# Patient Record
Sex: MALE | Race: BLACK OR AFRICAN AMERICAN | Employment: OTHER | URBAN - METROPOLITAN AREA
[De-identification: names, ages, dates, MRNs, and addresses within clinical notes are randomized per-mention and may not be internally consistent; named-entity substitution may affect disease eponyms.]

---

## 2017-03-31 DIAGNOSIS — E08.319: ICD-10-CM

## 2017-03-31 DIAGNOSIS — E78.5 DYSLIPIDEMIA: ICD-10-CM

## 2017-04-02 RX ORDER — ATORVASTATIN CALCIUM 40 MG/1
TABLET, FILM COATED ORAL
Qty: 90 TAB | Refills: 0 | Status: SHIPPED | OUTPATIENT
Start: 2017-04-02 | End: 2017-11-18 | Stop reason: SDUPTHER

## 2017-09-21 DIAGNOSIS — K21.9 GERD WITHOUT ESOPHAGITIS: ICD-10-CM

## 2017-09-21 RX ORDER — PANTOPRAZOLE SODIUM 40 MG/1
TABLET, DELAYED RELEASE ORAL
Qty: 180 TAB | Refills: 0 | Status: SHIPPED | OUTPATIENT
Start: 2017-09-21 | End: 2018-03-02 | Stop reason: SDUPTHER

## 2017-11-08 ENCOUNTER — TELEPHONE (OUTPATIENT)
Dept: INTERNAL MEDICINE CLINIC | Age: 62
End: 2017-11-08

## 2017-11-08 NOTE — TELEPHONE ENCOUNTER
Call pt  They usually will not cover colos except every 5 years  Have him call transplant center and confirm date with them (feb 2015)  If they insist, sched dr Ana Amaral and have him decide if he can do it

## 2017-11-08 NOTE — TELEPHONE ENCOUNTER
Pt is on the list for a kidney transplant and needs to have a colonoscopy done asap so they don't take him off the list    Please advise pt

## 2017-11-08 NOTE — TELEPHONE ENCOUNTER
I called and spoke with patient, he stated that he actually had one done in 2016 as well. The transplant center is adamant that he have another so he is going to call Dr. Marvin Santana office to schedule an appt and see if Ela Cortney will do another one.

## 2017-11-18 DIAGNOSIS — E78.5 DYSLIPIDEMIA: ICD-10-CM

## 2017-11-18 DIAGNOSIS — E08.319: ICD-10-CM

## 2017-11-19 RX ORDER — ATORVASTATIN CALCIUM 40 MG/1
TABLET, FILM COATED ORAL
Qty: 90 TAB | Refills: 0 | Status: SHIPPED | OUTPATIENT
Start: 2017-11-19 | End: 2018-03-02 | Stop reason: SDUPTHER

## 2018-02-06 ENCOUNTER — TELEPHONE (OUTPATIENT)
Dept: INTERNAL MEDICINE CLINIC | Age: 63
End: 2018-02-06

## 2018-02-06 DIAGNOSIS — Z12.11 SCREENING FOR COLON CANCER: Primary | ICD-10-CM

## 2018-02-06 DIAGNOSIS — D12.6 COLON ADENOMA: ICD-10-CM

## 2018-02-06 NOTE — TELEPHONE ENCOUNTER
Pt on kidney transplant list- they are telling him he needs a colonoscopy- he said you told him it was too soon.  Please advise

## 2018-02-08 NOTE — TELEPHONE ENCOUNTER
Spoke with patient and given message below. He said he is going to call the kidney transplant people back and then he will call Dr. Bruce Osborne if they are still insistent that he have one even though it's too soon.

## 2018-02-14 ENCOUNTER — TELEPHONE (OUTPATIENT)
Dept: INTERNAL MEDICINE CLINIC | Age: 63
End: 2018-02-14

## 2018-03-05 DIAGNOSIS — E78.5 DYSLIPIDEMIA: ICD-10-CM

## 2018-03-05 DIAGNOSIS — E08.319: ICD-10-CM

## 2018-03-05 DIAGNOSIS — K21.9 GERD WITHOUT ESOPHAGITIS: ICD-10-CM

## 2018-03-05 RX ORDER — ATORVASTATIN CALCIUM 40 MG/1
TABLET, FILM COATED ORAL
Qty: 90 TAB | Refills: 0 | OUTPATIENT
Start: 2018-03-05

## 2018-03-05 RX ORDER — PANTOPRAZOLE SODIUM 40 MG/1
TABLET, DELAYED RELEASE ORAL
Qty: 180 TAB | Refills: 0 | OUTPATIENT
Start: 2018-03-05

## 2018-03-05 NOTE — TELEPHONE ENCOUNTER
I refilled meds for 30 days then [facility] sent 90 day request for the protonix and lipitor. I only authorized 30 days as pt is overdue for appt with RD (see previous refill encounter if needed).

## 2018-03-07 ENCOUNTER — TELEPHONE (OUTPATIENT)
Dept: INTERNAL MEDICINE CLINIC | Age: 63
End: 2018-03-07

## 2018-03-07 DIAGNOSIS — E11.40 TYPE 2 DIABETES, CONTROLLED, WITH NEUROPATHY (HCC): Primary | ICD-10-CM

## 2018-03-07 DIAGNOSIS — E78.5 DYSLIPIDEMIA: ICD-10-CM

## 2018-03-15 ENCOUNTER — LAB ONLY (OUTPATIENT)
Dept: INTERNAL MEDICINE CLINIC | Age: 63
End: 2018-03-15

## 2018-03-15 ENCOUNTER — HOSPITAL ENCOUNTER (OUTPATIENT)
Dept: LAB | Age: 63
Discharge: HOME | End: 2018-03-15
Payer: MEDICARE

## 2018-03-15 DIAGNOSIS — E11.40 TYPE 2 DIABETES, CONTROLLED, WITH NEUROPATHY (HCC): ICD-10-CM

## 2018-03-15 DIAGNOSIS — E11.40 TYPE 2 DIABETES, CONTROLLED, WITH NEUROPATHY (HCC): Primary | ICD-10-CM

## 2018-03-15 DIAGNOSIS — E78.5 DYSLIPIDEMIA: ICD-10-CM

## 2018-03-15 LAB
ALBUMIN SERPL-MCNC: 3.3 G/DL (ref 3.4–5)
ALBUMIN/GLOB SERPL: 0.9 {RATIO} (ref 0.8–1.7)
ALP SERPL-CCNC: 77 U/L (ref 45–117)
ALT SERPL-CCNC: 13 U/L (ref 16–61)
ANION GAP SERPL CALC-SCNC: 8 MMOL/L (ref 3–18)
AST SERPL-CCNC: 7 U/L (ref 15–37)
BILIRUB SERPL-MCNC: 0.3 MG/DL (ref 0.2–1)
BUN SERPL-MCNC: 31 MG/DL (ref 7–18)
BUN/CREAT SERPL: 5 (ref 12–20)
CALCIUM SERPL-MCNC: 8.8 MG/DL (ref 8.5–10.1)
CHLORIDE SERPL-SCNC: 97 MMOL/L (ref 100–108)
CHOLEST SERPL-MCNC: 150 MG/DL
CO2 SERPL-SCNC: 32 MMOL/L (ref 21–32)
CREAT SERPL-MCNC: 6.83 MG/DL (ref 0.6–1.3)
GLOBULIN SER CALC-MCNC: 3.7 G/DL (ref 2–4)
GLUCOSE SERPL-MCNC: 103 MG/DL (ref 74–99)
HDLC SERPL-MCNC: 35 MG/DL (ref 40–60)
HDLC SERPL: 4.3 {RATIO} (ref 0–5)
LDLC SERPL CALC-MCNC: 93.4 MG/DL (ref 0–100)
LIPID PROFILE,FLP: ABNORMAL
POTASSIUM SERPL-SCNC: 4.5 MMOL/L (ref 3.5–5.5)
PROT SERPL-MCNC: 7 G/DL (ref 6.4–8.2)
SODIUM SERPL-SCNC: 137 MMOL/L (ref 136–145)
TRIGL SERPL-MCNC: 108 MG/DL (ref ?–150)
VLDLC SERPL CALC-MCNC: 21.6 MG/DL

## 2018-03-15 PROCEDURE — 83036 HEMOGLOBIN GLYCOSYLATED A1C: CPT | Performed by: INTERNAL MEDICINE

## 2018-03-15 PROCEDURE — 36415 COLL VENOUS BLD VENIPUNCTURE: CPT | Performed by: INTERNAL MEDICINE

## 2018-03-15 PROCEDURE — 80061 LIPID PANEL: CPT | Performed by: INTERNAL MEDICINE

## 2018-03-15 PROCEDURE — 80053 COMPREHEN METABOLIC PANEL: CPT | Performed by: INTERNAL MEDICINE

## 2018-03-16 LAB — HBA1C MFR BLD: 4.6 % (ref 4.2–5.6)

## 2018-03-22 NOTE — PROGRESS NOTES
57 y.o. BLACK OR  male who presents for f/u    We have not seen him in over a year but he seems to be doing well. No cardiovascular complaints. He does the indoor bike daily about 30 min    He apparently fell and sustained a left wrist and left upper arm fx in January, treated conservatively by Dr Carolanne Severs at [address] [facility] [facility]. He's recovered most of his function    Denies any GI or  complaints. He has colo scheduled in early April w Dr Opal Conway    He continues on his qMWF hemodialysis with Dr. Debra Rubio. He is now on the transplant list    He was apparently released by Dr Zachary Lea. He sees Dr Beatriz Boothe. Denies polyuria, polydipsia, nocturia, vision change.  Weight is stable    Vitals 3/23/2018 12/20/2016 11/3/2016 10/28/2016 10/28/2016   Weight 167 lb 180 lb 179 lb  178 lb     LAST MEDICARE WELLNESS EXAM: 9/18/14, 9/24/15, 12/20/16, 3/23/18 ACP 12/20/16, 3/23/18    Past Medical History:   Diagnosis Date    Acute hearing loss of right ear     Acute urinary retention 8/15    Anemia     Anemia of chronic disease 2010    saw Dr. Martha Rivera in past    Asbestosis(501)     Chronic edema     Colon adenoma     Dr. Opal Conway 2009, 2/15    Diabetes mellitus ([facility] [address]     w neuropathy and retinopathy Dr. Darren Bridges DJDARCI (degenerative joint disease)     Dyslipidemia     Erectile dysfunction     ESRD on hemodialysis ([facility] [address]     Dr. Debra Rubio, M-W-F    FHx: heart disease     GERD (gastroesophageal reflux disease)     Glaucoma     Dr. Aneta Jara failure [facility]) 2011    HTN (hypertension)     Kidney stone     Sarcoidosis     Sleep apnea     he does not use his cpap machine (states resolved)    Thyroid disease      Past Surgical History:   Procedure Laterality Date    Michela Wise HX COLONOSCOPY      Dr. Opal Conway adenoma 2009, 2/15    HX ORTHOPAEDIC  01/2018    left wrist and humerus fx Dr Talisha Curtis  9/15    cystoscopy negative Dr Sugar Dennis 2009    negative    VASCULAR SURGERY PROCEDURE UNLIST  3/14    nl BALDEMAR bilat     Social History     Social History    Marital status:      Spouse name: N/A    Number of children: 3    Years of education: N/A     Occupational History    [place]      Social History Main Topics    Smoking status: Never Smoker    Smokeless tobacco: Never Used    Alcohol use No    Drug use: No    Sexual activity: No     Other Topics Concern    Not on file     Social History Narrative     No Known Allergies    Current Outpatient Prescriptions   Medication Sig    cloNIDine HCl (CATAPRES) 0.1 mg tablet Take  by mouth two (2) times a day.  docusate sodium (COLACE) 100 mg capsule Take 100 mg by mouth two (2) times a day.  pantoprazole (PROTONIX) 40 mg tablet Take 1 Tab by mouth two (2) times a day.  atorvastatin (LIPITOR) 40 mg tablet Take 1 Tab by mouth daily.  cinnamon bark (CINNAMON) 500 mg cap 500 mg.  timolol (TIMOPTIC) 0.5 % ophthalmic solution     cinacalcet (SENSIPAR) 30 mg tablet Take 30 mg by mouth daily.  aspirin delayed-release 81 mg tablet Take  by mouth daily.  isosorbide dinitrate (ISORDIL) 20 mg tablet Take 20 mg by mouth two (2) times a day.  NIFEdipine XL (PROCARDIA-XL) 90 mg tablet Take 60 mg by mouth daily.  sevelamer carbonate (RENVELA) 800 mg Tab tab Take  by mouth three (3) times daily.  carvedilol (COREG) 25 mg tablet Take 25 mg by mouth two (2) times a day.  MULTIVITAMIN PO Take  by mouth.  esomeprazole (NEXIUM) 40 mg capsule TAKE 1 CAPSULE BY MOUTH TWICE DAILY     No current facility-administered medications for this visit.       REVIEW OF SYSTEMS: colo Dr Earnest Alonso 2/15, sees Dr Velma Duggan, Dr Rema Alva in past  Ophtho  no vision change or eye pain  Oral  no mouth pain, tongue or tooth problems  Ears  no hearing loss, ear pain, fullness, no swallowing problems  Cardiac  no CP, PND, orthopnea, edema, palpitations or syncope  Chest  no breast masses  Resp  no wheezing, chronic coughing, dyspnea  GI  no heartburn, nausea, vomiting, change in bowel habits, bleeding, hemorrhoids  Urinary  no dysuria, hematuria, flank pain, urgency, frequency  Genitals  no genital lesions, discharge, masses, ulceration, warts  Ortho  no swelling, dec ROM, myalgias  Derm  no nail abnormalities, rashes, lesions of note, hair loss  Psych  denies any anxiety or depression symptoms, no hallucinations or violent ideation  Constitutional  no wt loss, night sweats, unexplained fevers  Neuro  no focal weakness, numbness, paresthesias, incoordination, ataxia, involuntary movements  Endo - no polyuria, polydipsia, nocturia, hot flashes    Visit Vitals    /72 (BP 1 Location: Right arm, BP Patient Position: Sitting)    Pulse 70    Temp 98 °F (36.7 °C) (Oral)    Resp 14    Ht 5' 11\" (1.803 m)    Wt 167 lb (75.8 kg)    SpO2 99%    BMI 23.29 kg/m2   A&O x3  Affect is appropriate. Mood stable  No apparent distress  Anicteric, no JVD, adenopathy or thyromegaly. No carotid bruits or radiated murmur  Lungs clear to auscultation, no wheezes or rales   Heart --Regular rate and rhythm, 2/6 ERNESTINA lsb without radiation, no rubs, gallops, or clicks. Abdomen -- Soft and nontender, no hepatosplenomegaly or masses. Extremities -- Without cyanosis, clubbing. 2+ pulses equally and bilaterally.   No edema noted, LUE AV fistula w thrill and bruit  Foot exam bilaterally showed  Reflexes 2+   Vibration, proprioception, filament test dec in toes  Pulses 1-2+ DP and PT    LABS  From 2/11 showed   gluc 131, cr 4.5,             hba1c 6.1,                   chol 187, tg 154, hdl 25, ldl-c 131, wbc 9.8,  hb 8.7,   plt 374, psa 1.00  From 11/11 showed gluc 114,              hba1c 5.6,             chol 157, tg 157, hdl 36, ldl-c 90  From 5/12 showed     alt 13, hba1c 6.1, ldl-p 1661, chol 184, tg 192, hdl 27, ldl-c 119  From 11/12 showed gluc 130, cr 6.0, gfr 11, alt 9,   hba1c 5.4, ldl-p 884, wbc 8.0,  hb 10.3  plt 324  From 8/13 showed   gluc 144, cr 8.3,  alt<5,  hba1c 5.7,             chol 169, tg 327, hdl 20, ldl-c 84   From 3/14 showed   gluc 125, cr 6.9,  alt<5,  hba1c 5.8,             chol 141, tg 233, hdl 24, ldl-c 70,  wbc 5.7,   hb 10.4, plt 239  From 9/14 showed                hba1c 5.7,                        psa 1.60, vit d 32.8  From 8/15 showed   gluc 151,   alt 14,               wbc 11.9, hb 11.5, plt 189, lip 283  From 9/15 showed   gluc 110, cr 6.51, gfr 9, alt<5,  hba1c 5.6,             chol 182, tg 219, hdl 26, ldl-c 112, wbc 4.5,   hb 11.7, plt 245  From 2/16 showed                hba1c 5.6,             chol 183, tg 169, hdl 34, ldl-c 115  Form 6/16 showed   gluc 124, cr 8.22,             hba1c 6.0,             chol 140, tg 184, hdl 30, ldl-c 73  From 12/16 showed               alt 15, hba1c 4.8,             chol 138, tg 138, hdl 33, ldl-c 77    Results for orders placed or performed during the hospital encounter of 03/15/18   LIPID PANEL   Result Value Ref Range    LIPID PROFILE          Cholesterol, total 150 <200 MG/DL    Triglyceride 108 <150 MG/DL    HDL Cholesterol 35 (L) 40 - 60 MG/DL    LDL, calculated 93.4 0 - 100 MG/DL    VLDL, calculated 21.6 MG/DL    CHOL/HDL Ratio 4.3 0 - 5.0     HEMOGLOBIN A1C W/O EAG   Result Value Ref Range    Hemoglobin A1c 4.6 4.2 - 5.6 %   METABOLIC PANEL, COMPREHENSIVE   Result Value Ref Range    Sodium 137 136 - 145 mmol/L    Potassium 4.5 3.5 - 5.5 mmol/L    Chloride 97 (L) 100 - 108 mmol/L    CO2 32 21 - 32 mmol/L    Anion gap 8 3.0 - 18 mmol/L    Glucose 103 (H) 74 - 99 mg/dL    BUN 31 (H) 7.0 - 18 MG/DL    Creatinine 6.83 (H) 0.6 - 1.3 MG/DL    BUN/Creatinine ratio 5 (L) 12 - 20      GFR est AA 10 (L) >60 ml/min/1.73m2    GFR est non-AA 8 (L) >60 ml/min/1.73m2    Calcium 8.8 8.5 - 10.1 MG/DL    Bilirubin, total 0.3 0.2 - 1.0 MG/DL    ALT (SGPT) 13 (L) 16 - 61 U/L    AST (SGOT) 7 (L) 15 - 37 U/L    Alk.  phosphatase 77 45 - 117 U/L    Protein, total 7.0 6.4 - 8.2 g/dL    Albumin 3.3 (L) 3.4 - 5.0 g/dL    Globulin 3.7 2.0 - 4.0 g/dL    A-G Ratio 0.9 0.8 - 1.7       Patient Active Problem List   Diagnosis Code    Sleep apnea Dr. Karin Monique G47.30    Colon adenoma 2/15 Dr. Delia Viera D12.6    Erectile dysfunction N52.9    Sarcoidosis D86.9    Dyslipidemia E78.5    Primary hypertension I10    GERD without esophagitis K21.9    Arthritis, degenerative M19.90    End stage renal disease on dialysis ([facility] [address] N18.6, Z99.2    Advance directive discussed with patient Z71.89    Diabetic retinopathy ([facility] [address] E11.319    Type 2 diabetes, controlled, with neuropathy ([facility] Dr Coates E11.40     ASSESSMENT AND PLAN:  1. DM.  Well-controlled, f/u  Dr Coates, f/u Dr. Malcom Cdaena and Dr Leif Villa  2. Hypertension. Continue current   3. Hyperlipidemia. Doing well  4. ESRD. F/U Dr. Stephie Keene  5. Colon polyps. Fiber, colo 4/18 as scheduled  6. Asbestosis. Routine f/u from work  7. Ortho. F/U Dr Vamshi Lin  8. Sleep apnea. Off cpap. F/U Dr. Karin Monique as needed  9. Ophtho. F/U Dr. Carey Barragan  10. GERD. ppi and avoidance measures              RTC 9/18    Above conditions discussed at length and patient vocalized understanding.   All questions answered to patient satifaction

## 2018-03-22 NOTE — ACP (ADVANCE CARE PLANNING)
Advance Care Planning    Advance Care Planning (ACP) Provider Note - Comprehensive     Date of ACP Conversation: 03/23/18  Persons included in Conversation:  patient  Length of ACP Conversation in minutes:  16 minutes    Authorized Decision Maker (if patient is incapable of making informed decisions): This person is: wife  Other Legally Authorized Decision Maker (e.g. Next of Kin)          General ACP for ALL Patients with Decision Making Capacity:   Importance of advance care planning, including choosing a healthcare agent to communicate patient's healthcare decisions if patient lost the ability to make decisions, such as after a sudden illness or accident  Understanding of the healthcare agent role was assessed and information provided  Exploration of values, goals, and preferences if recovery is not expected, even with continued medical treatment in the event of: Imminent death  Severe, permanent brain injury    Review of Existing Advance Directive:  na    For Serious or Chronic Illness:  Understanding of medical condition    Understanding of CPR, goals and expected outcomes, benefits and burdens discussed.     Interventions Provided:  Recommended completion of Advance Directive form after review of ACP materials and conversation with prospective healthcare agent   Recommended communicating the plan and making copies for the healthcare agent, personal physician, and others as appropriate (e.g., health system)  Recommended review of completed ACP document annually or upon change in health status

## 2018-03-22 NOTE — PATIENT INSTRUCTIONS

## 2018-03-22 NOTE — PROGRESS NOTES
This is a Subsequent Medicare Annual Wellness Visit providing Personalized Prevention Plan Services (PPPS)     I have reviewed the patient's medical history in detail and updated the computerized patient record. History     Past Medical History:   Diagnosis Date    Acute urinary retention 8/15    Anemia     Anemia of chronic disease 2010    saw Dr. Biju Edwards in past    Asbestosis([facility])     Chronic edema     Colon adenoma     Dr. Lidia Velazquez 2009, 2/15    Diabetes mellitus ([facility] [address]     w neuropathy and retinopathy Dr. Rosemary Thomas DJD (degenerative joint disease)     Dyslipidemia     Erectile dysfunction     ESRD on hemodialysis ([facility] [address]     Dr. Anna Waddell, M-W-F    FHx: heart disease     GERD (gastroesophageal reflux disease)     Glaucoma     Dr. Nikita Graves failure [facility]) 2011    HTN (hypertension)     Kidney stone     Sarcoidosis     Sleep apnea     he does not use his cpap machine (states resolved)    Thyroid disease       Past Surgical History:   Procedure Laterality Date    Timothy Velásquez HX COLONOSCOPY      Dr. Lidia Velazquez adenoma 2009, 2/15    HX UROLOGICAL  9/15    cystoscopy negative Dr Kahlil Bell  2009    negative    VASCULAR SURGERY PROCEDURE UNLIST  3/14    nl BALDEMAR bilat     Current Outpatient Prescriptions   Medication Sig Dispense Refill    pantoprazole (PROTONIX) 40 mg tablet Take 1 Tab by mouth two (2) times a day. 60 Tab 0    atorvastatin (LIPITOR) 40 mg tablet Take 1 Tab by mouth daily. 30 Tab 0    esomeprazole (NEXIUM) 40 mg capsule TAKE 1 CAPSULE BY MOUTH TWICE DAILY 180 Cap 3    cinnamon bark (CINNAMON) 500 mg cap 500 mg.  timolol (TIMOPTIC) 0.5 % ophthalmic solution   3    cinacalcet (SENSIPAR) 30 mg tablet Take 30 mg by mouth daily.  aspirin delayed-release 81 mg tablet Take  by mouth daily.  isosorbide dinitrate (ISORDIL) 20 mg tablet Take 20 mg by mouth two (2) times a day.       NIFEdipine XL (PROCARDIA-XL) 90 mg tablet Take 60 mg by mouth daily.  sevelamer carbonate (RENVELA) 800 mg Tab tab Take  by mouth three (3) times daily.  carvedilol (COREG) 25 mg tablet Take 25 mg by mouth two (2) times a day.  MULTIVITAMIN PO Take  by mouth. No Known Allergies  Family History   Problem Relation Age of Onset    Diabetes Father     Heart Disease Sister     Diabetes Brother     Heart Disease Brother     Diabetes Brother     Diabetes Sister      Social History   Substance Use Topics    Smoking status: Never Smoker    Smokeless tobacco: Never Used    Alcohol use No     Depression Risk Factor Screening:     PHQ over the last two weeks 11/3/2016   Little interest or pleasure in doing things Not at all   Feeling down, depressed or hopeless Not at all   Total Score PHQ 2 0     SCREENINGS  Colonoscopy last done 2/15 Dr Ruben Tee  Prostate OLI done  PSA done 8/15 Dr Karson Melgoza    Immunization History   Administered Date(s) Administered    Influenza Vaccine 12/15/2013, 10/01/2014    Influenza Vaccine Whole 02/02/2011    Pneumococcal Conjugate (PCV-13) 09/24/2015    Pneumococcal Polysaccharide (PPSV-23) 11/03/2016, 12/20/2016    TD Vaccine 01/01/2007    ZZZ-RETIRED (DO NOT USE) Pneumococcal Vaccine (Unspecified Type) 02/02/2011    Zoster Vaccine, Live 03/18/2014     Alcohol Risk Factor Screening: On any occasion during the past 3 months, have you had more than 4 drinks containing alcohol? No    Do you average more than 14 drinks per week? No      Functional Ability and Level of Safety:     Hearing Loss   mild-to-moderate, he has seen Dr Yvrose Garcia in past    Vision - no acute complaints and is followed by Dr Mervin Terrell of [facility]   Self-care. Requires assistance with: no ADLs    Fall Risk     Fall Risk Assessment, last 12 mths 8/13/2015   Able to walk? Yes   Fall in past 12 months?  No     Abuse Screen   Patient is not abused    Review of Systems   A comprehensive review of systems was negative except for that written in the HPI. Physical Examination     Evaluation of Cognitive Function:  Mood/affect:  neutral  Appearance: age appropriate, well dressed and within normal Limits  Family member/caregiver input: na    Patient Care Team:  Julius Ramos MD as PCP - General (Internal Medicine)  Adelaida Almanza, RN as Ambulatory Care Navigator (Internal Medicine)  Chantel Yan RN as Ambulatory Care Navigator (Internal Medicine)  Letcher Severe, MD as Physician (Urology)  Ced Dk, [address] as Physician Assistant (Physician Assistant)  Rosemary Smoker, [address] as Physician Assistant (Vascular Surgery)  Santos Scott DPM (Podiatry)  Karey Mccormick MD (Ophthalmology)  Gena Martinez MD (Ophthalmology)    Advice/Referrals/Counseling   Education and counseling provided:  Are appropriate based on today's review and evaluation  End-of-Life planning (with patient's consent)  Pneumococcal Vaccine  Influenza Vaccine  Prostate cancer screening tests (PSA, covered annually)  Colorectal cancer screening tests  Cardiovascular screening blood test    Assessment/Plan       ICD-10-CM ICD-9-CM    1. Medicare annual wellness visit, subsequent Z00.00 V70.0    2. Advanced directives, counseling/discussion Z71.89 V65.49 ADVANCE CARE PLANNING FIRST 30 MINS   3. Screening for alcoholism Z13.89 V79.1 [place] ANNUAL ALCOHOL SCREEN 15 MIN   4. Screening for depression Z13.89 V79.0 DEPRESSION SCREEN ANNUAL   5. Screen for colon cancer Z12.11 V76.51    6. Screening for ischemic heart disease Z13.6 V81.0    7. Special screening for malignant neoplasm of prostate Z12.5 V76.44 [place] PROSTATE CA SCREENING; OLI     current treatment plan is effective, no change in therapy  lab results and schedule of future lab studies reviewed with patient  reviewed diet, exercise and weight control  cardiovascular risk and specific lipid/LDL goals reviewed. End of life discussion undertaken.   Has medical directive in place  Flu high dose given  Colonoscopy to be scheduled 2020

## 2018-03-23 ENCOUNTER — OFFICE VISIT (OUTPATIENT)
Dept: INTERNAL MEDICINE CLINIC | Age: 63
End: 2018-03-23

## 2018-03-23 VITALS
TEMPERATURE: 98 F | SYSTOLIC BLOOD PRESSURE: 132 MMHG | WEIGHT: 167 LBS | DIASTOLIC BLOOD PRESSURE: 72 MMHG | HEIGHT: 71 IN | OXYGEN SATURATION: 99 % | HEART RATE: 70 BPM | BODY MASS INDEX: 23.38 KG/M2 | RESPIRATION RATE: 14 BRPM

## 2018-03-23 DIAGNOSIS — Z12.5 SPECIAL SCREENING FOR MALIGNANT NEOPLASM OF PROSTATE: ICD-10-CM

## 2018-03-23 DIAGNOSIS — E11.319 DIABETIC RETINOPATHY ASSOCIATED WITH TYPE 2 DIABETES MELLITUS, MACULAR EDEMA PRESENCE UNSPECIFIED, UNSPECIFIED LATERALITY, UNSPECIFIED RETINOPATHY SEVERITY (HCC): ICD-10-CM

## 2018-03-23 DIAGNOSIS — Z00.00 MEDICARE ANNUAL WELLNESS VISIT, SUBSEQUENT: Primary | ICD-10-CM

## 2018-03-23 DIAGNOSIS — I10 PRIMARY HYPERTENSION: ICD-10-CM

## 2018-03-23 DIAGNOSIS — Z71.89 ADVANCED DIRECTIVES, COUNSELING/DISCUSSION: ICD-10-CM

## 2018-03-23 DIAGNOSIS — E78.5 DYSLIPIDEMIA: ICD-10-CM

## 2018-03-23 DIAGNOSIS — M19.90 OSTEOARTHRITIS, UNSPECIFIED OSTEOARTHRITIS TYPE, UNSPECIFIED SITE: ICD-10-CM

## 2018-03-23 DIAGNOSIS — G47.33 OBSTRUCTIVE SLEEP APNEA SYNDROME: ICD-10-CM

## 2018-03-23 DIAGNOSIS — N18.6 END STAGE RENAL DISEASE ON DIALYSIS (HCC): ICD-10-CM

## 2018-03-23 DIAGNOSIS — Z12.11 SCREEN FOR COLON CANCER: ICD-10-CM

## 2018-03-23 DIAGNOSIS — K21.9 GERD WITHOUT ESOPHAGITIS: ICD-10-CM

## 2018-03-23 DIAGNOSIS — D86.9 SARCOIDOSIS: ICD-10-CM

## 2018-03-23 DIAGNOSIS — Z99.2 END STAGE RENAL DISEASE ON DIALYSIS (HCC): ICD-10-CM

## 2018-03-23 DIAGNOSIS — E11.40 TYPE 2 DIABETES, CONTROLLED, WITH NEUROPATHY (HCC): ICD-10-CM

## 2018-03-23 DIAGNOSIS — Z13.31 SCREENING FOR DEPRESSION: ICD-10-CM

## 2018-03-23 DIAGNOSIS — Z13.39 SCREENING FOR ALCOHOLISM: ICD-10-CM

## 2018-03-23 DIAGNOSIS — Z13.6 SCREENING FOR ISCHEMIC HEART DISEASE: ICD-10-CM

## 2018-03-23 DIAGNOSIS — D12.6 COLON ADENOMA: ICD-10-CM

## 2018-03-23 RX ORDER — DOCUSATE SODIUM 100 MG/1
100 CAPSULE, LIQUID FILLED ORAL 2 TIMES DAILY
COMMUNITY
End: 2020-02-23

## 2018-03-23 RX ORDER — CLONIDINE HYDROCHLORIDE 0.1 MG/1
TABLET ORAL 2 TIMES DAILY
COMMUNITY
End: 2020-02-23
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
## 2018-03-23 NOTE — MR AVS SNAPSHOT
Romana Halo 
 
 
 [address], [address] [place] [address] [facility] 
[phone] Patient: Mercedes Rocha MRN: [id number] AU:[id number] Visit Information Date & Time Provider Department Dept. Phone Encounter #  
 3/23/2018  1:45 PM Holli Collins MD [facility] [phone] Your Appointments 9/20/2018  8:15 AM  
LAB with IOC NURSE VISIT [facility] ([address]) Appt Note: labs [address], [address] [place] [address] [facility] [address] [place]  
  
   
 [address], [address]  
  
    
 9/28/2018  1:45 PM  
Office Visit with Holli Collins MD  
[facility] [address] [facility]) Appt Note: 6 mo f/u  
 [address] [id number] [place] [address] [address] [place]  
  
   
 [address], [address] Upcoming Health Maintenance Date Due  
 FOOT EXAM Q1 3/8/2017 MICROALBUMIN Q1 6/14/2017 Influenza Age 5 to Adult 8/1/2017 EYE EXAM RETINAL OR DILATED Q1 3/21/2019* HEMOGLOBIN A1C Q6M 9/15/2018 LIPID PANEL Q1 3/15/2019 MEDICARE YEARLY EXAM 3/24/2019 COLONOSCOPY 2/19/2020 DTaP/Tdap/Td series (2 - Td) 9/24/2025 *Topic was postponed. The date shown is not the original due date. Allergies as of 3/23/2018  Review Complete On: 3/23/2018 By: Lala Diamond No Known Allergies Current Immunizations  Reviewed on 12/18/2016 Name Date Influenza Vaccine 10/1/2014, 12/15/2013 Influenza Vaccine Whole 2/2/2011 Pneumococcal Conjugate (PCV-13) 9/24/2015 Pneumococcal Polysaccharide (PPSV-23) 12/20/2016, 11/3/2016 TD Vaccine 1/1/2007 ZZZ-RETIRED (DO NOT USE) Pneumococcal Vaccine (Unspecified Type) 2/2/2011 Zoster Vaccine, Live 3/18/2014  8:50 AM  
  
 Not reviewed this visit You Were Diagnosed With   
  
 Codes Comments Medicare annual wellness visit, subsequent    -  Primary ICD-10-CM: Z00.00 ICD-9-CM: V70.0 Advanced directives, counseling/discussion     ICD-10-CM: Z71.89 ICD-9-CM: V65.49 Screening for alcoholism     ICD-10-CM: Z13.89 ICD-9-CM: V79.1 Screening for depression     ICD-10-CM: Z13.89 ICD-9-CM: V79.0 Screen for colon cancer     ICD-10-CM: Z12.11 ICD-9-CM: V76.51 Screening for ischemic heart disease     ICD-10-CM: Z13.6 ICD-9-CM: V81.0 Special screening for malignant neoplasm of prostate     ICD-10-CM: Z12.5 ICD-9-CM: V76.44 End stage renal disease on dialysis [facility])     ICD-10-CM: N18.6, Z99.2 ICD-9-CM: 585.6, V45.11 Diabetic retinopathy associated with type 2 diabetes mellitus, macular edema presence unspecified, unspecified laterality, unspecified retinopathy severity ([facility] [address]     ICD-10-CM: G24.563 ICD-9-CM: 250.50, 362.01 Type 2 diabetes, controlled, with neuropathy ([facility] [address]     ICD-10-CM: E11.40 ICD-9-CM: 250.60, 357.2 Obstructive sleep apnea syndrome     ICD-10-CM: G47.33 
ICD-9-CM: 327.23 Colon adenoma     ICD-10-CM: D12.6 ICD-9-CM: 211.3 Sarcoidosis     ICD-10-CM: D86.9 ICD-9-CM: 135 Dyslipidemia     ICD-10-CM: E78.5 ICD-9-CM: 272.4 Primary hypertension     ICD-10-CM: I10 
ICD-9-CM: 401.9 GERD without esophagitis     ICD-10-CM: K21.9 ICD-9-CM: 530.81 Osteoarthritis, unspecified osteoarthritis type, unspecified site     ICD-10-CM: M19.90 ICD-9-CM: 715.90 Vitals BP Pulse Temp Resp Height(growth percentile) Weight(growth percentile) 132/72 (BP 1 Location: Right arm, BP Patient Position: Sitting) 70 98 °F (36.7 °C) (Oral) 14 5' 11\" (1.803 m) 167 lb (75.8 kg) SpO2 BMI Smoking Status 99% 23.29 kg/m2 Never Smoker Vitals History BMI and BSA Data Body Mass Index Body Surface Area  
 23.29 kg/m 2 1.95 m 2 Preferred Pharmacy Pharmacy Name Phone [phone] [facility] [place], [address] [phone] Your Updated Medication List  
  
   
This list is accurate as of 3/23/18  2:43 PM.  Always use your most recent med list.  
  
  
  
  
 aspirin delayed-release 81 mg tablet Take  by mouth daily. atorvastatin 40 mg tablet Commonly known as:  LIPITOR Take 1 Tab by mouth daily. carvedilol 25 mg tablet Commonly known as:  Lurlene Solo Take 25 mg by mouth two (2) times a day. CINNAMON 500 mg Cap Generic drug:  cinnamon bark 500 mg.  
  
 cloNIDine HCl 0.1 mg tablet Commonly known as:  CATAPRES Take  by mouth two (2) times a day. docusate sodium 100 mg capsule Commonly known as:  Olene Cam Take 100 mg by mouth two (2) times a day. esomeprazole 40 mg capsule Commonly known as:  NEXIUM  
TAKE 1 CAPSULE BY MOUTH TWICE DAILY  
  
 isosorbide dinitrate 20 mg tablet Commonly known as:  ISORDIL Take 20 mg by mouth two (2) times a day. MULTIVITAMIN PO Take  by mouth. NIFEdipine ER 90 mg ER tablet Commonly known as:  PROCARDIA XL Take 60 mg by mouth daily. pantoprazole 40 mg tablet Commonly known as:  PROTONIX Take 1 Tab by mouth two (2) times a day. RENVELA 800 mg Tab tab Generic drug:  sevelamer carbonate Take  by mouth three (3) times daily. SENSIPAR 30 mg tablet Generic drug:  cinacalcet Take 30 mg by mouth daily. timolol 0.5 % ophthalmic solution Commonly known as:  TIMOPTIC We Performed the Following ADVANCE CARE PLANNING FIRST 30 MINS [01729 CPT(R)] Yoel 68 [[id number] [facility]] MO ANNUAL ALCOHOL SCREEN 15 MIN [id number] [facility]] MO PROSTATE CA SCREENING; OLI [ [facility]] Patient Instructions Medicare Wellness Visit, Male The best way to live healthy is to have a healthy lifestyle by eating a well-balanced diet, exercising regularly, limiting alcohol and stopping smoking. Regular physical exams and screening tests are another way to keep healthy. Preventive exams provided by your health care provider can find health problems before they become diseases or illnesses. Preventive services including immunizations, screening tests, monitoring and exams can help you take care of your own health. All people over age 72 should have a pneumovax  and and a prevnar shot to prevent pneumonia. These are once in a lifetime unless you and your provider decide differently. All people over 65 should have a yearly flu shot and a tetanus vaccine every 10 years. Screening for diabetes mellitus with a blood sugar test should be done every year. Glaucoma is a disease of the eye due to increased ocular pressure that can lead to blindness and it should be done every year by an eye professional. 
 
Cardiovascular screening tests that check for elevated lipids (fatty part of blood) which can lead to heart disease and strokes should be done every 5 years. Colorectal screening that evaluates for blood or polyps in your colon should be done yearly as a stool test or every five years as a flexible sigmoidoscope or every 10 years as a colonoscopy up to age 76. Men up to age 76 may need a screening blood test for prostate cancer at certain intervals, depending on their personal and family history. This decision is between the patient and his provider. If you have been a smoker or had family history of abdominal aortic aneurysms, you and your provider may decide to schedule an ultrasound test of your aorta. Hepatitis C screening is also recommended for anyone born between [birth year] through [address]. A shingles vaccine is also recommended once in a lifetime after age 61. Your Medicare Wellness Exam is recommended annually. Here is a list of your current Health Maintenance items with a due date: Health Maintenance Due Topic Date Due  
 Diabetic Foot Care  03/08/2017 [facility] Eye Exam  06/02/2017  Albumin Urine Test  06/14/2017  Flu Vaccine  08/01/2017 [facility] Annual Well Visit  03/14/2018 Introducing [facility]! [facility] introduces [employer] patient portal. Now you can access parts of your medical record, email your doctor's office, and request medication refills online. 1. In your internet browser, go to [email or web address] 2. Click on the First Time User? Click Here link in the Sign In box. You will see the New Member Sign Up page. 3. Enter your [employer] Access Code exactly as it appears below. You will not need to use this code after youve completed the sign-up process. If you do not sign up before the expiration date, you must request a new code. · [employer] Access Code: [email or web address] Expires: 6/21/2018  2:43 PM 
 
4. Enter the last four digits of your Social Security Number (xxxx) and Date of Birth (mm/dd/yyyy) as indicated and click Submit. You will be taken to the next sign-up page. 5. Create a [employer] ID. This will be your [employer] login ID and cannot be changed, so think of one that is secure and easy to remember. 6. Create a [employer] password. You can change your password at any time. 7. Enter your Password Reset Question and Answer. This can be used at a later time if you forget your password. 8. Enter your e-mail address. You will receive e-mail notification when new information is available in [address] 9. Click Sign Up. You can now view and download portions of your medical record. 10. Click the Download Summary menu link to download a portable copy of your medical information. If you have questions, please visit the Frequently Asked Questions section of the [employer] website. Remember, [employer] is NOT to be used for urgent needs. For medical emergencies, dial 911. Now available from your iPhone and Android! Please provide this summary of care documentation to your next provider. Your primary care clinician is listed as Nba Khan. If you have any questions after today's visit, please call [phone].

## 2018-04-09 ENCOUNTER — ANESTHESIA EVENT (OUTPATIENT)
Dept: ENDOSCOPY | Age: 63
End: 2018-04-09
Payer: MEDICARE

## 2018-04-10 ENCOUNTER — ANESTHESIA (OUTPATIENT)
Dept: ENDOSCOPY | Age: 63
End: 2018-04-10
Payer: MEDICARE

## 2018-04-10 ENCOUNTER — HOSPITAL ENCOUNTER (OUTPATIENT)
Age: 63
Setting detail: OUTPATIENT SURGERY
Discharge: HOME | End: 2018-04-10
Attending: INTERNAL MEDICINE | Admitting: INTERNAL MEDICINE
Payer: MEDICARE

## 2018-04-10 VITALS
BODY MASS INDEX: 22.75 KG/M2 | DIASTOLIC BLOOD PRESSURE: 77 MMHG | WEIGHT: 168 LBS | OXYGEN SATURATION: 100 % | RESPIRATION RATE: 20 BRPM | HEIGHT: 72 IN | HEART RATE: 80 BPM | SYSTOLIC BLOOD PRESSURE: 170 MMHG | TEMPERATURE: 96.8 F

## 2018-04-10 LAB
BUN BLD-MCNC: 26 MG/DL (ref 7–18)
CHLORIDE BLD-SCNC: 96 MMOL/L (ref 100–108)
ERYTHROCYTE [DISTWIDTH] IN BLOOD BY AUTOMATED COUNT: 17.3 % (ref 11.6–14.5)
GLUCOSE BLD STRIP.AUTO-MCNC: 101 MG/DL (ref 70–110)
GLUCOSE BLD STRIP.AUTO-MCNC: 130 MG/DL (ref 70–110)
GLUCOSE BLD STRIP.AUTO-MCNC: 96 MG/DL (ref 74–106)
HCT VFR BLD AUTO: 35.8 % (ref 36–48)
HCT VFR BLD CALC: 36 % (ref 36–49)
HGB BLD-MCNC: 12 G/DL (ref 13–16)
HGB BLD-MCNC: 12.2 G/DL (ref 12–16)
INR BLD: 1.2 (ref 0.9–1.2)
MCH RBC QN AUTO: 29.9 PG (ref 24–34)
MCHC RBC AUTO-ENTMCNC: 33.5 G/DL (ref 31–37)
MCV RBC AUTO: 89.3 FL (ref 74–97)
PLATELET # BLD AUTO: 182 K/UL (ref 135–420)
PMV BLD AUTO: 9.8 FL (ref 9.2–11.8)
POTASSIUM BLD-SCNC: 4.3 MMOL/L (ref 3.5–5.5)
RBC # BLD AUTO: 4.01 M/UL (ref 4.7–5.5)
SODIUM BLD-SCNC: 138 MMOL/L (ref 136–145)
WBC # BLD AUTO: 5.3 K/UL (ref 4.6–13.2)

## 2018-04-10 PROCEDURE — 77030008565 HC TBNG SUC IRR ERBE -B: Performed by: INTERNAL MEDICINE

## 2018-04-10 PROCEDURE — 74011000258 HC RX REV CODE- 258

## 2018-04-10 PROCEDURE — 76040000007: Performed by: INTERNAL MEDICINE

## 2018-04-10 PROCEDURE — 77030038604 HC SNR ENDO EXACTO USEN -B: Performed by: INTERNAL MEDICINE

## 2018-04-10 PROCEDURE — 82435 ASSAY OF BLOOD CHLORIDE: CPT

## 2018-04-10 PROCEDURE — 74011000250 HC RX REV CODE- 250: Performed by: NURSE ANESTHETIST, CERTIFIED REGISTERED

## 2018-04-10 PROCEDURE — 74011250636 HC RX REV CODE- 250/636

## 2018-04-10 PROCEDURE — 85610 PROTHROMBIN TIME: CPT

## 2018-04-10 PROCEDURE — 88305 TISSUE EXAM BY PATHOLOGIST: CPT | Performed by: INTERNAL MEDICINE

## 2018-04-10 PROCEDURE — 74011000250 HC RX REV CODE- 250

## 2018-04-10 PROCEDURE — 77030018846 HC SOL IRR STRL H20 ICUM -A: Performed by: INTERNAL MEDICINE

## 2018-04-10 PROCEDURE — 76060000032 HC ANESTHESIA 0.5 TO 1 HR: Performed by: INTERNAL MEDICINE

## 2018-04-10 PROCEDURE — 85027 COMPLETE CBC AUTOMATED: CPT | Performed by: NURSE ANESTHETIST, CERTIFIED REGISTERED

## 2018-04-10 PROCEDURE — 74011000258 HC RX REV CODE- 258: Performed by: NURSE ANESTHETIST, CERTIFIED REGISTERED

## 2018-04-10 PROCEDURE — 82962 GLUCOSE BLOOD TEST: CPT

## 2018-04-10 RX ORDER — LIDOCAINE HYDROCHLORIDE 20 MG/ML
INJECTION, SOLUTION EPIDURAL; INFILTRATION; INTRACAUDAL; PERINEURAL AS NEEDED
Status: DISCONTINUED | OUTPATIENT
Start: 2018-04-10 | End: 2018-04-10 | Stop reason: HOSPADM

## 2018-04-10 RX ORDER — DEXTROSE MONOHYDRATE AND SODIUM CHLORIDE 5; .225 G/100ML; G/100ML
25 INJECTION, SOLUTION INTRAVENOUS CONTINUOUS
Status: DISCONTINUED | OUTPATIENT
Start: 2018-04-10 | End: 2018-04-10 | Stop reason: HOSPADM

## 2018-04-10 RX ORDER — DEXTROSE MONOHYDRATE AND SODIUM CHLORIDE 5; .225 G/100ML; G/100ML
INJECTION, SOLUTION INTRAVENOUS
Status: DISCONTINUED | OUTPATIENT
Start: 2018-04-10 | End: 2018-04-10 | Stop reason: HOSPADM

## 2018-04-10 RX ORDER — PROPOFOL 10 MG/ML
INJECTION, EMULSION INTRAVENOUS AS NEEDED
Status: DISCONTINUED | OUTPATIENT
Start: 2018-04-10 | End: 2018-04-10 | Stop reason: HOSPADM

## 2018-04-10 RX ORDER — MAGNESIUM SULFATE 100 %
4 CRYSTALS MISCELLANEOUS AS NEEDED
Status: DISCONTINUED | OUTPATIENT
Start: 2018-04-10 | End: 2018-04-10 | Stop reason: HOSPADM

## 2018-04-10 RX ORDER — SODIUM CHLORIDE, SODIUM LACTATE, POTASSIUM CHLORIDE, CALCIUM CHLORIDE 600; 310; 30; 20 MG/100ML; MG/100ML; MG/100ML; MG/100ML
INJECTION, SOLUTION INTRAVENOUS
Status: DISCONTINUED | OUTPATIENT
Start: 2018-04-10 | End: 2018-04-10

## 2018-04-10 RX ORDER — HYDRALAZINE HYDROCHLORIDE 20 MG/ML
INJECTION INTRAMUSCULAR; INTRAVENOUS AS NEEDED
Status: DISCONTINUED | OUTPATIENT
Start: 2018-04-10 | End: 2018-04-10 | Stop reason: HOSPADM

## 2018-04-10 RX ORDER — SODIUM CHLORIDE 0.9 % (FLUSH) 0.9 %
5-10 SYRINGE (ML) INJECTION AS NEEDED
Status: DISCONTINUED | OUTPATIENT
Start: 2018-04-10 | End: 2018-04-10 | Stop reason: HOSPADM

## 2018-04-10 RX ORDER — DEXTROSE 50 % IN WATER (D50W) INTRAVENOUS SYRINGE
25-50 AS NEEDED
Status: DISCONTINUED | OUTPATIENT
Start: 2018-04-10 | End: 2018-04-10 | Stop reason: HOSPADM

## 2018-04-10 RX ORDER — LIDOCAINE HYDROCHLORIDE 10 MG/ML
0.1 INJECTION, SOLUTION EPIDURAL; INFILTRATION; INTRACAUDAL; PERINEURAL AS NEEDED
Status: DISCONTINUED | OUTPATIENT
Start: 2018-04-10 | End: 2018-04-10 | Stop reason: HOSPADM

## 2018-04-10 RX ORDER — MIDAZOLAM HYDROCHLORIDE 1 MG/ML
INJECTION, SOLUTION INTRAMUSCULAR; INTRAVENOUS AS NEEDED
Status: DISCONTINUED | OUTPATIENT
Start: 2018-04-10 | End: 2018-04-10 | Stop reason: HOSPADM

## 2018-04-10 RX ORDER — NALBUPHINE HYDROCHLORIDE 10 MG/ML
5 INJECTION, SOLUTION INTRAMUSCULAR; INTRAVENOUS; SUBCUTANEOUS
Status: DISCONTINUED | OUTPATIENT
Start: 2018-04-10 | End: 2018-04-10 | Stop reason: HOSPADM

## 2018-04-10 RX ORDER — FENTANYL CITRATE 50 UG/ML
50 INJECTION, SOLUTION INTRAMUSCULAR; INTRAVENOUS AS NEEDED
Status: DISCONTINUED | OUTPATIENT
Start: 2018-04-10 | End: 2018-04-10 | Stop reason: HOSPADM

## 2018-04-10 RX ORDER — SODIUM CHLORIDE, SODIUM LACTATE, POTASSIUM CHLORIDE, CALCIUM CHLORIDE 600; 310; 30; 20 MG/100ML; MG/100ML; MG/100ML; MG/100ML
150 INJECTION, SOLUTION INTRAVENOUS CONTINUOUS
Status: DISCONTINUED | OUTPATIENT
Start: 2018-04-10 | End: 2018-04-10 | Stop reason: HOSPADM

## 2018-04-10 RX ORDER — LABETALOL HYDROCHLORIDE 5 MG/ML
INJECTION, SOLUTION INTRAVENOUS AS NEEDED
Status: DISCONTINUED | OUTPATIENT
Start: 2018-04-10 | End: 2018-04-10 | Stop reason: HOSPADM

## 2018-04-10 RX ORDER — INSULIN LISPRO 100 [IU]/ML
INJECTION, SOLUTION INTRAVENOUS; SUBCUTANEOUS ONCE
Status: DISCONTINUED | OUTPATIENT
Start: 2018-04-10 | End: 2018-04-10 | Stop reason: HOSPADM

## 2018-04-10 RX ADMIN — MIDAZOLAM HYDROCHLORIDE 2 MG: 1 INJECTION, SOLUTION INTRAMUSCULAR; INTRAVENOUS at 08:28

## 2018-04-10 RX ADMIN — HYDRALAZINE HYDROCHLORIDE 10 MG: 20 INJECTION INTRAMUSCULAR; INTRAVENOUS at 08:37

## 2018-04-10 RX ADMIN — LABETALOL HYDROCHLORIDE 5 MG: 5 INJECTION, SOLUTION INTRAVENOUS at 08:37

## 2018-04-10 RX ADMIN — LIDOCAINE HYDROCHLORIDE 100 MG: 20 INJECTION, SOLUTION EPIDURAL; INFILTRATION; INTRACAUDAL; PERINEURAL at 08:39

## 2018-04-10 RX ADMIN — DEXTROSE MONOHYDRATE AND SODIUM CHLORIDE: 5; .225 INJECTION, SOLUTION INTRAVENOUS at 07:10

## 2018-04-10 RX ADMIN — DEXTROSE MONOHYDRATE AND SODIUM CHLORIDE 25 ML/HR: 5; .225 INJECTION, SOLUTION INTRAVENOUS at 08:00

## 2018-04-10 RX ADMIN — SODIUM CHLORIDE 20 MG: 9 INJECTION INTRAMUSCULAR; INTRAVENOUS; SUBCUTANEOUS at 08:00

## 2018-04-10 RX ADMIN — PROPOFOL 100 MG: 10 INJECTION, EMULSION INTRAVENOUS at 08:39

## 2018-04-10 NOTE — ANESTHESIA PREPROCEDURE EVALUATION
Anesthetic History   No history of anesthetic complications            Review of Systems / Medical History  Patient summary reviewed and pertinent labs reviewed    Pulmonary        Sleep apnea: CPAP           Neuro/Psych   Within defined limits           Cardiovascular    Hypertension: well controlled      CHF: NYHA Classification II, dyspnea on exertion    Hyperlipidemia    Exercise tolerance: <4 METS     GI/Hepatic/Renal     GERD: well controlled    Renal disease: ESRD and dialysis       Endo/Other    Diabetes: well controlled, type 2    Arthritis    Comments: sarcoidosis Other Findings   Comments: Documentation of current medication  Current medications obtained, documented and obtained? YES      Risk Factors for Postoperative nausea/vomiting:       History of postoperative nausea/vomiting? NO       Female? NO       Motion sickness? NO       Intended opioid administration for postoperative analgesia? NO      Smoking Abstinence:  Current Smoker? NO  Elective Surgery? YES  Seen preoperatively by anesthesiologist or proxy prior to day of surgery? YES  Pt abstained from smoking 24 hours prior to anesthesia?  YES    Preventive care/screening for High Blood Pressure:  Aged 18 years and older: YES  Screened for high blood pressure: YES  Patients with high blood pressure referred to primary care provider   for BP management: YES                 Physical Exam    Airway  Mallampati: I  TM Distance: > 6 cm  Neck ROM: normal range of motion   Mouth opening: Normal     Cardiovascular  Regular rate and rhythm,  S1 and S2 normal,  no murmur, click, rub, or gallop  Rhythm: regular  Rate: normal         Dental    Dentition: Poor dentition     Pulmonary  Breath sounds clear to auscultation               Abdominal  GI exam deferred       Other Findings            Anesthetic Plan    ASA: 3  Anesthesia type: MAC          Induction: Intravenous  Anesthetic plan and risks discussed with: Patient

## 2018-04-10 NOTE — DISCHARGE INSTRUCTIONS
Colonoscopy: What to Expect at [address] [place] [address]  After you have a colonoscopy, you will stay at the clinic for 1 to 2 hours until the medicines wear off. Then you can go home. But you will need to arrange for a ride. Your doctor will tell you when you can eat and do your other usual activities. Your doctor will talk to you about when you will need your next colonoscopy. Your doctor can help you decide how often you need to be checked. This will depend on the results of your test and your risk for colorectal cancer. After the test, you may be bloated or have gas pains. You may need to pass gas. If a biopsy was done or a polyp was removed, you may have streaks of blood in your stool (feces) for a few days. This care sheet gives you a general idea about how long it will take for you to recover. But each person recovers at a different pace. Follow the steps below to get better as quickly as possible. How can you care for yourself at home? Activity  ? · Rest when you feel tired. ? · You can do your normal activities when it feels okay to do so. Diet  ? · Follow your doctor's directions for eating. ? · Unless your doctor has told you not to, drink plenty of fluids. This helps to replace the fluids that were lost during the colon prep. ? · Do not drink alcohol. Medicines  ? · Your doctor will tell you if and when you can restart your medicines. He or she will also give you instructions about taking any new medicines. ? · If you take blood thinners, such as warfarin (Coumadin), clopidogrel (Plavix), or aspirin, be sure to talk to your doctor. He or she will tell you if and when to start taking those medicines again. Make sure that you understand exactly what your doctor wants you to do. ? · If polyps were removed or a biopsy was done during the test, your doctor may tell you not to take aspirin or other anti-inflammatory medicines for a few days.  These include ibuprofen (Advil, Motrin) and naproxen (Gogo). Other instructions  ? · For your safety, do not drive or operate machinery until the medicine wears off and you can think clearly. Your doctor may tell you not to drive or operate machinery until the day after your test.   ? · Do not sign legal documents or make major decisions until the medicine wears off and you can think clearly. The anesthesia can make it hard for you to fully understand what you are agreeing to. Follow-up care is a key part of your treatment and safety. Be sure to make and go to all appointments, and call your doctor if you are having problems. It's also a good idea to know your test results and keep a list of the medicines you take. When should you call for help? Call 911 anytime you think you may need emergency care. For example, call if:  ? · You passed out (lost consciousness). ? · You pass maroon or bloody stools. ? · You have trouble breathing. ?Call your doctor now or seek immediate medical care if:  ? · You have pain that does not get better after you take pain medicine. ? · You are sick to your stomach or cannot drink fluids. ? · You have new or worse belly pain. ? · You have blood in your stools. ? · You have a fever. ? · You cannot pass stools or gas. ? Watch closely for changes in your health, and be sure to contact your doctor if you have any problems. Where can you learn more? Go to http://sarwat-janeth.info/. Enter E264 in the search box to learn more about \"Colonoscopy: What to Expect at Home. \"  Current as of: May 12, 2017  Content Version: 11.4  © 6742-2880 [employer]. Care instructions adapted under license by [employer] (which disclaims liability or warranty for this information). If you have questions about a medical condition or this instruction, always ask your healthcare professional. [address] any warranty or liability for your use of this information.   Colon Polyps: Care Instructions  Your Care Instructions    Colon polyps are growths in the colon or the rectum. The cause of most colon polyps is not known, and most people who get them do not have any problems. But a certain kind can turn into cancer. For this reason, regular testing for colon polyps is important for people age 48 and older and anyone who has an increased risk for colon cancer. Polyps are usually found through routine colon cancer screening tests. Although most colon polyps are not cancerous, they are usually removed and then tested for cancer. Screening for colon cancer saves lives because the cancer can usually be cured if it is caught early. If you have a polyp that is the type that can turn into cancer, you may need more tests to examine your entire colon. The doctor will remove any other polyps that he or she finds, and you will be tested more often. Follow-up care is a key part of your treatment and safety. Be sure to make and go to all appointments, and call your doctor if you are having problems. It's also a good idea to know your test results and keep a list of the medicines you take. How can you care for yourself at home? Regular exams to look for colon polyps are the best way to prevent polyps from turning into colon cancer. These can include stool tests, sigmoidoscopy, colonoscopy, and CT colonography. Talk with your doctor about a testing schedule that is right for you. To prevent polyps  There is no home treatment that can prevent colon polyps. But these steps may help lower your risk for cancer. · Stay active. Being active can help you get to and stay at a healthy weight. Try to exercise on most days of the week. Walking is a good choice. · Eat well. Choose a variety of vegetables, fruits, legumes (such as peas and beans), fish, poultry, and whole grains. · Do not smoke. If you need help quitting, talk to your doctor about stop-smoking programs and medicines.  These can increase your chances of quitting for good. · If you drink alcohol, limit how much you drink. Limit alcohol to 2 drinks a day for men and 1 drink a day for women. When should you call for help? Call your doctor now or seek immediate medical care if:  ? · You have severe belly pain. ? · Your stools are maroon or very bloody. ? Watch closely for changes in your health, and be sure to contact your doctor if:  ? · You have a fever. ? · You have nausea or vomiting. ? · You have a change in bowel habits (new constipation or diarrhea). ? · Your symptoms get worse or are not improving as expected. Where can you learn more? Go to http://sarwat-janeth.info/. Enter 95 [id number] in the search box to learn more about \"Colon Polyps: Care Instructions. \"  Current as of: May 12, 2017  Content Version: 11.4  © 0601-2468 [employer]. Care instructions adapted under license by [employer] (which disclaims liability or warranty for this information). If you have questions about a medical condition or this instruction, always ask your healthcare professional. [place] [address] any warranty or liability for your use of this information. High-Fiber Diet: Care Instructions  Your Care Instructions    A high-fiber diet may help you relieve constipation and feel less bloated. Your doctor and dietitian will help you make a high-fiber eating plan based on your personal needs. The plan will include the things you like to eat. It will also make sure that you get 30 grams of fiber a day. Before you make changes to the way you eat, be sure to talk with your doctor or dietitian. Follow-up care is a key part of your treatment and safety. Be sure to make and go to all appointments, and call your doctor if you are having problems. It's also a good idea to know your test results and keep a list of the medicines you take. How can you care for yourself at home?   · You can increase how much fiber you get if you eat more of certain foods. These foods include:  ¨ Whole-grain breads and cereals. ¨ Fruits, such as pears, apples, and peaches. Eat the skins, peels, and seeds, if you can. ¨ Vegetables, such as broccoli, cabbage, spinach, carrots, asparagus, and squash. ¨ Starchy vegetables. These include potatoes with skins, kidney beans, and lima beans. · Take a fiber supplement every day if your doctor recommends it. Examples are Benefiber, Citrucel, FiberCon, and Metamucil. Ask your doctor how much to take. · Drink plenty of fluids, enough so that your urine is light yellow or clear like water. If you have kidney, heart, or liver disease and have to limit fluids, talk with your doctor before you increase the amount of fluids you drink. · Get some exercise every day. Exercise helps stool move through the colon. It also helps prevent constipation. · Keep a food diary. Try to notice and write down what foods cause gas, pain, or other symptoms. Then you can avoid these foods. Where can you learn more? Go to http://sarwat-janeth.info/. Enter W479 in the search box to learn more about \"High-Fiber Diet: Care Instructions. \"  Current as of: May 12, 2017  Content Version: 11.4  © 4913-4973 [employer]. Care instructions adapted under license by [employer] (which disclaims liability or warranty for this information). If you have questions about a medical condition or this instruction, always ask your healthcare professional. [place] [address] any warranty or liability for your use of this information.     DISCHARGE SUMMARY from Nurse    PATIENT INSTRUCTIONS:    After general anesthesia or intravenous sedation, for 24 hours or while taking prescription Narcotics:  · Limit your activities  · Do not drive and operate hazardous machinery  · Do not make important personal or business decisions  · Do  not drink alcoholic beverages  · If you have not urinated within 8 hours after discharge, please contact your surgeon on call. Report the following to your surgeon:  · Excessive pain, swelling, redness or odor of or around the surgical area  · Temperature over 100.5  · Nausea and vomiting lasting longer than 4 hours or if unable to take medications  · Any signs of decreased circulation or nerve impairment to extremity: change in color, persistent  numbness, tingling, coldness or increase pain  · Any questions    *  Please give a list of your current medications to your Primary Care Provider. *  Please update this list whenever your medications are discontinued, doses are      changed, or new medications (including over-the-counter products) are added. *  Please carry medication information at all times in case of emergency situations. These are general instructions for a healthy lifestyle:    No smoking/ No tobacco products/ Avoid exposure to second hand smoke  Surgeon General's Warning:  Quitting smoking now greatly reduces serious risk to your health. Obesity, smoking, and sedentary lifestyle greatly increases your risk for illness    A healthy diet, regular physical exercise & weight monitoring are important for maintaining a healthy lifestyle    You may be retaining fluid if you have a history of heart failure or if you experience any of the following symptoms:  Weight gain of 3 pounds or more overnight or 5 pounds in a week, increased swelling in our hands or feet or shortness of breath while lying flat in bed. Please call your doctor as soon as you notice any of these symptoms; do not wait until your next office visit. Recognize signs and symptoms of STROKE:    F-face looks uneven    A-arms unable to move or move unevenly    S-speech slurred or non-existent    T-time-call 911 as soon as signs and symptoms begin-DO NOT go       Back to bed or wait to see if you get better-TIME IS BRAIN.     Warning Signs of HEART ATTACK     Call 911 if you have these symptoms:   Chest discomfort. Most heart attacks involve discomfort in the center of the chest that lasts more than a few minutes, or that goes away and comes back. It can feel like uncomfortable pressure, squeezing, fullness, or pain.  Discomfort in other areas of the upper body. Symptoms can include pain or discomfort in one or both arms, the back, neck, jaw, or stomach.  Shortness of breath with or without chest discomfort.  Other signs may include breaking out in a cold sweat, nausea, or lightheadedness. Don't wait more than five minutes to call 911 - MINUTES MATTER! Fast action can save your life. Calling 911 is almost always the fastest way to get lifesaving treatment. Emergency Medical Services staff can begin treatment when they arrive -- up to an hour sooner than if someone gets to the hospital by car. The discharge information has been reviewed with the patient and spouse. The patient and spouse verbalized understanding. Discharge medications reviewed with the patient and spouse and appropriate educational materials and side effects teaching were provided.   ___________________________________________________________________________________________________________________________________

## 2018-04-10 NOTE — H&P
[facility]    [email or web address]      Impression: 1. Hx of TA      Plan:   colo  1. Chief Complaint: Hx of TA      HPI:  Claribel Ivan. is a 57 y.o. male who is being seen on consult for the above. Pt has a Hx of TA, he is scheduled for kidney transplant.      PMH:   Past Medical History:   Diagnosis Date    Acute hearing loss of right ear     Acute urinary retention 8/15    Anemia     Anemia of chronic disease 2010    saw Dr. Farrukh Carty in past    Asbestosis(501)     Chronic edema     Chronic kidney disease     HD- W-W-F    Colon adenoma     Dr. Julianne Torres 2009, 2/15    Diabetes mellitus ([facility] [address]     w neuropathy and retinopathy Dr. Shannan Mae DJD (degenerative joint disease)     Dyslipidemia     Erectile dysfunction     ESRD on hemodialysis ([facility] [address]     Dr. Pedro Jacobs, M-W-F    FHx: heart disease     GERD (gastroesophageal reflux disease)     Glaucoma     Dr. Pascual Right failure [facility]) 2011    HTN (hypertension)     Kidney stone     Sarcoidosis     Sleep apnea     he does not use his cpap machine (states resolved)    Thyroid disease        PSH:   Past Surgical History:   Procedure Laterality Date    Tiki Kingk HX COLONOSCOPY      Dr. Julianne Torres adenoma 2009, 2/15    HX ORTHOPAEDIC  01/2018    left wrist and humerus fx Dr Weston Chen  9/15    cystoscopy negative Dr Alli Hicks  2009    negative    VASCULAR SURGERY PROCEDURE UNLIST  3/14    nl BALDEMAR bilat    VASCULAR SURGERY PROCEDURE UNLIST      LEFT ARM GRAFT FOR HD       Social HX:   Social History     Social History    Marital status:      Spouse name: N/A    Number of children: 3    Years of education: N/A     Occupational History    [employer] NN      Social History Main Topics    Smoking status: Never Smoker    Smokeless tobacco: Never Used    Alcohol use No    Drug use: No    Sexual activity: No Other Topics Concern    Not on file     Social History Narrative       FHX:   Family History   Problem Relation Age of Onset    Diabetes Father     Heart Disease Sister     Diabetes Brother     Heart Disease Brother     Diabetes Brother     Diabetes Sister        Allergy:   No Known Allergies    Home Medications:     Prescriptions Prior to Admission   Medication Sig    cloNIDine HCl (CATAPRES) 0.1 mg tablet Take  by mouth two (2) times a day.  docusate sodium (COLACE) 100 mg capsule Take 100 mg by mouth two (2) times a day.  pantoprazole (PROTONIX) 40 mg tablet Take 1 Tab by mouth two (2) times a day.  atorvastatin (LIPITOR) 40 mg tablet Take 1 Tab by mouth daily.  cinnamon bark (CINNAMON) 500 mg cap 500 mg.  timolol (TIMOPTIC) 0.5 % ophthalmic solution     cinacalcet (SENSIPAR) 30 mg tablet Take 30 mg by mouth daily.  aspirin delayed-release 81 mg tablet Take  by mouth daily.  isosorbide dinitrate (ISORDIL) 20 mg tablet Take 20 mg by mouth two (2) times a day.  NIFEdipine XL (PROCARDIA-XL) 90 mg tablet Take 60 mg by mouth daily.  sevelamer carbonate (RENVELA) 800 mg Tab tab Take  by mouth three (3) times daily.  carvedilol (COREG) 25 mg tablet Take 25 mg by mouth two (2) times a day.  MULTIVITAMIN PO Take  by mouth. Review of Systems:     Constitutional: No fevers, chills, weight loss, fatigue. Skin: No rashes, pruritis, jaundice, ulcerations, erythema. HENT: No headaches, nosebleeds, sinus pressure, rhinorrhea, sore throat. Eyes: No visual changes, blurred vision, eye pain, photophobia, jaundice. Cardiovascular: No chest pain, heart palpitations. Respiratory: No cough, SOB, wheezing, chest discomfort, orthopnea. Gastrointestinal: neg   Genitourinary: No dysuria, bleeding, discharge, pyuria. Musculoskeletal: No weakness, arthralgias, wasting. Endo: No sweats. Heme: No bruising, easy bleeding. Allergies: As noted.    Neurological: Cranial nerves intact. Alert and oriented. Gait not assessed. Psychiatric:  No anxiety, depression, hallucinations. Visit Vitals    /83    Pulse 66    Temp 97.6 °F (36.4 °C)    Resp 16    Ht 5' 11.5\" (1.816 m)    Wt 76.2 kg (168 lb)    SpO2 100%    BMI 23.1 kg/m2       Physical Assessment:     constitutional: appearance: well developed, well nourished, normal habitus, no deformities, in no acute distress. skin: inspection: no rashes, ulcers, icterus or other lesions; no clubbing or telangiectasias. palpation: no induration or subcutaneos nodules. eyes: inspection: normal conjunctivae and lids; no jaundice pupils: symmetrical, normoreactive to light, normal accommodation and size. ENMT: mouth: normal oral mucosa,lips and gums; good dentition. oropharynx: normal tongue, hard and soft palate; posterior pharynx without erithema, exudate or lesions. neck: thyroid: normal size, consistency and position; no masses or tenderness. respiratory: effort: normal chest excursion; no intercostal retraction or accessory muscle use. cardiovascular: abdominal aorta: normal size and position; no bruits. palpation: PMI of normal size and position; normal rhythm; no thrill or murmurs. abdominal: abdomen: normal consistency; no tenderness or masses. hernias: no hernias appreciated. liver: normal size and consistency. spleen: not palpable. rectal: hemoccult/guaiac: not performed. musculoskeletal: digits and nails: no clubbing, cyanosis, petechiae or other inflammatory conditions. gait: normal gait and station head and neck: normal range of motion; no pain, crepitation or contracture. spine/ribs/pelvis: normal range of motion; no pain, deformity or contracture. lymphatic: axilae: not palpable. groin: not palpable. neck: within normal limits. other: not palpable. neurologic: cranial nerves: II-XII normal.   psychiatric: judgement/insight: within normal limits.  memory: within normal limits for recent and remote events. mood and affect: no evidence of depression, anxiety or agitation. orientation: oriented to time, space and person. Basic Metabolic Profile   No results for input(s): NA, K, CL, CO2, BUN, GLU, CA, MG, PHOS in the last 72 hours. No lab exists for component: CREAT      CBC w/Diff    Recent Labs      04/10/18   0747   WBC  5.3   RBC  4.01*   HGB  12.0*   HCT  35.8*   MCV  89.3   MCH  29.9   MCHC  33.5   RDW  17.3*   PLT  182    No results for input(s): GRANS, LYMPH, EOS, PRO, MYELO, METAS, BLAST in the last 72 hours. No lab exists for component: MONO, BASO     Hepatic Function   No results for input(s): ALB, TP, TBILI, GPT, SGOT, AP, AML, LPSE in the last 72 hours. No lab exists for component: Chaya Castellanos MD, M.D. [facility] [place] [address], [address] [facility]  [email or web address]

## 2018-04-10 NOTE — ANESTHESIA POSTPROCEDURE EVALUATION
Post-Anesthesia Evaluation and Assessment    Patient: Osei Melo. MRN: [id number]  SSN: [id number]    YOB: 1955  Age: 57 y.o. Sex: male       Cardiovascular Function/Vital Signs  Visit Vitals    /61    Pulse 72    Temp 36.7 °C (98.1 °F)    Resp 19    Ht 5' 11.5\" (1.816 m)    Wt 76.2 kg (168 lb)    SpO2 99%    BMI 23.1 kg/m2       Patient is status post MAC anesthesia for Procedure(s):  COLONOSCOPY with Polypectomies. Nausea/Vomiting: None    Postoperative hydration reviewed and adequate. Pain:  Pain Scale 1: Visual (04/10/18 0856)  Pain Intensity 1: 0 (04/10/18 0856)   Managed    Neurological Status:   Neuro (WDL): Within Defined Limits (04/10/18 0856)   At baseline    Mental Status and Level of Consciousness: Arousable    Pulmonary Status:   O2 Device: Room air (04/10/18 0859)   Adequate oxygenation and airway patent    Complications related to anesthesia: None    Post-anesthesia assessment completed.  No concerns        Signed By: Vern Farfan MD     April 10, 2018

## 2018-04-10 NOTE — IP AVS SNAPSHOT
[address] [facility] 
 
 
 [address] Patient: Hesham Quintero Sr. MRN: [id number] UAD:[id number] About your hospitalization You were admitted on:  April 10, 2018 You last received care in the:  [facility] PHASE 2 RECOVERY You were discharged on:  April 10, 2018 Why you were hospitalized Your primary diagnosis was:  Not on File Follow-up Information Follow up With Details Comments Contact Info Dayan Shepard MD   [address] [facility] [address] [facility] [address] 
[phone] Marylou Silverio MD   [address] [place] [address] [address] [facility] [address] 
[phone] Discharge Orders None A check dennis indicates which time of day the medication should be taken. My Medications CONTINUE taking these medications Instructions Each Dose to Equal  
 Morning Noon Evening Bedtime  
 aspirin delayed-release 81 mg tablet Your last dose was: Your next dose is: Take  by mouth daily. atorvastatin 40 mg tablet Commonly known as:  LIPITOR Your last dose was: Your next dose is: Take 1 Tab by mouth daily. 40 mg  
    
   
   
   
  
 carvedilol 25 mg tablet Commonly known as:  Deleta Joao Your last dose was: Your next dose is: Take 25 mg by mouth two (2) times a day. 25 mg  
    
   
   
   
  
 CINNAMON 500 mg Cap Generic drug:  cinnamon bark Your last dose was: Your next dose is:    
   
   
 500 mg.  
 500 mg  
    
   
   
   
  
 cloNIDine HCl 0.1 mg tablet Commonly known as:  CATAPRES Your last dose was: Your next dose is: Take  by mouth two (2) times a day. docusate sodium 100 mg capsule Commonly known as:  Kristen Obrien Your last dose was: Your next dose is: Take 100 mg by mouth two (2) times a day. 100 mg  
    
   
   
   
  
 isosorbide dinitrate 20 mg tablet Commonly known as:  ISORDIL Your last dose was: Your next dose is: Take 20 mg by mouth two (2) times a day. 20 mg  
    
   
   
   
  
 MULTIVITAMIN PO Your last dose was: Your next dose is: Take  by mouth. NIFEdipine ER 90 mg ER tablet Commonly known as:  PROCARDIA XL Your last dose was: Your next dose is: Take 60 mg by mouth daily. 60 mg  
    
   
   
   
  
 pantoprazole 40 mg tablet Commonly known as:  PROTONIX Your last dose was: Your next dose is: Take 1 Tab by mouth two (2) times a day. 40 mg  
    
   
   
   
  
 RENVELA 800 mg Tab tab Generic drug:  sevelamer carbonate Your last dose was: Your next dose is: Take  by mouth three (3) times daily. SENSIPAR 30 mg tablet Generic drug:  cinacalcet Your last dose was: Your next dose is: Take 30 mg by mouth daily. 30 mg  
    
   
   
   
  
 timolol 0.5 % ophthalmic solution Commonly known as:  TIMOPTIC Your last dose was: Your next dose is:    
   
   
      
   
   
   
  
  
  
  
Discharge Instructions Colonoscopy: What to Expect at [facility] Your Recovery After you have a colonoscopy, you will stay at the clinic for 1 to 2 hours until the medicines wear off. Then you can go home. But you will need to arrange for a ride. Your doctor will tell you when you can eat and do your other usual activities. Your doctor will talk to you about when you will need your next colonoscopy. Your doctor can help you decide how often you need to be checked. This will depend on the results of your test and your risk for colorectal cancer. After the test, you may be bloated or have gas pains.  You may need to pass gas. If a biopsy was done or a polyp was removed, you may have streaks of blood in your stool (feces) for a few days. This care sheet gives you a general idea about how long it will take for you to recover. But each person recovers at a different pace. Follow the steps below to get better as quickly as possible. How can you care for yourself at home? Activity ? · Rest when you feel tired. ? · You can do your normal activities when it feels okay to do so. Diet ? · Follow your doctor's directions for eating. ? · Unless your doctor has told you not to, drink plenty of fluids. This helps to replace the fluids that were lost during the colon prep. ? · Do not drink alcohol. Medicines ? · Your doctor will tell you if and when you can restart your medicines. He or she will also give you instructions about taking any new medicines. ? · If you take blood thinners, such as warfarin (Coumadin), clopidogrel (Plavix), or aspirin, be sure to talk to your doctor. He or she will tell you if and when to start taking those medicines again. Make sure that you understand exactly what your doctor wants you to do. ? · If polyps were removed or a biopsy was done during the test, your doctor may tell you not to take aspirin or other anti-inflammatory medicines for a few days. These include ibuprofen (Advil, Motrin) and naproxen (Aleve). Other instructions ? · For your safety, do not drive or operate machinery until the medicine wears off and you can think clearly. Your doctor may tell you not to drive or operate machinery until the day after your test.  
? · Do not sign legal documents or make major decisions until the medicine wears off and you can think clearly. The anesthesia can make it hard for you to fully understand what you are agreeing to. Follow-up care is a key part of your treatment and safety.  Be sure to make and go to all appointments, and call your doctor if you are having problems. It's also a good idea to know your test results and keep a list of the medicines you take. When should you call for help? Call 911 anytime you think you may need emergency care. For example, call if: 
? · You passed out (lost consciousness). ? · You pass maroon or bloody stools. ? · You have trouble breathing. ?Call your doctor now or seek immediate medical care if: 
? · You have pain that does not get better after you take pain medicine. ? · You are sick to your stomach or cannot drink fluids. ? · You have new or worse belly pain. ? · You have blood in your stools. ? · You have a fever. ? · You cannot pass stools or gas. ? Watch closely for changes in your health, and be sure to contact your doctor if you have any problems. Where can you learn more? Go to http://sarwat-janeth.info/. Enter E264 in the search box to learn more about \"Colonoscopy: What to Expect at Home. \" Current as of: May 12, 2017 Content Version: 11.4 © 2181-8544 [employer]. Care instructions adapted under license by [employer] (which disclaims liability or warranty for this information). If you have questions about a medical condition or this instruction, always ask your healthcare professional. [address] any warranty or liability for your use of this information. Colon Polyps: Care Instructions Your Care Instructions Colon polyps are growths in the colon or the rectum. The cause of most colon polyps is not known, and most people who get them do not have any problems. But a certain kind can turn into cancer. For this reason, regular testing for colon polyps is important for people age 48 and older and anyone who has an increased risk for colon cancer. Polyps are usually found through routine colon cancer screening tests.  Although most colon polyps are not cancerous, they are usually removed and then tested for cancer. Screening for colon cancer saves lives because the cancer can usually be cured if it is caught early. If you have a polyp that is the type that can turn into cancer, you may need more tests to examine your entire colon. The doctor will remove any other polyps that he or she finds, and you will be tested more often. Follow-up care is a key part of your treatment and safety. Be sure to make and go to all appointments, and call your doctor if you are having problems. It's also a good idea to know your test results and keep a list of the medicines you take. How can you care for yourself at home? Regular exams to look for colon polyps are the best way to prevent polyps from turning into colon cancer. These can include stool tests, sigmoidoscopy, colonoscopy, and CT colonography. Talk with your doctor about a testing schedule that is right for you. To prevent polyps There is no home treatment that can prevent colon polyps. But these steps may help lower your risk for cancer. · Stay active. Being active can help you get to and stay at a healthy weight. Try to exercise on most days of the week. Walking is a good choice. · Eat well. Choose a variety of vegetables, fruits, legumes (such as peas and beans), fish, poultry, and whole grains. · Do not smoke. If you need help quitting, talk to your doctor about stop-smoking programs and medicines. These can increase your chances of quitting for good. · If you drink alcohol, limit how much you drink. Limit alcohol to 2 drinks a day for men and 1 drink a day for women. When should you call for help? Call your doctor now or seek immediate medical care if: 
? · You have severe belly pain. ? · Your stools are maroon or very bloody. ? Watch closely for changes in your health, and be sure to contact your doctor if: 
? · You have a fever. ? · You have nausea or vomiting. ? · You have a change in bowel habits (new constipation or diarrhea). ? · Your symptoms get worse or are not improving as expected. Where can you learn more? Go to http://sarwat-janeth.info/. Enter 95 [id number] in the search box to learn more about \"Colon Polyps: Care Instructions. \" Current as of: May 12, 2017 Content Version: 11.4 © 5224-2166 [employer]. Care instructions adapted under license by [employer] (which disclaims liability or warranty for this information). If you have questions about a medical condition or this instruction, always ask your healthcare professional. [address] any warranty or liability for your use of this information. High-Fiber Diet: Care Instructions Your Care Instructions A high-fiber diet may help you relieve constipation and feel less bloated. Your doctor and dietitian will help you make a high-fiber eating plan based on your personal needs. The plan will include the things you like to eat. It will also make sure that you get 30 grams of fiber a day. Before you make changes to the way you eat, be sure to talk with your doctor or dietitian. Follow-up care is a key part of your treatment and safety. Be sure to make and go to all appointments, and call your doctor if you are having problems. It's also a good idea to know your test results and keep a list of the medicines you take. How can you care for yourself at home? · You can increase how much fiber you get if you eat more of certain foods. These foods include: ¨ Whole-grain breads and cereals. ¨ Fruits, such as pears, apples, and peaches. Eat the skins, peels, and seeds, if you can. ¨ Vegetables, such as broccoli, cabbage, spinach, carrots, asparagus, and squash. ¨ Starchy vegetables. These include potatoes with skins, kidney beans, and lima beans. · Take a fiber supplement every day if your doctor recommends it. Examples are Benefiber, Citrucel, FiberCon, and Metamucil. Ask your doctor how much to take. · Drink plenty of fluids, enough so that your urine is light yellow or clear like water. If you have kidney, heart, or liver disease and have to limit fluids, talk with your doctor before you increase the amount of fluids you drink. · Get some exercise every day. Exercise helps stool move through the colon. It also helps prevent constipation. · Keep a food diary. Try to notice and write down what foods cause gas, pain, or other symptoms. Then you can avoid these foods. Where can you learn more? Go to http://sarwat-janeth.info/. Enter K246 in the search box to learn more about \"High-Fiber Diet: Care Instructions. \" Current as of: May 12, 2017 Content Version: 11.4 © 6950-6665 [employer]. Care instructions adapted under license by [employer] (which disclaims liability or warranty for this information). If you have questions about a medical condition or this instruction, always ask your healthcare professional. [place] [address] any warranty or liability for your use of this information. DISCHARGE SUMMARY from Nurse PATIENT INSTRUCTIONS: 
 
 
F-face looks uneven A-arms unable to move or move unevenly S-speech slurred or non-existent T-time-call 911 as soon as signs and symptoms begin-DO NOT go Back to bed or wait to see if you get better-TIME IS BRAIN. Warning Signs of HEART ATTACK Call 911 if you have these symptoms: 
? Chest discomfort. Most heart attacks involve discomfort in the center of the chest that lasts more than a few minutes, or that goes away and comes back. It can feel like uncomfortable pressure, squeezing, fullness, or pain. ? Discomfort in other areas of the upper body. Symptoms can include pain or discomfort in one or both arms, the back, neck, jaw, or stomach. ? Shortness of breath with or without chest discomfort. ? Other signs may include breaking out in a cold sweat, nausea, or lightheadedness. Don't wait more than five minutes to call [address]! Fast action can save your life. Calling 911 is almost always the fastest way to get lifesaving treatment. Emergency Medical Services staff can begin treatment when they arrive  up to an hour sooner than if someone gets to the hospital by car. The discharge information has been reviewed with the patient and spouse. The patient and spouse verbalized understanding. Discharge medications reviewed with the patient and spouse and appropriate educational materials and side effects teaching were provided. ___________________________________________________________________________________________________________________________________ ACO Transitions of Care Introducing [facility] [address] Shobha Worley offers a voluntary care coordination program to provide high quality service and care to [place] fee-for-service beneficiaries. Dae Gipson was designed to help you enhance your health and well-being through the following services: ? Transitions of Care  support for individuals who are transitioning from one care setting to another (example: Hospital to home). ? Chronic and Complex Care Coordination  support for individuals and caregivers of those with serious or chronic illnesses or with more than one chronic (ongoing) condition and those who take a number of different medications. If you meet specific medical criteria, a [address] [place] [address] Rd may call you directly to coordinate your care with your primary care physician and your other care providers. For questions about the [facility] programs, please, contact your physicians office. For general questions or additional information about Accountable Care Organizations: 
Please visit www.medicare.gov/acos. html or call 1-800-MEDICARE (4-305.204.8840) TTY users should call 5-698.125.6203. Introducing [facility]! Clara Frias introduces Taxify patient portal. Now you can access parts of your medical record, email your doctor's office, and request medication refills online. 1. In your internet browser, go to [email or web address] 2. Click on the First Time User? Click Here link in the Sign In box. You will see the New Member Sign Up page. 3. Enter your Taxify Access Code exactly as it appears below. You will not need to use this code after youve completed the sign-up process. If you do not sign up before the expiration date, you must request a new code. · Taxify Access Code: [email or web address] Expires: 6/21/2018  2:43 PM 
 
4. Enter the last four digits of your Social Security Number (xxxx) and Date of Birth (mm/dd/yyyy) as indicated and click Submit. You will be taken to the next sign-up page. 5. Create a Taxify ID. This will be your Taxify login ID and cannot be changed, so think of one that is secure and easy to remember. 6. Create a Taxify password. You can change your password at any time. 7. Enter your Password Reset Question and Answer. This can be used at a later time if you forget your password. 8. Enter your e-mail address. You will receive e-mail notification when new information is available in [address]. 9. Click Sign Up. You can now view and download portions of your medical record. 10. Click the Download Summary menu link to download a portable copy of your medical information. If you have questions, please visit the Frequently Asked Questions section of the Taxify website. Remember, Taxify is NOT to be used for urgent needs. For medical emergencies, dial 911. Now available from your iPhone and Android! Introducing Pedro Macdonald As a Clara Frias patient, I wanted to make you aware of our electronic visit tool called Pedro Macdonald. [employer] 24/7 allows you to connect within minutes with a medical provider 24 hours a day, seven days a week via a mobile device or tablet or logging into a secure website from your computer. You can access [employer] from anywhere in the [place]. A virtual visit might be right for you when you have a simple condition and feel like you just dont want to get out of bed, or cant get away from work for an appointment, when your regular [employer] provider is not available (evenings, weekends or holidays), or when youre out of town and need minor care. Electronic visits cost only $49 and if the [employer] 24/7 provider determines a prescription is needed to treat your condition, one can be electronically transmitted to a nearby pharmacy*. Please take a moment to enroll today if you have not already done so. The enrollment process is free and takes just a few minutes. To enroll, please download the [employer] 24/7 niles to your tablet or phone, or visit [email or web address] to enroll on your computer. And, as an [address] [place] [address] patient with a OneSpot account, the results of your visits will be scanned into your electronic medical record and your primary care provider will be able to view the scanned results. We urge you to continue to see your regular [employer] provider for your ongoing medical care. And while your primary care provider may not be the one available when you seek a AMTsamuelfin virtual visit, the peace of mind you get from getting a real diagnosis real time can be priceless. For more information on AMTsamuelfin, view our Frequently Asked Questions (FAQs) at [email or web address]. Sincerely, 
 
Víctor Cavanaugh MD 
Chief Medical Officer Deborah Worley *:  certain medications cannot be prescribed via Outerstuff Providers Seen During Your Hospitalization Provider Specialty Primary office phone Lorraine Dinh MD Gastroenterology [phone] Your Primary Care Physician (PCP) Primary Care Physician Office Phone Office Fax Sharri Srinivasan [phone] You are allergic to the following No active allergies Recent Documentation Height Weight BMI Smoking Status 1.816 m 76.2 kg 23.1 kg/m2 Never Smoker Emergency Contacts Name Discharge Info Relation Home Work Mobile Denita Bingham DISCHARGE CAREGIVER [3] Spouse [3] [phone] Patient Belongings The following personal items are in your possession at time of discharge: 
  Dental Appliances: None  Visual Aid: None Please provide this summary of care documentation to your next provider. Signatures-by signing, you are acknowledging that this After Visit Summary has been reviewed with you and you have received a copy. Patient Signature:  ____________________________________________________________ Date:  ____________________________________________________________  
  
[facility] Provider Signature:  ____________________________________________________________ Date:  ____________________________________________________________

## 2018-04-10 NOTE — PROGRESS NOTES
conducted a pre-op visit with Gunnar Champion, who is a 57 y.o.,male. The  provided the following Interventions:  Initiated a relationship of care and support. Plan:  Chaplains will continue to follow and will provide pastoral care on an as needed/requested basis.  recommends bedside caregivers page  on duty if patient shows signs of acute spiritual or emotional distress.     [address] [facility] Certified [address] [place] [address]   [phone]

## 2018-04-29 DIAGNOSIS — K21.9 GERD WITHOUT ESOPHAGITIS: ICD-10-CM

## 2018-04-30 RX ORDER — PANTOPRAZOLE SODIUM 40 MG/1
TABLET, DELAYED RELEASE ORAL
Qty: 60 TAB | Refills: 0 | Status: SHIPPED | OUTPATIENT
Start: 2018-04-30 | End: 2018-07-07 | Stop reason: SDUPTHER

## 2018-06-11 NOTE — TELEPHONE ENCOUNTER
isosorbide dinitrate (ISORDIL) 20 mg tablet [[id number]]     Patient calling, says he was told by pharmacy that insurance no longer covers this med. Says they told him doctor can put in a request that they cover it. He is not sure what the pharmacy was talking about. Sia Shen it was some type of over ride. Does it require a prior Auth?

## 2018-06-11 NOTE — TELEPHONE ENCOUNTER
Yes that is a prior [place]. It is not a automatic override. It is still up to patients insurance whether medication is approved or not.

## 2018-06-12 NOTE — TELEPHONE ENCOUNTER
Patient called again regarding his prescription prior auth., and said that he had just called his pharmacy. He was told by the pharmacy to call the doctor's office and ask us to send an Janina Reach. I told the patient that we were still waiting for a response from the insurance company. I told him that I would document our conversation, and asked the patient to please give the insurance company more time.

## 2018-06-14 NOTE — TELEPHONE ENCOUNTER
The patient called to update the office that another doctor's office was filling his prescription for Isordil 20mg because the insurance faxed it to a different doctor.

## 2018-06-14 NOTE — TELEPHONE ENCOUNTER
Called and spoke to the patient. Patient stated that the pharmacy told him that the medication was denied by insurance and that they didn't have our phone number to send us a prior authorization request.  Patient stated that he has given the pharmacy our numbers and they will send it shortly.

## 2018-06-26 RX ORDER — ISOSORBIDE DINITRATE 20 MG/1
20 TABLET ORAL 2 TIMES DAILY
Qty: 180 TAB | Refills: 3 | Status: SHIPPED | OUTPATIENT
Start: 2018-06-26 | End: 2020-02-23

## 2018-06-26 NOTE — TELEPHONE ENCOUNTER
Patient stating dr. Emilia Varner refused to fill rx. Must be filled by PCP. Still requiring prior auth.

## 2018-07-03 DIAGNOSIS — Z99.2 END STAGE RENAL DISEASE ON DIALYSIS (HCC): Primary | ICD-10-CM

## 2018-07-03 DIAGNOSIS — N18.6 END STAGE RENAL DISEASE ON DIALYSIS (HCC): Primary | ICD-10-CM

## 2018-07-03 NOTE — PROGRESS NOTES
Patient's dialysis unit has notified our office that patient's left arm dialysis graft is running at too low of flow rate and have clots at end of treatment as well. Reviewed with WANDA Jacobsen and an ultrasound has been ordered to assess access. Patient states understands and agrees with plan.

## 2018-07-03 NOTE — TELEPHONE ENCOUNTER
Spoke with Lesley Avlia in . [employer]. Asked if they could fax over that patient needs a prior auth so that we would initiate the prior auth.

## 2018-07-05 ENCOUNTER — DOCUMENTATION ONLY (OUTPATIENT)
Dept: INTERNAL MEDICINE CLINIC | Age: 63
End: 2018-07-05

## 2018-07-05 ENCOUNTER — TELEPHONE (OUTPATIENT)
Dept: INTERNAL MEDICINE CLINIC | Age: 63
End: 2018-07-05

## 2018-07-07 DIAGNOSIS — K21.9 GERD WITHOUT ESOPHAGITIS: ICD-10-CM

## 2018-07-08 RX ORDER — PANTOPRAZOLE SODIUM 40 MG/1
TABLET, DELAYED RELEASE ORAL
Qty: 60 TAB | Refills: 0 | Status: SHIPPED | OUTPATIENT
Start: 2018-07-08 | End: 2018-09-06 | Stop reason: SDUPTHER

## 2018-07-20 ENCOUNTER — TELEPHONE (OUTPATIENT)
Dept: VASCULAR SURGERY | Age: 63
End: 2018-07-20

## 2018-07-20 NOTE — TELEPHONE ENCOUNTER
Patient had ultrasound of left arm dialysis access completed and had WANDA Jacobsen review and patient requires a fistulogram.  Discussed with patient who is in agreement and will have surgery scheduler contact patient with date and time.

## 2018-07-31 NOTE — H&P
Surgery History and Physical 
 
Subjective:  
  
Rochelle Ruiz is a 57 y.o.  male who presents with ESRD. Due to issues with his access, duplex was ordered. He has an area of severe narrowing at the outflow, so fistulogram/intervention has been suggested Patient Active Problem List  
 Diagnosis Date Noted  Diabetic retinopathy ([facility] 01/24/2016  Type 2 diabetes, controlled, [facility] Dr Sandra Bourne 01/24/2016  Advance directive discussed with patient 09/24/2015  End stage renal disease on dialysis ([facility] 08/13/2015  Primary hypertension 08/04/2015  GERD without esophagitis 08/04/2015  Arthritis, degenerative 08/04/2015  Dyslipidemia 09/12/2013  Sarcoidosis  Colon adenoma 2/15 Dr. Avila Baumann 11/21/2011  Erectile dysfunction 11/21/2011  Sleep apnea Dr. Delisa Tinsley Past Medical History:  
Diagnosis Date  Acute hearing loss of right ear  Acute urinary retention 8/15  Anemia  Anemia of chronic disease 2010  
 saw Dr. Ervin Andrea in past  
 Asbestosis(501)  Chronic edema  Chronic kidney disease HD- W-W-F  Colon adenoma Dr. Avila Baumann 2009, 2/15, 4/18  Diabetes mellitus (HCC)   
 w neuropathy and retinopathy Dr. Marleen Mohamud  DJDARCI (degenerative joint disease)  Dyslipidemia  Erectile dysfunction  ESRD on hemodialysis ([facility] Dr. Carla Roman, M-W-F  FHx: heart disease  GERD (gastroesophageal reflux disease)  Glaucoma Dr. Tacho Mederos failure [facility]) 2011  
 HTN (hypertension)  Kidney stone  Sarcoidosis  Sleep apnea   
 he does not use his cpap machine (states resolved)  Thyroid disease Past Surgical History:  
Procedure Laterality Date  COLONOSCOPY N/A 4/10/2018 Dr. Avila Baumann adenoma 2009, 2/15; adenoma and hyperplastic 4/10/18  HX CATARACT REMOVAL    
 HX ORTHOPAEDIC  01/2018  
 left wrist and humerus fx Dr Oneida Cope  HX UROLOGICAL  9/15  
 cystoscopy negative Dr Kay Parsons  STRESS TEST THALLIUM STUDY  2009  
 negative  VASCULAR SURGERY PROCEDURE UNLIST  3/14  
 nl BALDEMAR bilat  VASCULAR SURGERY PROCEDURE UNLIST    
 LEFT ARM GRAFT FOR HD Social History Substance Use Topics  Smoking status: Never Smoker  Smokeless tobacco: Never Used  Alcohol use No  
  
Family History Problem Relation Age of Onset  Diabetes Father  Heart Disease Sister  Diabetes Brother  Heart Disease Brother  Diabetes Brother  Diabetes Sister Prior to Admission medications Medication Sig Start Date End Date Taking? Authorizing Provider  
pantoprazole (PROTONIX) 40 mg tablet TAKE 1 TABLET BY MOUTH TWICE DAILY 7/8/18   Tessa Ying MD  
isosorbide dinitrate (ISORDIL) 20 mg tablet Take 1 Tab by mouth two (2) times a day. 6/26/18   Tessa Ying MD  
cloNIDine HCl (CATAPRES) 0.1 mg tablet Take  by mouth two (2) times a day. Historical Provider  
docusate sodium (COLACE) 100 mg capsule Take 100 mg by mouth two (2) times a day. Historical Provider  
atorvastatin (LIPITOR) 40 mg tablet Take 1 Tab by mouth daily. 3/5/18   WANDA Reis  
cinnamon bark (CINNAMON) 500 mg cap 500 mg. Historical Provider  
timolol (TIMOPTIC) 0.5 % ophthalmic solution  7/8/15   Historical Provider  
cinacalcet (SENSIPAR) 30 mg tablet Take 30 mg by mouth daily. Historical Provider  
aspirin delayed-release 81 mg tablet Take  by mouth daily. Historical Provider NIFEdipine XL (PROCARDIA-XL) 90 mg tablet Take 60 mg by mouth daily. Historical Provider  
sevelamer carbonate (RENVELA) 800 mg Tab tab Take  by mouth three (3) times daily. Historical Provider  
carvedilol (COREG) 25 mg tablet Take 25 mg by mouth two (2) times a day. Historical Provider MULTIVITAMIN PO Take  by mouth. Historical Provider No Known Allergies Review of Systems:   
Pertinent items are noted in the History of Present Illness. Objective:  
 
No data found.  
 
 
Temp (24hrs), Av °F (-17.8 °C), Min:, Max: 
 
 
Physical Exam: 
GENERAL: alert, cooperative, no distress, appears stated age, LUNG: clear to auscultation bilaterally, HEART: regular rate and rhythm, S1, S2 normal, no murmur, click, rub or gallop, EXTREMITIES:  extremities normal, atraumatic, no cyanosis or edema Assessment:  
 
ESRD Plan:  
 
Left arm fistulogram 
 
Signed By: WANDA Coffman   
 2018

## 2018-08-01 ENCOUNTER — HOSPITAL ENCOUNTER (OUTPATIENT)
Age: 63
Setting detail: OUTPATIENT SURGERY
Discharge: HOME | End: 2018-08-01
Attending: SURGERY | Admitting: SURGERY
Payer: MEDICARE

## 2018-08-01 ENCOUNTER — ANESTHESIA EVENT (OUTPATIENT)
Dept: CARDIAC CATH/INVASIVE PROCEDURES | Age: 63
End: 2018-08-01
Payer: MEDICARE

## 2018-08-01 ENCOUNTER — ANESTHESIA (OUTPATIENT)
Dept: CARDIAC CATH/INVASIVE PROCEDURES | Age: 63
End: 2018-08-01
Payer: MEDICARE

## 2018-08-01 VITALS
BODY MASS INDEX: 23.03 KG/M2 | DIASTOLIC BLOOD PRESSURE: 67 MMHG | HEIGHT: 72 IN | HEART RATE: 64 BPM | SYSTOLIC BLOOD PRESSURE: 135 MMHG | OXYGEN SATURATION: 98 % | RESPIRATION RATE: 24 BRPM | WEIGHT: 170 LBS

## 2018-08-01 DIAGNOSIS — T82.898A ARTERIOVENOUS FISTULA OCCLUSION (HCC): ICD-10-CM

## 2018-08-01 DIAGNOSIS — N18.6 END STAGE RENAL DISEASE (HCC): ICD-10-CM

## 2018-08-01 LAB
BUN BLD-MCNC: 37 MG/DL (ref 7–18)
CHLORIDE BLD-SCNC: 95 MMOL/L (ref 100–108)
GLUCOSE BLD STRIP.AUTO-MCNC: 108 MG/DL (ref 74–106)
HCT VFR BLD CALC: 34 % (ref 36–49)
HGB BLD-MCNC: 11.6 G/DL (ref 12–16)
POTASSIUM BLD-SCNC: 4.5 MMOL/L (ref 3.5–5.5)
SODIUM BLD-SCNC: 136 MMOL/L (ref 136–145)

## 2018-08-01 PROCEDURE — 36902 INTRO CATH DIALYSIS CIRCUIT: CPT | Performed by: SURGERY

## 2018-08-01 PROCEDURE — 77030002916 HC SUT ETHLN J&J -A: Performed by: SURGERY

## 2018-08-01 PROCEDURE — C1725 CATH, TRANSLUMIN NON-LASER: HCPCS | Performed by: SURGERY

## 2018-08-01 PROCEDURE — C1769 GUIDE WIRE: HCPCS | Performed by: SURGERY

## 2018-08-01 PROCEDURE — 77030013515 HC DEV INFL ANGI BSC -B: Performed by: SURGERY

## 2018-08-01 PROCEDURE — 74011000250 HC RX REV CODE- 250: Performed by: SURGERY

## 2018-08-01 PROCEDURE — C1894 INTRO/SHEATH, NON-LASER: HCPCS | Performed by: SURGERY

## 2018-08-01 PROCEDURE — 84520 ASSAY OF UREA NITROGEN: CPT

## 2018-08-01 RX ORDER — LIDOCAINE HYDROCHLORIDE 10 MG/ML
INJECTION, SOLUTION EPIDURAL; INFILTRATION; INTRACAUDAL; PERINEURAL AS NEEDED
Status: DISCONTINUED | OUTPATIENT
Start: 2018-08-01 | End: 2018-08-01 | Stop reason: HOSPADM

## 2018-08-01 RX ORDER — SODIUM CHLORIDE 0.9 % (FLUSH) 0.9 %
5-10 SYRINGE (ML) INJECTION AS NEEDED
Status: DISCONTINUED | OUTPATIENT
Start: 2018-08-01 | End: 2018-08-01 | Stop reason: HOSPADM

## 2018-08-01 RX ORDER — SODIUM CHLORIDE 0.9 % (FLUSH) 0.9 %
5-10 SYRINGE (ML) INJECTION EVERY 8 HOURS
Status: DISCONTINUED | OUTPATIENT
Start: 2018-08-01 | End: 2018-08-01 | Stop reason: HOSPADM

## 2018-08-01 NOTE — Clinical Note
TRANSFER - IN REPORT:  
 
Verbal report received from: [facility]. Report consisted of patient's Situation, Background, Assessment and  
Recommendations(SBAR). Opportunity for questions and clarification was provided. Assessment completed upon patient's arrival to unit and care assumed. Patient transported with a Cardiac Cath Tech / Patient Care Tech.

## 2018-08-01 NOTE — DISCHARGE INSTRUCTIONS
[address] ARTERIOVENOUS FISTULA, GRAFT ACCESS, REVISION, OR DECLOT    ACTIVITY:  Limited activity today. Keep access arm straight and raised above the level of your heart for 24 hours or until the swelling goes away. DIET:  May have your usual food, unless told otherwise by your doctor. PAIN:  Use the prescription for pain medicine your doctor gave you. If you do not have one, usual your normal pain medicine. If this does not control your pain, call your doctor. DO NOT TAKE ASPIRIN OR MEDICINE WITH ASPIRIN IN IT, unless your doctor says you may. DRESSING CARE:   Keep your dressing clean and dry. Leave your dressing on until the Dialysis Nurse or your doctor takes it off. BLEEDING:  If you have any bleeding put pressure over the bleeding area and call your doctor. CARE OF YOUR ACCESS ARM:  You may have some bruising, swelling, and discoloration for several weeks. After the swelling has gone down, you will be able to see the outline of the graft. By placing your fingertips over the graft you will be able to feel a vibration (thrill) that means blood is flowing and the graft is working. DO:  1. Make sure your arm is washed with soap and water every day. 2. Feel for the [id number] at area marked in the picture. 3. Call your Kidney doctor if you do not feel the [address] or for any problems like swelling, redness, tingling, or numbness in access arm, pus, or fever over 101. DO NOT:  1. Wear tight sleeves, watches, belts, or bracelets over the graft. 2. Carry heavy bags across the graft or fistula. 3. Sleep on your graft side. 4. Let your blood pressure be taken in your access arm. 5. Let blood be drawn from your access arm. For the next 24 hours, DO NOT Drive, Drink Alcoholic Beverages, or Make IMPORTANT Decisions. Follow-Up Instructions:  Please see your ordering doctor as he/she has requested.       DISCHARGE SUMMARY from Nurse    PATIENT INSTRUCTIONS:    After general anesthesia or intravenous sedation, for 24 hours or while taking prescription Narcotics:  · Limit your activities  · Do not drive and operate hazardous machinery  · Do not make important personal or business decisions  · Do  not drink alcoholic beverages  · If you have not urinated within 8 hours after discharge, please contact your surgeon on call. Report the following to your surgeon:  · Excessive pain, swelling, redness or odor of or around the surgical area  · Temperature over 100.5  · Nausea and vomiting lasting longer than 4 hours or if unable to take medications  · Any signs of decreased circulation or nerve impairment to extremity: change in color, persistent  numbness, tingling, coldness or increase pain  · Any questions    What to do at Home:        Please give a list of your current medications to your Primary Care Provider. *  Please update this list whenever your medications are discontinued, doses are      changed, or new medications (including over-the-counter products) are added. *  Please carry medication information at all times in case of emergency situations. These are general instructions for a healthy lifestyle:    No smoking/ No tobacco products/ Avoid exposure to second hand smoke  Surgeon General's Warning:  Quitting smoking now greatly reduces serious risk to your health. Obesity, smoking, and sedentary lifestyle greatly increases your risk for illness    A healthy diet, regular physical exercise & weight monitoring are important for maintaining a healthy lifestyle    You may be retaining fluid if you have a history of heart failure or if you experience any of the following symptoms:  Weight gain of 3 pounds or more overnight or 5 pounds in a week, increased swelling in our hands or feet or shortness of breath while lying flat in bed.   Please call your doctor as soon as you notice any of these symptoms; do not wait until your next office visit. Recognize signs and symptoms of STROKE:    F-face looks uneven    A-arms unable to move or move unevenly    S-speech slurred or non-existent    T-time-call 911 as soon as signs and symptoms begin-DO NOT go       Back to bed or wait to see if you get better-TIME IS BRAIN. Warning Signs of HEART ATTACK     Call 911 if you have these symptoms:   Chest discomfort. Most heart attacks involve discomfort in the center of the chest that lasts more than a few minutes, or that goes away and comes back. It can feel like uncomfortable pressure, squeezing, fullness, or pain.  Discomfort in other areas of the upper body. Symptoms can include pain or discomfort in one or both arms, the back, neck, jaw, or stomach.  Shortness of breath with or without chest discomfort.  Other signs may include breaking out in a cold sweat, nausea, or lightheadedness. Don't wait more than five minutes to call 911 - MINUTES MATTER! Fast action can save your life. Calling 911 is almost always the fastest way to get lifesaving treatment. Emergency Medical Services staff can begin treatment when they arrive -- up to an hour sooner than if someone gets to the hospital by car. The discharge information has been reviewed with the patient and caregiver. The patient and caregiver verbalized understanding. Discharge medications reviewed with the patient and caregiver and appropriate educational materials and side effects teaching were provided.   ___________________________________________________________________________________________________________________________________

## 2018-08-01 NOTE — IP AVS SNAPSHOT
[address] Patient: Dwight Levi MRN: [id number] PQI:[id number] About your hospitalization You were admitted on:  August 1, 2018 You last received care in the:  [facility] CATH LAB You were discharged on:  August 1, 2018 Why you were hospitalized Your primary diagnosis was:  Not on File Your diagnoses also included:  Esrd (End Stage Renal Disease) (Hcc) Follow-up Information Follow up With Details Comments Contact Info Mindi Jeter MD Follow up in 5 day(s) follow up [address] [facility] [address] [facility] [address] 
[phone] Your Scheduled Appointments Thursday August 16, 2018  9:00 AM EDT HOSPITAL DISCHARGE with [facility] ([facility]) 2300 [facility] [address] [facility] [address]  
[phone] Thursday September 20, 2018  8:15 AM EDT  
LAB with [facility] NURSE VISIT [facility] [address] [place] [address] ([facility]) [address] [facility] [address] [facility] [address]  
[phone] Discharge Orders None A check dennis indicates which time of day the medication should be taken. My Medications ASK your doctor about these medications Instructions Each Dose to Equal  
 Morning Noon Evening Bedtime  
 aspirin delayed-release 81 mg tablet Your last dose was: Your next dose is: Take  by mouth daily. carvedilol 25 mg tablet Commonly known as:  Genesis Milan Your last dose was: Your next dose is: Take 25 mg by mouth two (2) times a day. 25 mg  
    
   
   
   
  
 CINNAMON 500 mg Cap Generic drug:  cinnamon bark Your last dose was: Your next dose is:    
   
   
 500 mg.  
 500 mg  
    
   
   
   
  
 cloNIDine HCl 0.1 mg tablet Commonly known as:  CATAPRES  
   
 Your last dose was: Your next dose is: Take  by mouth two (2) times a day. docusate sodium 100 mg capsule Commonly known as:  Roge Parekh Your last dose was: Your next dose is: Take 100 mg by mouth two (2) times a day. 100 mg  
    
   
   
   
  
 isosorbide dinitrate 20 mg tablet Commonly known as:  ISORDIL Your last dose was: Your next dose is: Take 1 Tab by mouth two (2) times a day. 20 mg  
    
   
   
   
  
 MULTIVITAMIN PO Your last dose was: Your next dose is: Take  by mouth. NIFEdipine ER 90 mg ER tablet Commonly known as:  PROCARDIA XL Your last dose was: Your next dose is: Take 60 mg by mouth daily. 60 mg  
    
   
   
   
  
 pantoprazole 40 mg tablet Commonly known as:  PROTONIX Your last dose was: Your next dose is: TAKE 1 TABLET BY MOUTH TWICE DAILY RENVELA 800 mg Tab tab Generic drug:  sevelamer carbonate Your last dose was: Your next dose is: Take  by mouth three (3) times daily. SENSIPAR 30 mg tablet Generic drug:  cinacalcet Your last dose was: Your next dose is: Take 30 mg by mouth daily. 30 mg  
    
   
   
   
  
 timolol 0.5 % ophthalmic solution Commonly known as:  TIMOPTIC Your last dose was: Your next dose is:    
   
   
      
   
   
   
  
  
  
  
Discharge Instructions [address] ARTERIOVENOUS FISTULA, GRAFT ACCESS, REVISION, OR DECLOT 
 
ACTIVITY: 
Limited activity today. Keep access arm straight and raised above the level of your heart for 24 hours or until the swelling goes away. DIET: 
May have your usual food, unless told otherwise by your doctor.  
 
PAIN: 
 Use the prescription for pain medicine your doctor gave you. If you do not have one, usual your normal pain medicine. If this does not control your pain, call your doctor. DO NOT TAKE ASPIRIN OR MEDICINE WITH ASPIRIN IN IT, unless your doctor says you may. DRESSING CARE:  
Keep your dressing clean and dry. Leave your dressing on until the Dialysis Nurse or your doctor takes it off. BLEEDING: If you have any bleeding put pressure over the bleeding area and call your doctor. CARE OF YOUR ACCESS ARM: 
You may have some bruising, swelling, and discoloration for several weeks. After the swelling has gone down, you will be able to see the outline of the graft. By placing your fingertips over the graft you will be able to feel a vibration (thrill) that means blood is flowing and the graft is working. DO: 
1. Make sure your arm is washed with soap and water every day. 2. Feel for the [address] at area marked in the picture. 3. Call your Kidney doctor if you do not feel the [address] or for any problems like swelling, redness, tingling, or numbness in access arm, pus, or fever over 101. DO NOT: 
1. Wear tight sleeves, watches, belts, or bracelets over the graft. 2. Carry heavy bags across the graft or fistula. 3. Sleep on your graft side. 4. Let your blood pressure be taken in your access arm. 5. Let blood be drawn from your access arm. For the next 24 hours, DO NOT Drive, Drink Alcoholic Beverages, or Make IMPORTANT Decisions. Follow-Up Instructions:  Please see your ordering doctor as he/she has requested. DISCHARGE SUMMARY from Nurse PATIENT INSTRUCTIONS: 
 
After general anesthesia or intravenous sedation, for 24 hours or while taking prescription Narcotics: · Limit your activities · Do not drive and operate hazardous machinery · Do not make important personal or business decisions · Do  not drink alcoholic beverages · If you have not urinated within 8 hours after discharge, please contact your surgeon on call. Report the following to your surgeon: 
· Excessive pain, swelling, redness or odor of or around the surgical area · Temperature over 100.5 · Nausea and vomiting lasting longer than 4 hours or if unable to take medications · Any signs of decreased circulation or nerve impairment to extremity: change in color, persistent  numbness, tingling, coldness or increase pain · Any questions What to do at Home: 
 
 
  Please give a list of your current medications to your Primary Care Provider. *  Please update this list whenever your medications are discontinued, doses are 
    changed, or new medications (including over-the-counter products) are added. *  Please carry medication information at all times in case of emergency situations. These are general instructions for a healthy lifestyle: No smoking/ No tobacco products/ Avoid exposure to second hand smoke Surgeon General's Warning:  Quitting smoking now greatly reduces serious risk to your health. Obesity, smoking, and sedentary lifestyle greatly increases your risk for illness A healthy diet, regular physical exercise & weight monitoring are important for maintaining a healthy lifestyle You may be retaining fluid if you have a history of heart failure or if you experience any of the following symptoms:  Weight gain of 3 pounds or more overnight or 5 pounds in a week, increased swelling in our hands or feet or shortness of breath while lying flat in bed. Please call your doctor as soon as you notice any of these symptoms; do not wait until your next office visit. Recognize signs and symptoms of STROKE: 
 
F-face looks uneven A-arms unable to move or move unevenly S-speech slurred or non-existent T-time-call 911 as soon as signs and symptoms begin-DO NOT go Back to bed or wait to see if you get better-TIME IS BRAIN. Warning Signs of HEART ATTACK Call 911 if you have these symptoms: 
? Chest discomfort. Most heart attacks involve discomfort in the center of the chest that lasts more than a few minutes, or that goes away and comes back. It can feel like uncomfortable pressure, squeezing, fullness, or pain. ? Discomfort in other areas of the upper body. Symptoms can include pain or discomfort in one or both arms, the back, neck, jaw, or stomach. ? Shortness of breath with or without chest discomfort. ? Other signs may include breaking out in a cold sweat, nausea, or lightheadedness. Don't wait more than five minutes to call [address]! Fast action can save your life. Calling 911 is almost always the fastest way to get lifesaving treatment. Emergency Medical Services staff can begin treatment when they arrive  up to an hour sooner than if someone gets to the hospital by car. The discharge information has been reviewed with the patient and caregiver. The patient and caregiver verbalized understanding. Discharge medications reviewed with the patient and caregiver and appropriate educational materials and side effects teaching were provided. ___________________________________________________________________________________________________________________________________ ACO Transitions of Care Introducing [facility] [address] Shobha Worley offers a voluntary care coordination program to provide high quality service and care to [facility] fee-for-service beneficiaries. [facility] was designed to help you enhance your health and well-being through the following services: ? Transitions of Care  support for individuals who are transitioning from one care setting to another (example: Hospital to home). ?  Chronic and Complex Care Coordination  support for individuals and caregivers of those with serious or chronic illnesses or with more than one chronic (ongoing) condition and those who take a number of different medications. If you meet specific medical criteria, a [facility] [address] may call you directly to coordinate your care with your primary care physician and your other care providers. For questions about the [facility] programs, please, contact your physicians office. For general questions or additional information about Accountable Care Organizations: 
Please visit www.medicare.gov/acos. html or call 1-800-MEDICARE (7-719.757.7275) TTY users should call 0-329.157.4695. Introducing [facility]! Willa Gomez introduces iConText patient portal. Now you can access parts of your medical record, email your doctor's office, and request medication refills online. 1. In your internet browser, go to [email or web address] 2. Click on the First Time User? Click Here link in the Sign In box. You will see the New Member Sign Up page. 3. Enter your iConText Access Code exactly as it appears below. You will not need to use this code after youve completed the sign-up process. If you do not sign up before the expiration date, you must request a new code. · iConText Access Code: [email or web address] Expires: 10/15/2018  8:41 AM 
 
4. Enter the last four digits of your Social Security Number (xxxx) and Date of Birth (mm/dd/yyyy) as indicated and click Submit. You will be taken to the next sign-up page. 5. Create a iConText ID. This will be your iConText login ID and cannot be changed, so think of one that is secure and easy to remember. 6. Create a iConText password. You can change your password at any time. 7. Enter your Password Reset Question and Answer. This can be used at a later time if you forget your password. 8. Enter your e-mail address. You will receive e-mail notification when new information is available in [address] 9. Click Sign Up. You can now view and download portions of your medical record. 10. Click the Download Summary menu link to download a portable copy of your medical information. If you have questions, please visit the Frequently Asked Questions section of the [employer] website. Remember, [employer] is NOT to be used for urgent needs. For medical emergencies, dial 911. Now available from your iPhone and Android! Introducing Pedro Macdonald As a [facility] patient, I wanted to make you aware of our electronic visit tool called Pedro Macdonald. [employer] 24/7 allows you to connect within minutes with a medical provider 24 hours a day, seven days a week via a mobile device or tablet or logging into a secure website from your computer. You can access Pedro Macdonald from anywhere in the [place]. A virtual visit might be right for you when you have a simple condition and feel like you just dont want to get out of bed, or cant get away from work for an appointment, when your regular [facility] provider is not available (evenings, weekends or holidays), or when youre out of town and need minor care. Electronic visits cost only $49 and if the [facility] 24/7 provider determines a prescription is needed to treat your condition, one can be electronically transmitted to a nearby pharmacy*. Please take a moment to enroll today if you have not already done so. The enrollment process is free and takes just a few minutes. To enroll, please download the [employer] 24/7 niles to your tablet or phone, or visit [email or web address] to enroll on your computer. And, as an [address] [place] [address] patient with a [facility] account, the results of your visits will be scanned into your electronic medical record and your primary care provider will be able to view the scanned results.    
We urge you to continue to see your regular [facility] provider for your ongoing medical care. And while your primary care provider may not be the one available when you seek a Pedro Macdonald virtual visit, the peace of mind you get from getting a real diagnosis real time can be priceless. For more information on Pedro Macdonald, view our Frequently Asked Questions (FAQs) at [email or web address]. Sincerely, 
 
Lovely Lira MD 
Chief Medical Officer Deborah Worley *:  certain medications cannot be prescribed via Pedro Macdonald Providers Seen During Your Hospitalization Provider Specialty Primary office phone Gary Bowles MD Vascular Surgery [phone] Your Primary Care Physician (PCP) Primary Care Physician Office Phone Office Fax Deatra Sanger [phone] You are allergic to the following No active allergies Recent Documentation Height Weight BMI Smoking Status 1.829 m 77.1 kg 23.06 kg/m2 Never Smoker Emergency Contacts Name Discharge Info Relation Home Work Mobile BinghamDenita DISCHARGE CAREGIVER [3] Spouse [3] [phone] Patient Belongings The following personal items are in your possession at time of discharge: 
  Dental Appliances: None  Visual Aid: None      Home Medications: None   Jewelry: None  Clothing: At bedside    Other Valuables: None Please provide this summary of care documentation to your next provider. Signatures-by signing, you are acknowledging that this After Visit Summary has been reviewed with you and you have received a copy. Patient Signature:  ____________________________________________________________ Date:  ____________________________________________________________  
  
Jese Aviles Provider Signature:  ____________________________________________________________ Date:  ____________________________________________________________

## 2018-08-01 NOTE — PROGRESS NOTES
Patient arrived to cath holding awake and alert, vital signs stable, left upper arm site clean dry and intact with no hematoma present, no C/O pain. 1415 Patient discharged home, vital signs stable, left upper arm site clean dry and intact with no hematoma present, no C/O pain.

## 2018-08-01 NOTE — Clinical Note
TRANSFER - OUT REPORT:  
 
Verbal report given to: Lou CEDILLO. Report consisted of patient's Situation, Background, Assessment and  
Recommendations(SBAR). Opportunity for questions and clarification was provided. Patient transported with a Cardiac Cath Tech / Patient Care Tech. Patient transported to: [address]

## 2018-08-01 NOTE — ANESTHESIA PREPROCEDURE EVALUATION
Anesthetic History No history of anesthetic complications Review of Systems / Medical History Patient summary reviewed and pertinent labs reviewed Pulmonary Sleep apnea: No treatment Neuro/Psych Within defined limits Cardiovascular Hypertension: poorly controlled Exercise tolerance: <4 METS 
  
GI/Hepatic/Renal 
  
GERD: well controlled Renal disease: ESRD and dialysis Endo/Other Diabetes: poorly controlled, type 2 Hypothyroidism: well controlled Arthritis Other Findings Comments: Documentation of current medication Current medications obtained, documented and obtained? YES Risk Factors for Postoperative nausea/vomiting: 
     History of postoperative nausea/vomiting? NO Female? NO Motion sickness? NO Intended opioid administration for postoperative analgesia? YES Smoking Abstinence: 
Current Smoker? NO Elective Surgery? YES Seen preoperatively by anesthesiologist or proxy prior to day of surgery? YES Pt abstained from smoking 24 hours prior to anesthesia? N/A Preventive care/screening for High Blood Pressure: 
Aged 18 years and older: YES Screened for high blood pressure: YES Patients with high blood pressure referred to primary care provider 
 for BP management: YES Physical Exam 
 
Airway Mallampati: III 
TM Distance: 4 - 6 cm Neck ROM: decreased range of motion Mouth opening: Diminished (comment) Cardiovascular Rhythm: regular Rate: normal 
 
 
 
 Dental 
 
Dentition: Poor dentition Pulmonary Breath sounds clear to auscultation Abdominal 
GI exam deferred Other Findings Anesthetic Plan ASA: 3 Anesthesia type: MAC Induction: Intravenous Anesthetic plan and risks discussed with: Patient

## 2018-08-02 ENCOUNTER — PATIENT OUTREACH (OUTPATIENT)
Dept: INTERNAL MEDICINE CLINIC | Age: 63
End: 2018-08-02

## 2018-08-02 NOTE — OP NOTES
[address] [place] [address] Dr  OPERATIVE REPORT    Bernis Spatz  MR#: [id number]  : 1955  ACCOUNT #: [de-identified]   DATE OF SERVICE: 2018    SURGEON:  Caitlin Ordoñez MD    ASSISTANT:      PREOPERATIVE DIAGNOSES:    1.  End-stage renal disease. 2.  Dysfunctional left arm graft. POSTOPERATIVE DIAGNOSES:    1.  End-stage renal disease. 2.  Dysfunctional left arm graft. PROCEDURE PERFORMED:  Left arm shuntogram with radiographic interpretation, reflux arteriography with radiographic interpretation, balloon angioplasty of graft vein anastomosis with associated intraoperative interpretation. COMPLICATIONS:  Zero. ESTIMATED BLOOD LOSS:  Zero. CONDITION:  The patient is stable. SPECIMEN REMOVED:  None. IMPLANTS:  None. ANESTHESIA:  Local.    DESCRIPTION OF PROCEDURE:  The patient is a 58-year-old who has outflow pressures here today for a shuntogram for intervention. He was positioned into the supine position on the table in the Cath Lab. He had local for anesthesia. We prepped the left arm. He has a pulsatile graft localized to the anterior needlestick to the arterial portion, compressed the graft, did a reflux arteriogram, looked in a couple of views. The artery and its anastomosis to the graft are patent. The proximal graft was patent. The mid graft was patent and the outflow has a shelf-like lesion onto the vein. The vein was patent all the way to the central vein. Did a balloon angioplasty of this lesion of the antecubital fossa with a 840 balloon to full profile. This changed the runoff significantly. Now there was rapid flow into the vein seen on angioplasty pictures. There was 100% resolution and no extravasation. I was pleased with this. I removed the sheath, repaired the site with a 3-0 Ethilon. There was a continuous thrill throughout the graft.       DO YASMANI Jin /   D: 2018 13:51     T: 2018 18:46  JOB #: [id number]

## 2018-08-02 NOTE — PROGRESS NOTES
Hospital Discharge Follow-Up Date/Time:  2018 10:10 AM 
 
[facility] and discharged on 2018 for scheduled left arm fistulogram. The physician discharge summary was not available at the time of outreach. Patient was contacted within 1 business days of discharge. Top Challenges reviewed with the provider None Method of communication with provider :none Inpatient RRAT score: N/A Was this a readmission? no  
Patient stated reason for the readmission: N/A Nurse Navigator (NN) contacted the patient by telephone to perform post hospital discharge assessment. Verified name and  with patient as identifiers. Provided introduction to self, and explanation of the Nurse Navigator role. Patient denies: 
Pain Bleeding Swelling Bruising Fever Chills Nausea Vomiting Dizziness Lightheadedness Numbness Tingling Decrease ROM Patient reports: 
Dressing clean, dry, and intact Hemodialysis on MWF Able to feel the thrill Confirmed follow up appointment with Vascular Reviewed discharge instructions and red flags with patient who verbalized understanding. Patient given an opportunity to ask questions and does not have any further questions or concerns at this time. The patient agrees to contact the PCP office for questions related to their healthcare. NN provided contact information for future reference. Disease Specific:   N/A Barriers to care? None Advance Care Planning:  
Does patient have an Advance Directive:  not on file Medication(s):  
New Medications at Discharge: None Changed Medications at Discharge: None Discontinued Medications at Discharge: None Current Outpatient Prescriptions Medication Sig  pantoprazole (PROTONIX) 40 mg tablet TAKE 1 TABLET BY MOUTH TWICE DAILY  isosorbide dinitrate (ISORDIL) 20 mg tablet Take 1 Tab by mouth two (2) times a day.   
 cloNIDine HCl (CATAPRES) 0.1 mg tablet Take  by mouth two (2) times a day.  docusate sodium (COLACE) 100 mg capsule Take 100 mg by mouth two (2) times a day.  cinnamon bark (CINNAMON) 500 mg cap 500 mg.  timolol (TIMOPTIC) 0.5 % ophthalmic solution  cinacalcet (SENSIPAR) 30 mg tablet Take 30 mg by mouth daily.  aspirin delayed-release 81 mg tablet Take  by mouth daily.  NIFEdipine XL (PROCARDIA-XL) 90 mg tablet Take 60 mg by mouth daily.  sevelamer carbonate (RENVELA) 800 mg Tab tab Take  by mouth three (3) times daily.  carvedilol (COREG) 25 mg tablet Take 25 mg by mouth two (2) times a day.  MULTIVITAMIN PO Take  by mouth. No current facility-administered medications for this visit. There are no discontinued medications. [facility] follow up appointment(s): Future Appointments Date Time Provider Tamir Santillan 8/16/2018 9:00 AM WANDA Rojas [facility] CHRISTY SCHED  
9/20/2018 8:15 AM [facility] NURSE VISIT [facility]STY SCHED  
9/28/2018 1:45 PM Heber Jovel MD [address] Goals Post Hospitalization  Attends follow-up appointments as directed. Follow up with Vascular, 8/16/2018 8/2/2018: appointment confirmed with patient  Prevent complications post hospitalization. No admissions within 30 days post discharge, 8/1/2018

## 2018-08-16 ENCOUNTER — PATIENT OUTREACH (OUTPATIENT)
Dept: INTERNAL MEDICINE CLINIC | Age: 63
End: 2018-08-16

## 2018-08-16 NOTE — PROGRESS NOTES
Goals Addressed        Post Hospitalization     Attends follow-up appointments as directed.                   Follow up with Vascular, 8/16/2018 8/2/2018: appointment confirmed with patient  8/16/2018: Vascular appointment rescheduled for 8/22/2018 at 1

## 2018-08-22 ENCOUNTER — OFFICE VISIT (OUTPATIENT)
Dept: VASCULAR SURGERY | Age: 63
End: 2018-08-22

## 2018-08-22 VITALS
DIASTOLIC BLOOD PRESSURE: 66 MMHG | SYSTOLIC BLOOD PRESSURE: 140 MMHG | HEART RATE: 88 BPM | BODY MASS INDEX: 23.03 KG/M2 | HEIGHT: 72 IN | RESPIRATION RATE: 16 BRPM | WEIGHT: 170 LBS

## 2018-08-22 DIAGNOSIS — N18.6 ESRD (END STAGE RENAL DISEASE) (HCC): Primary | ICD-10-CM

## 2018-08-22 NOTE — MR AVS SNAPSHOT
[address] [id number] [address] [facility] [address] 
[phone] Patient: Maryellen Villalobos MRN: [id number]:[id number] Visit Information Date & Time Provider Department Dept. Phone Encounter #  
 8/22/2018  3:15 PM Caro Pineda [address] [facility] [phone] Follow-up Instructions Return if symptoms worsen or fail to improve. Your Appointments 8/22/2018  3:15 PM  
HOSPITAL DISCHARGE with [facility] ([address] [place] [address]) Appt Note: DC lt arm fistulagram; patient can only come m, w, f due to transportation 23[address] [facility] 462 [address] [facility] [address]  
[phone] [address] [facility] 47 [facility]  
  
    
 9/20/2018  8:15 AM  
LAB with [facility] NURSE VISIT [facility] ([address] [place] [address]) Appt Note: labs [address], Suite [facility] [address] [place]  
  
   
 [address], [address]  
  
    
 9/28/2018  1:45 PM  
Office Visit with Shavon Evans MD  
[facility] [address] [place] [address]) Appt Note: 6 mo f/u  
 [address] [facility] [address], [address] [place] [address] [address] [place]  
  
   
 [address], [address] Upcoming Health Maintenance Date Due Influenza Age 5 to Adult 8/1/2018 HEMOGLOBIN A1C Q6M 9/15/2018 EYE EXAM RETINAL OR DILATED Q1 3/21/2019* LIPID PANEL Q1 3/15/2019 FOOT EXAM Q1 3/23/2019 MEDICARE YEARLY EXAM 3/24/2019 COLONOSCOPY 4/10/2023 DTaP/Tdap/Td series (2 - Td) 9/24/2025 *Topic was postponed. The date shown is not the original due date. Allergies as of 8/22/2018  Review Complete On: 8/22/2018 By: Bayron Yo No Known Allergies Current Immunizations  Reviewed on 3/23/2018 Name Date Influenza High Dose Vaccine PF 10/1/2017 Influenza Vaccine 10/1/2014, 12/15/2013 Influenza Vaccine Whole 2/2/2011 Pneumococcal Conjugate (PCV-13) 9/24/2015 Pneumococcal Polysaccharide (PPSV-23) 12/20/2016, 11/3/2016 TD Vaccine 1/1/2007 ZZZ-RETIRED (DO NOT USE) Pneumococcal Vaccine (Unspecified Type) 2/2/2011 Zoster Vaccine, Live 3/18/2014  8:50 AM  
  
 Not reviewed this visit You Were Diagnosed With   
  
 Codes Comments ESRD (end stage renal disease) ([facility].)    -  Primary ICD-10-CM: N18.6 ICD-9-CM: 359. 6 Vitals BP Pulse Resp Height(growth percentile) Weight(growth percentile) BMI  
 140/66 (BP 1 Location: Right arm, BP Patient Position: Sitting) 88 16 6' (1.829 m) 170 lb (77.1 kg) 23.06 kg/m2 Smoking Status Never Smoker Vitals History BMI and BSA Data Body Mass Index Body Surface Area 23.06 kg/m 2 1.98 m 2 Preferred Pharmacy Pharmacy Name Phone [facility] [address] - [address], [address] [phone] Your Updated Medication List  
  
   
This list is accurate as of 8/22/18  2:59 PM.  Always use your most recent med list.  
  
  
  
  
 aspirin delayed-release 81 mg tablet Take  by mouth daily. carvedilol 25 mg tablet Commonly known as:  Kathleene Cherelle Take 25 mg by mouth two (2) times a day. CINNAMON 500 mg Cap Generic drug:  cinnamon bark 500 mg.  
  
 cloNIDine HCl 0.1 mg tablet Commonly known as:  CATAPRES Take  by mouth two (2) times a day. docusate sodium 100 mg capsule Commonly known as:  Arman Duster Take 100 mg by mouth two (2) times a day. isosorbide dinitrate 20 mg tablet Commonly known as:  ISORDIL Take 1 Tab by mouth two (2) times a day. MULTIVITAMIN PO Take  by mouth. NIFEdipine ER 90 mg ER tablet Commonly known as:  PROCARDIA XL Take 60 mg by mouth daily. pantoprazole 40 mg tablet Commonly known as:  PROTONIX TAKE 1 TABLET BY MOUTH TWICE DAILY RENVELA 800 mg Tab tab Generic drug:  sevelamer carbonate Take  by mouth three (3) times daily. SENSIPAR 30 mg tablet Generic drug:  cinacalcet Take 30 mg by mouth daily. timolol 0.5 % ophthalmic solution Commonly known as:  TIMOPTIC Follow-up Instructions Return if symptoms worsen or fail to improve. Introducing [facility]! [employer] introduces [employer] patient portal. Now you can access parts of your medical record, email your doctor's office, and request medication refills online. 1. In your internet browser, go to [email or web address] 2. Click on the First Time User? Click Here link in the Sign In box. You will see the New Member Sign Up page. 3. Enter your [employer] Access Code exactly as it appears below. You will not need to use this code after youve completed the sign-up process. If you do not sign up before the expiration date, you must request a new code. · [employer] Access Code: [email or web address] Expires: 10/15/2018  8:41 AM 
 
4. Enter the last four digits of your Social Security Number (xxxx) and Date of Birth (mm/dd/yyyy) as indicated and click Submit. You will be taken to the next sign-up page. 5. Create a [employer] ID. This will be your [employer] login ID and cannot be changed, so think of one that is secure and easy to remember. 6. Create a [employer] password. You can change your password at any time. 7. Enter your Password Reset Question and Answer. This can be used at a later time if you forget your password. 8. Enter your e-mail address. You will receive e-mail notification when new information is available in [address] 9. Click Sign Up. You can now view and download portions of your medical record. 10. Click the Download Summary menu link to download a portable copy of your medical information.  
 
If you have questions, please visit the Frequently Asked Questions section of the [employer]. Remember, [employer] is NOT to be used for urgent needs. For medical emergencies, dial 911. Now available from your iPhone and Android! Please provide this summary of care documentation to your next provider. Your primary care clinician is listed as Marjorie Lees. If you have any questions after today's visit, please call [phone].

## 2018-08-22 NOTE — PROGRESS NOTES
1. Have you been to an emergency room or urgent care clinic since your last visit? no    Hospitalized since your last visit? If yes, where, when, and reason for visit? no  2. Have you seen or consulted any other health care providers outside of the [facility] since your last visit including any procedures, health maintenance items.  If yes, where, when and reason for visit? no

## 2018-08-22 NOTE — PROGRESS NOTES
Mr Jerardo Maloney is here following his shuntogram of his left arm  He had developed problems with his access related to outflow stenosis which was confirmed initially by duplex  He did have notable stenosis on the shuntogram which was successfully treated with balloon angioplasty with no residual stenosis  Functioning well for dialysis now, from which he just came  Cannulation suture removed  Above details explained  Will follow up prn for any new issues/concerns regarding function of access

## 2018-08-24 ENCOUNTER — PATIENT OUTREACH (OUTPATIENT)
Dept: INTERNAL MEDICINE CLINIC | Age: 63
End: 2018-08-24

## 2018-08-24 NOTE — PROGRESS NOTES
Goals Addressed             Most Recent       Post Hospitalization     COMPLETED: Attends follow-up appointments as directed. On track (8/24/2018)             Follow up with Vascular, 8/16/2018 8/2/2018: appointment confirmed with patient  8/16/2018: Vascular appointment rescheduled for 8/22/2018 at [phone]  8/24/2018: Patient attended their post discharge follow up appointment with WANDA Bobby  as scheduled.

## 2018-09-04 ENCOUNTER — PATIENT OUTREACH (OUTPATIENT)
Dept: INTERNAL MEDICINE CLINIC | Age: 63
End: 2018-09-04

## 2018-09-04 NOTE — PROGRESS NOTES
Patient has graduated from the Transitions of Care Coordination  program on 9/4/2018. Patient's symptoms are stable at this time. Patient/family has the ability to self-manage. Care management goals have been completed at this time. No further nurse navigator follow up scheduled. Goals Addressed Most Recent Post Hospitalization  COMPLETED: Prevent complications post hospitalization. On track (9/4/2018) No admissions within 30 days post discharge, 8/1/2018 9/4/2018: .Episode closed: no hospitalization or ED admission post 30 days from discharge. Pt has nurse navigator's contact information for any further questions, concerns, or needs. Patients upcoming visits:  Future Appointments Date Time Provider Tamir Santillan 9/20/2018 8:15 AM [facility] NURSE VISIT [facility] CHRISTY MONTGOMERY  
9/28/2018 1:45 PM Patria Saint, MD [facility]

## 2018-09-06 DIAGNOSIS — K21.9 GERD WITHOUT ESOPHAGITIS: ICD-10-CM

## 2018-09-07 RX ORDER — PANTOPRAZOLE SODIUM 40 MG/1
TABLET, DELAYED RELEASE ORAL
Qty: 60 TAB | Refills: 0 | Status: SHIPPED | OUTPATIENT
Start: 2018-09-07 | End: 2018-12-20 | Stop reason: SDUPTHER

## 2018-12-20 DIAGNOSIS — K21.9 GERD WITHOUT ESOPHAGITIS: ICD-10-CM

## 2018-12-21 DIAGNOSIS — K21.9 GERD WITHOUT ESOPHAGITIS: ICD-10-CM

## 2018-12-21 RX ORDER — PANTOPRAZOLE SODIUM 40 MG/1
TABLET, DELAYED RELEASE ORAL
Qty: 180 TAB | Refills: 0 | Status: SHIPPED | OUTPATIENT
Start: 2018-12-21 | End: 2019-06-12 | Stop reason: SDUPTHER

## 2018-12-21 RX ORDER — PANTOPRAZOLE SODIUM 40 MG/1
TABLET, DELAYED RELEASE ORAL
Qty: 60 TAB | Refills: 0 | Status: SHIPPED | OUTPATIENT
Start: 2018-12-21 | End: 2018-12-21 | Stop reason: SDUPTHER

## 2019-02-07 ENCOUNTER — HOSPITAL ENCOUNTER (OUTPATIENT)
Dept: LAB | Age: 64
Discharge: HOME | End: 2019-02-07
Payer: MEDICARE

## 2019-02-07 ENCOUNTER — LAB ONLY (OUTPATIENT)
Dept: INTERNAL MEDICINE CLINIC | Age: 64
End: 2019-02-07

## 2019-02-07 DIAGNOSIS — E11.40 TYPE 2 DIABETES, CONTROLLED, WITH NEUROPATHY (HCC): Primary | ICD-10-CM

## 2019-02-07 DIAGNOSIS — I10 PRIMARY HYPERTENSION: ICD-10-CM

## 2019-02-07 DIAGNOSIS — E11.40 TYPE 2 DIABETES, CONTROLLED, WITH NEUROPATHY (HCC): ICD-10-CM

## 2019-02-07 DIAGNOSIS — E78.5 DYSLIPIDEMIA: ICD-10-CM

## 2019-02-07 LAB
ALBUMIN SERPL-MCNC: 3.4 G/DL (ref 3.4–5)
ALBUMIN/GLOB SERPL: 0.8 {RATIO} (ref 0.8–1.7)
ALP SERPL-CCNC: 82 U/L (ref 45–117)
ALT SERPL-CCNC: 18 U/L (ref 16–61)
ANION GAP SERPL CALC-SCNC: 7 MMOL/L (ref 3–18)
AST SERPL-CCNC: 18 U/L (ref 15–37)
BASOPHILS # BLD: 0.1 K/UL (ref 0–0.1)
BASOPHILS NFR BLD: 1 % (ref 0–2)
BILIRUB SERPL-MCNC: 0.4 MG/DL (ref 0.2–1)
BUN SERPL-MCNC: 36 MG/DL (ref 7–18)
BUN/CREAT SERPL: 4 (ref 12–20)
CALCIUM SERPL-MCNC: 9.1 MG/DL (ref 8.5–10.1)
CHLORIDE SERPL-SCNC: 95 MMOL/L (ref 100–108)
CHOLEST SERPL-MCNC: 216 MG/DL
CO2 SERPL-SCNC: 35 MMOL/L (ref 21–32)
CREAT SERPL-MCNC: 8.04 MG/DL (ref 0.6–1.3)
DIFFERENTIAL METHOD BLD: ABNORMAL
EOSINOPHIL # BLD: 0.1 K/UL (ref 0–0.4)
EOSINOPHIL NFR BLD: 2 % (ref 0–5)
ERYTHROCYTE [DISTWIDTH] IN BLOOD BY AUTOMATED COUNT: 14.1 % (ref 11.6–14.5)
EST. AVERAGE GLUCOSE BLD GHB EST-MCNC: 111 MG/DL
GLOBULIN SER CALC-MCNC: 4.4 G/DL (ref 2–4)
GLUCOSE SERPL-MCNC: 91 MG/DL (ref 74–99)
HBA1C MFR BLD: 5.5 % (ref 4.2–5.6)
HCT VFR BLD AUTO: 39.6 % (ref 36–48)
HDLC SERPL-MCNC: 33 MG/DL (ref 40–60)
HDLC SERPL: 6.5 {RATIO} (ref 0–5)
HGB BLD-MCNC: 12.9 G/DL (ref 13–16)
LDLC SERPL CALC-MCNC: 149.2 MG/DL (ref 0–100)
LIPID PROFILE,FLP: ABNORMAL
LYMPHOCYTES # BLD: 1.2 K/UL (ref 0.9–3.6)
LYMPHOCYTES NFR BLD: 24 % (ref 21–52)
MCH RBC QN AUTO: 31.4 PG (ref 24–34)
MCHC RBC AUTO-ENTMCNC: 32.6 G/DL (ref 31–37)
MCV RBC AUTO: 96.4 FL (ref 74–97)
MONOCYTES # BLD: 0.5 K/UL (ref 0.05–1.2)
MONOCYTES NFR BLD: 11 % (ref 3–10)
NEUTS SEG # BLD: 3.1 K/UL (ref 1.8–8)
NEUTS SEG NFR BLD: 62 % (ref 40–73)
PLATELET # BLD AUTO: 265 K/UL (ref 135–420)
PMV BLD AUTO: 10.4 FL (ref 9.2–11.8)
POTASSIUM SERPL-SCNC: 4.5 MMOL/L (ref 3.5–5.5)
PROT SERPL-MCNC: 7.8 G/DL (ref 6.4–8.2)
RBC # BLD AUTO: 4.11 M/UL (ref 4.7–5.5)
SODIUM SERPL-SCNC: 137 MMOL/L (ref 136–145)
TRIGL SERPL-MCNC: 169 MG/DL (ref ?–150)
VLDLC SERPL CALC-MCNC: 33.8 MG/DL
WBC # BLD AUTO: 4.9 K/UL (ref 4.6–13.2)

## 2019-02-07 PROCEDURE — 80061 LIPID PANEL: CPT

## 2019-02-07 PROCEDURE — 80053 COMPREHEN METABOLIC PANEL: CPT

## 2019-02-07 PROCEDURE — 83036 HEMOGLOBIN GLYCOSYLATED A1C: CPT

## 2019-02-07 PROCEDURE — 85025 COMPLETE CBC W/AUTO DIFF WBC: CPT

## 2019-02-07 PROCEDURE — 36415 COLL VENOUS BLD VENIPUNCTURE: CPT

## 2019-02-10 NOTE — PROGRESS NOTES
70 y.o. BLACK OR  male who presents for f/u evaluation. He has not been doing the bike, nut walks up and down the stairwell sometimes up to 15 min at a time. Denied any cardiovascular complaints Reports some issues with ambulation paroicularly with the left leg Denies any GI or  complaints. The gerd is controlled. Had colo 4/18 He continues on his qMWF hemodialysis with Dr. Miriam Arroyo. Denies polyuria, polydipsia, nocturia, vision change. Weight is stable Vitals 2/15/2019 2/15/2019 8/22/2018 8/1/2018 8/1/2018 Weight 172 lb 9.6 oz  170 lb  170 lb Vitals 4/10/2018 4/3/2018 3/23/2018 Weight 168 lb 168 lb 167 lb LAST MEDICARE WELLNESS EXAM: 9/18/14, 9/24/15, 12/20/16, 3/23/18, 2/15/19 Past Medical History:  
Diagnosis Date  Acute hearing loss of right ear  Acute urinary retention 8/15  Anemia of chronic disease 2010  
 saw Dr. Vipul Wilson in past  
 Asbestosis(501)  Chronic edema  Chronic kidney disease HD- W-W-F  Colon adenoma Dr. Femi Ruby 2009, 2/15, 4/18  Diabetes mellitus (HCC)   
 w neuropathy and retinopathy Dr. Usama Kaminski  DJD (degenerative joint disease)  Dyslipidemia  Erectile dysfunction  ESRD on hemodialysis ([facility] [address] Dr. Miriam Arroyo, M-W-F  FHx: heart disease  GERD (gastroesophageal reflux disease)  Glaucoma Dr. Donald [facility] failure [facility]) 2011  
 HTN (hypertension)  Kidney stone  Sarcoidosis  Sleep apnea NOT using cpap (states resolved)  Thyroid disease Past Surgical History:  
Procedure Laterality Date  COLONOSCOPY N/A 4/10/2018 Dr. Femi Ruby adenoma 2009, 2/15; adenoma and hyperplastic 4/10/18  HX CATARACT REMOVAL    
 HX ORTHOPAEDIC  01/2018  
 left wrist and humerus fx Dr Mally Mckay  HX UROLOGICAL  9/15  
 cystoscopy negative Dr Maryann Aschoff  VT INTRO CATH DIALYSIS CIRCUIT DX ANGRPH FLUOR S&I N/A 8/1/2018 Fistulogram Left performed by Leeann Hernández MD at [facility] LAB  STRESS TEST THALLIUM STUDY  2009  
 negative  VASCULAR SURGERY PROCEDURE UNLIST  3/14  
 nl BALDEMAR bilat  VASCULAR SURGERY PROCEDURE UNLIST    
 LEFT ARM GRAFT FOR HD Social History Socioeconomic History  Marital status:  Spouse name: Not on file  Number of children: 3  
 Years of education: Not on file  Highest education level: Not on file Social Needs  Financial resource strain: Not on file  Food insecurity - worry: Not on file  Food insecurity - inability: Not on file  Transportation needs - medical: Not on file  Transportation needs - non-medical: Not on file Occupational History  Occupation: [employer] Tobacco Use  Smoking status: Never Smoker  Smokeless tobacco: Never Used Substance and Sexual Activity  Alcohol use: No  
 Drug use: No  
 Sexual activity: No  
Other Topics Concern  Not on file Social History Narrative  Not on file No Known Allergies Current Outpatient Medications Medication Sig  DUREZOL 0.05 % ophthalmic emulsion INSTILL 1 DROP LEFT EYE TID AS DIRECTED  AURYXIA 210 mg iron tablet Take 210 mg by mouth three (3) times daily (with meals).  cinacalcet (SENSIPAR) 60 mg tab Take 60 mg by mouth nightly.  pantoprazole (PROTONIX) 40 mg tablet TAKE 1 TABLET BY MOUTH TWICE DAILY  isosorbide dinitrate (ISORDIL) 20 mg tablet Take 1 Tab by mouth two (2) times a day.  cloNIDine HCl (CATAPRES) 0.1 mg tablet Take  by mouth two (2) times a day.  docusate sodium (COLACE) 100 mg capsule Take 100 mg by mouth two (2) times a day.  cinnamon bark (CINNAMON) 500 mg cap 500 mg.  timolol (TIMOPTIC) 0.5 % ophthalmic solution Administer 1 Drop to both eyes two (2) times a day.  aspirin delayed-release 81 mg tablet Take 81 mg by mouth daily.  NIFEdipine XL (PROCARDIA-XL) 90 mg tablet Take 60 mg by mouth daily.  carvedilol (COREG) 25 mg tablet Take 25 mg by mouth two (2) times a day.  MULTIVITAMIN PO Take  by mouth daily. No current facility-administered medications for this visit. REVIEW OF SYSTEMS: colo Dr Pleasant Closs 2/15, sees Dr Alvaro Bush, Dr Carolee Padgett in past 
Ophtho  no vision change or eye pain Oral  no mouth pain, tongue or tooth problems Ears  no hearing loss, ear pain, fullness, no swallowing problems Cardiac  no CP, PND, orthopnea, edema, palpitations or syncope Chest  no breast masses Resp  no wheezing, chronic coughing, dyspnea GI  no heartburn, nausea, vomiting, change in bowel habits, bleeding, hemorrhoids Urinary  no dysuria, hematuria, flank pain, urgency, frequency Genitals  no genital lesions, discharge, masses, ulceration, warts Ortho  no swelling, dec ROM, myalgias Derm  no nail abnormalities, rashes, lesions of note, hair loss Psych  denies any anxiety or depression symptoms, no hallucinations or violent ideation Constitutional  no wt loss, night sweats, unexplained fevers Neuro  no focal weakness, numbness, paresthesias, incoordination, ataxia, involuntary movements Endo - no polyuria, polydipsia, nocturia, hot flashes Visit Vitals /88 (BP 1 Location: Right arm, BP Patient Position: Sitting) Pulse 79 Temp 97.3 °F (36.3 °C) (Oral) Resp 12 Ht 6' (1.829 m) Wt 172 lb 9.6 oz (78.3 kg) SpO2 98% BMI 23.41 kg/m² A&O x3 Affect is appropriate. Mood stable No apparent distress Anicteric, no JVD, adenopathy or thyromegaly. No carotid bruits or radiated murmur Lungs clear to auscultation, no wheezes or rales Heart --Regular rate and rhythm, 2/6 ERNESTINA lsb without radiation, no rubs, gallops, or clicks. Abdomen -- Soft and nontender, no hepatosplenomegaly or masses. Extremities -- Without cyanosis, clubbing. 2+ pulses equally and bilaterally. No edema noted, LUE AV fistula w thrill and bruit Pulses 1-2+ DP and PT 
 
LABS From 2/11 showed   gluc 131, cr 4.5,             hba1c 6.1,                   chol 187, tg 154, hdl 25, ldl-c 131, wbc 9.8,  hb 8.7,   plt 374, psa 1.00 From 11/11 showed gluc 114,              hba1c 5.6,             chol 157, tg 157, hdl 36, ldl-c 90 From 5/12 showed     alt 13, hba1c 6.1, ldl-p 1661, chol 184, tg 192, hdl 27, ldl-c 119 From 11/12 showed gluc 130, cr 6.0, gfr 11, alt 9,   hba1c 5.4, ldl-p 884,            wbc 8.0,  hb 10.3  plt 324 From 8/13 showed   gluc 144, cr 8.3,  alt<5,  hba1c 5.7,             chol 169, tg 327, hdl 20, ldl-c 84 From 3/14 showed   gluc 125, cr 6.9,  alt<5,  hba1c 5.8,             chol 141, tg 233, hdl 24, ldl-c 70,  wbc 5.7,   hb 10.4, plt 239 From 9/14 showed                hba1c 5.7,                        psa 1.60, vit d 32.8 From 8/15 showed   gluc 151,   alt 14,               wbc 11.9, hb 11.5, plt 189, lip 283 From 9/15 showed   gluc 110, cr 6.51, gfr 9, alt<5,  hba1c 5.6,             chol 182, tg 219, hdl 26, ldl-c 112, wbc 4.5,   hb 11.7, plt 245 From 2/16 showed                hba1c 5.6,             chol 183, tg 169, hdl 34, ldl-c 115 Form 6/16 showed   gluc 124, cr 8.22,             hba1c 6.0,             chol 140, tg 184, hdl 30, ldl-c 73 From 12/16 showed               alt 15, hba1c 4.8,             chol 138, tg 138, hdl 33, ldl-c 77 From 3/18 showed   gluc 103, alt 13,            hba1c 4.6,                    chol 150, tg 108, hdl 35, ldl-c 93 Results for orders placed or performed during the hospital encounter of 02/07/19 METABOLIC PANEL, COMPREHENSIVE Result Value Ref Range Sodium 137 136 - 145 mmol/L Potassium 4.5 3.5 - 5.5 mmol/L Chloride 95 (L) 100 - 108 mmol/L  
 CO2 35 (H) 21 - 32 mmol/L Anion gap 7 3.0 - 18 mmol/L Glucose 91 74 - 99 mg/dL BUN 36 (H) 7.0 - 18 MG/DL Creatinine 8.04 (H) 0.6 - 1.3 MG/DL  
 BUN/Creatinine ratio 4 (L) 12 - 20 GFR est AA 8 (L) >60 ml/min/1.73m2 GFR est non-AA 7 (L) >60 ml/min/1.73m2 Calcium 9.1 8.5 - 10.1 MG/DL Bilirubin, total 0.4 0.2 - 1.0 MG/DL  
 ALT (SGPT) 18 16 - 61 U/L  
 AST (SGOT) 18 15 - 37 U/L Alk. phosphatase 82 45 - 117 U/L Protein, total 7.8 6.4 - 8.2 g/dL Albumin 3.4 3.4 - 5.0 g/dL Globulin 4.4 (H) 2.0 - 4.0 g/dL A-G Ratio 0.8 0.8 - 1.7 LIPID PANEL Result Value Ref Range LIPID PROFILE Cholesterol, total 216 (H) <200 MG/DL Triglyceride 169 (H) <150 MG/DL  
 HDL Cholesterol 33 (L) 40 - 60 MG/DL  
 LDL, calculated 149.2 (H) 0 - 100 MG/DL VLDL, calculated 33.8 MG/DL  
 CHOL/HDL Ratio 6.5 (H) 0 - 5.0 HEMOGLOBIN A1C WITH EAG Result Value Ref Range Hemoglobin A1c 5.5 4.2 - 5.6 % Est. average glucose 111 mg/dL CBC WITH AUTOMATED DIFF Result Value Ref Range WBC 4.9 4.6 - 13.2 K/uL  
 RBC 4.11 (L) 4.70 - 5.50 M/uL  
 HGB 12.9 (L) 13.0 - 16.0 g/dL HCT 39.6 36.0 - 48.0 % MCV 96.4 74.0 - 97.0 FL  
 MCH 31.4 24.0 - 34.0 PG  
 MCHC 32.6 31.0 - 37.0 g/dL  
 RDW 14.1 11.6 - 14.5 % PLATELET 695 952 - 975 K/uL MPV 10.4 9.2 - 11.8 FL  
 NEUTROPHILS 62 40 - 73 % LYMPHOCYTES 24 21 - 52 % MONOCYTES 11 (H) 3 - 10 % EOSINOPHILS 2 0 - 5 % BASOPHILS 1 0 - 2 %  
 ABS. NEUTROPHILS 3.1 1.8 - 8.0 K/UL  
 ABS. LYMPHOCYTES 1.2 0.9 - 3.6 K/UL  
 ABS. MONOCYTES 0.5 0.05 - 1.2 K/UL  
 ABS. EOSINOPHILS 0.1 0.0 - 0.4 K/UL  
 ABS. BASOPHILS 0.1 0.0 - 0.1 K/UL  
 DF AUTOMATED Patient Active Problem List  
Diagnosis Code  Sleep apnea Dr. Melanie Coy G47.30  Colon adenoma 2/15 Dr. Russel Olivera D12.6  Erectile dysfunction N52.9  Sarcoidosis D86.9  Dyslipidemia E78.5  Primary hypertension I10  
 GERD without esophagitis K21.9  Arthritis, degenerative M19.90  End stage renal disease on dialysis (HCC) N18.6, Z99.2  Advance directive discussed with patient Z70.80  
 Diabetic retinopathy ([facility] [address] E11.319  Type 2 diabetes, controlled, with neuropathy ([facility] [address] Dr Caroline Jo E11.40  [facility]) [facility] N18.6 ASSESSMENT AND PLAN: 
1. DM.  Well-controlled, f/u Dr. Shanice Novak and Dr Landis Lombard 
2. Hypertension. Continue current 3. Hyperlipidemia. He's not sure that he's taking the statin, will check 4. ESRD. F/U Dr. Sharron Fowler 5. Colon polyps. Fiber, colo 2023 6. Asbestosis. Routine f/u from work 7. Ortho. F/U Dr Lisette Cody 8. Sleep apnea. Off cpap. F/U Dr. Kwesi Lisa as needed 9. Ophtho. F/U Dr. Latasha Peterson 10. GERD. ppi and avoidance measures RTC 2/20 Above conditions discussed at length and patient vocalized understanding. All questions answered to patient satisfaction

## 2019-02-15 ENCOUNTER — OFFICE VISIT (OUTPATIENT)
Dept: INTERNAL MEDICINE CLINIC | Age: 64
End: 2019-02-15

## 2019-02-15 DIAGNOSIS — Z13.31 SCREENING FOR DEPRESSION: ICD-10-CM

## 2019-02-15 DIAGNOSIS — N18.6 ESRD (END STAGE RENAL DISEASE) (HCC): ICD-10-CM

## 2019-02-15 DIAGNOSIS — E78.5 DYSLIPIDEMIA: ICD-10-CM

## 2019-02-15 DIAGNOSIS — Z12.11 SCREEN FOR COLON CANCER: ICD-10-CM

## 2019-02-15 DIAGNOSIS — K21.9 GERD WITHOUT ESOPHAGITIS: ICD-10-CM

## 2019-02-15 DIAGNOSIS — G47.33 OBSTRUCTIVE SLEEP APNEA SYNDROME: ICD-10-CM

## 2019-02-15 DIAGNOSIS — Z00.00 MEDICARE ANNUAL WELLNESS VISIT, SUBSEQUENT: Primary | ICD-10-CM

## 2019-02-15 DIAGNOSIS — Z13.39 SCREENING FOR ALCOHOLISM: ICD-10-CM

## 2019-02-15 DIAGNOSIS — Z99.2 END STAGE RENAL DISEASE ON DIALYSIS (HCC): ICD-10-CM

## 2019-02-15 DIAGNOSIS — I10 PRIMARY HYPERTENSION: ICD-10-CM

## 2019-02-15 DIAGNOSIS — Z71.89 ADVANCED DIRECTIVES, COUNSELING/DISCUSSION: ICD-10-CM

## 2019-02-15 DIAGNOSIS — E11.40 TYPE 2 DIABETES, CONTROLLED, WITH NEUROPATHY (HCC): ICD-10-CM

## 2019-02-15 DIAGNOSIS — M19.90 OSTEOARTHRITIS, UNSPECIFIED OSTEOARTHRITIS TYPE, UNSPECIFIED SITE: ICD-10-CM

## 2019-02-15 DIAGNOSIS — Z13.6 SCREENING FOR ISCHEMIC HEART DISEASE: ICD-10-CM

## 2019-02-15 DIAGNOSIS — N18.6 END STAGE RENAL DISEASE ON DIALYSIS (HCC): ICD-10-CM

## 2019-02-15 DIAGNOSIS — E11.319 DIABETIC RETINOPATHY ASSOCIATED WITH TYPE 2 DIABETES MELLITUS, MACULAR EDEMA PRESENCE UNSPECIFIED, UNSPECIFIED LATERALITY, UNSPECIFIED RETINOPATHY SEVERITY (HCC): ICD-10-CM

## 2019-02-15 DIAGNOSIS — D12.6 COLON ADENOMA: ICD-10-CM

## 2019-02-15 RX ORDER — CINACALCET 60 MG/1
60 TABLET, FILM COATED ORAL
COMMUNITY

## 2019-02-15 RX ORDER — FERRIC CITRATE 210 MG/1
210 TABLET, COATED ORAL
COMMUNITY
Start: 2019-02-04 | End: 2020-02-23

## 2019-02-15 RX ORDER — DUREZOL 0.5 MG/ML
EMULSION OPHTHALMIC DAILY
Refills: 3 | COMMUNITY
Start: 2019-02-13

## 2019-02-15 NOTE — PROGRESS NOTES
Chief Complaint Patient presents with  Diabetes  
  follow up  Hypertension  
  follow up  Cholesterol Problem  
  follow up  Labs  
  done 02-07-19 1. Have you been to the ER, urgent care clinic since your last visit? Hospitalized since your last visit? No 
 
2. Have you seen or consulted any other health care providers outside of the [address] [place] [address] since your last visit? Include any pap smears or colon screening. No 
 
Patient was given a copy of the Advanced Directive and understands to bring it in once completed. Health Maintenance Due Topic Date Due  Shingrix Vaccine Age 50> (1 of 2) 11/19/2003

## 2019-02-15 NOTE — PATIENT INSTRUCTIONS
Patient was given a copy of the Advanced Medical Directive Form, and understands to bring it in once completed. Health Maintenance Due Topic Date Due  Shingrix Vaccine Age 50> (1 of 2) 11/19/2003 Medicare Wellness Visit, Male The best way to live healthy is to have a lifestyle where you eat a well-balanced diet, exercise regularly, limit alcohol use, and quit all forms of tobacco/nicotine, if applicable. Regular preventive services are another way to keep healthy. Preventive services (vaccines, screening tests, monitoring & exams) can help personalize your care plan, which helps you manage your own care. Screening tests can find health problems at the earliest stages, when they are easiest to treat. [address] [facility] follows the current, evidence-based guidelines published by the [facility] Kyle Mendez ([facility]) when recommending preventive services for our patients. Because we follow these guidelines, sometimes recommendations change over time as research supports it. (For example, a prostate screening blood test is no longer routinely recommended for men with no symptoms.) Of course, you and your doctor may decide to screen more often for some diseases, based on your risk and co-morbidities (chronic disease you are already diagnosed with). Preventive services for you include: - Medicare offers their members a free annual wellness visit, which is time for you and your primary care provider to discuss and plan for your preventive service needs. Take advantage of this benefit every year! 
-All adults over age 72 should receive the recommended pneumonia vaccines. Current USPSTF guidelines recommend a series of two vaccines for the best pneumonia protection.  
-All adults should have a flu vaccine yearly and an ECG. All adults age 61 and older should receive a shingles vaccine once in their lifetime. -All adults age 38-68 who are overweight should have a diabetes screening test once every three years.  
-Other screening tests & preventive services for persons with diabetes include: an eye exam to screen for diabetic retinopathy, a kidney function test, a foot exam, and stricter control over your cholesterol.  
-Cardiovascular screening for adults with routine risk involves an electrocardiogram (ECG) at intervals determined by the provider.  
-Colorectal cancer screening should be done for adults age 54-65 with no increased risk factors for colorectal cancer. There are a number of acceptable methods of screening for this type of cancer. Each test has its own benefits and drawbacks. Discuss with your provider what is most appropriate for you during your annual wellness visit. The different tests include: colonoscopy (considered the best screening method), a fecal occult blood test, a fecal DNA test, and sigmoidoscopy. 
-All adults born between [facility] should be screened once for Hepatitis C. 
-An Abdominal Aortic Aneurysm (AAA) Screening is recommended for men age 73-68 who has ever smoked in their lifetime. Here is a list of your current Health Maintenance items (your personalized list of preventive services) with a due date: 
Health Maintenance Due Topic Date Due  Shingles Vaccine (1 of 2) 11/19/2003

## 2019-02-17 VITALS
RESPIRATION RATE: 12 BRPM | WEIGHT: 172.6 LBS | SYSTOLIC BLOOD PRESSURE: 149 MMHG | HEART RATE: 79 BPM | DIASTOLIC BLOOD PRESSURE: 88 MMHG | TEMPERATURE: 97.3 F | OXYGEN SATURATION: 98 % | HEIGHT: 72 IN | BODY MASS INDEX: 23.38 KG/M2

## 2019-02-17 PROBLEM — E11.21 TYPE 2 DIABETES WITH NEPHROPATHY (HCC): Status: ACTIVE | Noted: 2019-02-17

## 2019-02-17 RX ORDER — ATORVASTATIN CALCIUM 40 MG/1
40 TABLET, FILM COATED ORAL DAILY
Qty: 90 TAB | Refills: 3 | Status: SHIPPED | OUTPATIENT
Start: 2019-02-17 | End: 2020-03-13 | Stop reason: SDUPTHER

## 2019-02-17 NOTE — ACP (ADVANCE CARE PLANNING)
Advance Care Planning    Advance Care Planning (ACP) Provider Note - Comprehensive     Date of ACP Conversation: 02/17/19  Persons included in Conversation:  patient  Length of ACP Conversation in minutes:  16 minutes    Authorized Decision Maker (if patient is incapable of making informed decisions): This person is: wife  Other Legally Authorized Decision Maker (e.g. Next of Kin)          General ACP for ALL Patients with Decision Making Capacity:   Importance of advance care planning, including choosing a healthcare agent to communicate patient's healthcare decisions if patient lost the ability to make decisions, such as after a sudden illness or accident  Understanding of the healthcare agent role was assessed and information provided  Exploration of values, goals, and preferences if recovery is not expected, even with continued medical treatment in the event of: Imminent death  Severe, permanent brain injury    Review of Existing Advance Directive:  na    For Serious or Chronic Illness:  Understanding of medical condition    Understanding of CPR, goals and expected outcomes, benefits and burdens discussed.     Interventions Provided:  Recommended completion of Advance Directive form after review of ACP materials and conversation with prospective healthcare agent   Recommended communicating the plan and making copies for the healthcare agent, personal physician, and others as appropriate (e.g., health system)  Recommended review of completed ACP document annually or upon change in health status

## 2019-02-17 NOTE — PROGRESS NOTES
This is a Subsequent [facility] Wellness Visit I have reviewed the patient's medical history in detail and updated the computerized patient record. History Past Medical History:  
Diagnosis Date  Acute hearing loss of right ear  Acute urinary retention 8/15  Anemia of chronic disease 2010  
 saw Dr. Tangela Nina in past  
 Asbestosis(501)  Chronic edema  Chronic kidney disease HD- W-W-F  Colon adenoma Dr. Sola Pearce 2009, 2/15, 4/18  Diabetes mellitus (HCC)   
 w neuropathy and retinopathy Dr. Aubree Christina  DJD (degenerative joint disease)  Dyslipidemia  Erectile dysfunction  ESRD on hemodialysis ([facility] [address] Dr. Bella Conway, M-W-F  FHx: heart disease  GERD (gastroesophageal reflux disease)  Glaucoma Dr. Juan A Chandra failure [facility]) 2011  
 HTN (hypertension)  Kidney stone  Sarcoidosis  Sleep apnea NOT using cpap (states resolved)  Thyroid disease Past Surgical History:  
Procedure Laterality Date  COLONOSCOPY N/A 4/10/2018 Dr. Sola Pearce adenoma 2009, 2/15; adenoma and hyperplastic 4/10/18  HX CATARACT REMOVAL    
 HX ORTHOPAEDIC  01/2018  
 left wrist and humerus fx Dr Emory Campbell  HX UROLOGICAL  9/15  
 cystoscopy negative Dr Adalid Lozada  MS INTRO CATH DIALYSIS CIRCUIT DX ANGRPH FLUOR S&I N/A 8/1/2018 Fistulogram Left performed by Reuben Pereyra MD at [address] [place] [address] LAB  STRESS TEST THALLIUM STUDY  2009  
 negative  VASCULAR SURGERY PROCEDURE UNLIST  3/14  
 nl BALDEMAR bilat  VASCULAR SURGERY PROCEDURE UNLIST    
 LEFT ARM GRAFT FOR HD  
 
Current Outpatient Medications Medication Sig Dispense Refill  DUREZOL 0.05 % ophthalmic emulsion INSTILL 1 DROP LEFT EYE TID AS DIRECTED  3  
 AURYXIA 210 mg iron tablet Take 210 mg by mouth three (3) times daily (with meals).  cinacalcet (SENSIPAR) 60 mg tab Take 60 mg by mouth nightly.  pantoprazole (PROTONIX) 40 mg tablet TAKE 1 TABLET BY MOUTH TWICE DAILY 180 Tab 0  
 isosorbide dinitrate (ISORDIL) 20 mg tablet Take 1 Tab by mouth two (2) times a day. 180 Tab 3  cloNIDine HCl (CATAPRES) 0.1 mg tablet Take  by mouth two (2) times a day.  docusate sodium (COLACE) 100 mg capsule Take 100 mg by mouth two (2) times a day.  cinnamon bark (CINNAMON) 500 mg cap 500 mg.  timolol (TIMOPTIC) 0.5 % ophthalmic solution Administer 1 Drop to both eyes two (2) times a day. 3  
 aspirin delayed-release 81 mg tablet Take 81 mg by mouth daily.  NIFEdipine XL (PROCARDIA-XL) 90 mg tablet Take 60 mg by mouth daily.  carvedilol (COREG) 25 mg tablet Take 25 mg by mouth two (2) times a day.  MULTIVITAMIN PO Take  by mouth daily. No Known Allergies Family History Problem Relation Age of Onset  Diabetes Father  Heart Disease Sister  Diabetes Brother  Heart Disease Brother  Diabetes Brother  Diabetes Sister Social History Tobacco Use  Smoking status: Never Smoker  Smokeless tobacco: Never Used Substance Use Topics  Alcohol use: No  
 
Depression Risk Factor Screening:  
 
3 most recent PHQ Screens 2/15/2019 Little interest or pleasure in doing things Not at all Feeling down, depressed, irritable, or hopeless Not at all Total Score PHQ 2 0 SCREENINGS Colonoscopy last done 4/18 Dr Aidee Mueller Immunization History Administered Date(s) Administered  Influenza High Dose Vaccine PF 10/01/2017  Influenza Vaccine 12/15/2013, 10/01/2014, 12/31/2018  Influenza Vaccine Whole 02/02/2011  Pneumococcal Conjugate (PCV-13) 09/24/2015  Pneumococcal Polysaccharide (PPSV-23) 11/03/2016, 12/20/2016  TD Vaccine 01/01/2007  ZZZ-RETIRED (DO NOT USE) Pneumococcal Vaccine (Unspecified Type) 02/02/2011  Zoster Vaccine, Live 03/18/2014 Alcohol Risk Factor Screening: On any occasion during the past 3 months, have you had more than 4 drinks containing alcohol? No 
 
Do you average more than 14 drinks per week? No 
 
 
Functional Ability and Level of Safety:  
 
Hearing Loss  
mild-to-moderate, he has seen Dr Bayron Ziegler in past 
 
Vision - no acute complaints and is followed by Dr Fernando Freeman Activities of Daily Living Self-care. Requires assistance with: no ADLs Fall Risk Fall Risk Assessment, last 12 mths 2/15/2019 Able to walk? Yes Fall in past 12 months? No  
 
Abuse Screen Patient is not abused Review of Systems A comprehensive review of systems was negative except for that written in the HPI. Physical Examination Evaluation of Cognitive Function: 
Mood/affect:  neutral 
Appearance: age appropriate, well dressed and within normal Limits Family member/caregiver input: na 
 
Patient Care Team: 
Monica Otero MD as PCP - General (Internal Medicine) Tameka Wagner RN as Ambulatory Care Navigator (Internal Medicine) Flaco Sheldon MD as Physician (Urology) Anisha Nguyễn DPM (Podiatry) Henry Navarrete MD (Ophthalmology) Mata Palomino MD (Ophthalmology) Advice/Referrals/Counseling Education and counseling provided: 
Are appropriate based on today's review and evaluation End-of-Life planning (with patient's consent) Pneumococcal Vaccine Influenza Vaccine Prostate cancer screening tests (PSA, covered annually) Colorectal cancer screening tests Cardiovascular screening blood test 
 
Assessment/Plan ICD-10-CM ICD-9-CM 1. Medicare annual wellness visit, subsequent Z00.00 V70.0 2. ESRD (end stage renal disease) ([facility] [address] N18.6 585.6 3. Type 2 diabetes, controlled, with neuropathy [facility] Dr Adi Pan E11.40 250.60   
  357.2 4. Obstructive sleep apnea syndrome G47.33 327.23   
5. Primary hypertension I10 401.9 6.  GERD without esophagitis K21.9 530.81   
 7. End stage renal disease on dialysis ([facility] N18.6 585.6 Z99.2 V45.11   
8. Dyslipidemia E78.5 272.4 9. Diabetic retinopathy associated with type 2 diabetes mellitus, macular edema presence unspecified, unspecified laterality, unspecified retinopathy severity ([facility] E11.319 250.50   
  362.01   
10. Colon adenoma 2/15 Dr. Shelby Hooker D12.6 211.3 11. Osteoarthritis, unspecified osteoarthritis type, unspecified site M19.90 715.90   
12. Advanced directives, counseling/discussion Z71.89 V65.49 ADVANCE CARE PLANNING FIRST 30 MINS 13. Screening for alcoholism Z13.39 V79.1 [place] ANNUAL ALCOHOL SCREEN 15 MIN 14. Screening for depression Z13.31 V79.0 [facility] 15. Screen for colon cancer Z12.11 V76.51   
16. Screening for ischemic heart disease Z13.6 V81.0   
 
current treatment plan is effective, no change in therapy 
lab results and schedule of future lab studies reviewed with patient 
reviewed diet, exercise and weight control 
cardiovascular risk and specific lipid/LDL goals reviewed. End of life discussion undertaken. Will work on amd 
Flu high dose when in season 
sachin advocated Colonoscopy to be scheduled 2023

## 2019-03-21 ENCOUNTER — APPOINTMENT (OUTPATIENT)
Dept: CT IMAGING | Age: 64
DRG: 555 | End: 2019-03-21
Attending: EMERGENCY MEDICINE
Payer: MEDICARE

## 2019-03-21 ENCOUNTER — HOSPITAL ENCOUNTER (INPATIENT)
Age: 64
LOS: 1 days | Discharge: HOME | DRG: 555 | End: 2019-03-22
Attending: EMERGENCY MEDICINE | Admitting: INTERNAL MEDICINE
Payer: MEDICARE

## 2019-03-21 ENCOUNTER — APPOINTMENT (OUTPATIENT)
Dept: GENERAL RADIOLOGY | Age: 64
DRG: 555 | End: 2019-03-21
Attending: EMERGENCY MEDICINE
Payer: MEDICARE

## 2019-03-21 ENCOUNTER — APPOINTMENT (OUTPATIENT)
Dept: MRI IMAGING | Age: 64
DRG: 555 | End: 2019-03-21
Attending: INTERNAL MEDICINE
Payer: MEDICARE

## 2019-03-21 DIAGNOSIS — N18.6 ESRD (END STAGE RENAL DISEASE) (HCC): ICD-10-CM

## 2019-03-21 DIAGNOSIS — R29.898 WEAKNESS OF RIGHT LOWER EXTREMITY: Primary | ICD-10-CM

## 2019-03-21 LAB
ANION GAP SERPL CALC-SCNC: 9 MMOL/L (ref 3–18)
APTT PPP: 38.6 SEC (ref 23–36.4)
ATRIAL RATE: 67 BPM
BASOPHILS # BLD: 0 K/UL (ref 0–0.1)
BASOPHILS NFR BLD: 1 % (ref 0–2)
BUN SERPL-MCNC: 32 MG/DL (ref 7–18)
BUN/CREAT SERPL: 4 (ref 12–20)
CALCIUM SERPL-MCNC: 9.6 MG/DL (ref 8.5–10.1)
CALCULATED P AXIS, ECG09: 56 DEGREES
CALCULATED R AXIS, ECG10: -19 DEGREES
CALCULATED T AXIS, ECG11: 8 DEGREES
CHLORIDE SERPL-SCNC: 94 MMOL/L (ref 100–108)
CO2 SERPL-SCNC: 33 MMOL/L (ref 21–32)
CREAT SERPL-MCNC: 8.04 MG/DL (ref 0.6–1.3)
DIAGNOSIS, 93000: NORMAL
DIFFERENTIAL METHOD BLD: ABNORMAL
EOSINOPHIL # BLD: 0.1 K/UL (ref 0–0.4)
EOSINOPHIL NFR BLD: 2 % (ref 0–5)
ERYTHROCYTE [DISTWIDTH] IN BLOOD BY AUTOMATED COUNT: 14.3 % (ref 11.6–14.5)
GLUCOSE BLD STRIP.AUTO-MCNC: 101 MG/DL (ref 70–110)
GLUCOSE BLD STRIP.AUTO-MCNC: 85 MG/DL (ref 70–110)
GLUCOSE BLD STRIP.AUTO-MCNC: 97 MG/DL (ref 70–110)
GLUCOSE SERPL-MCNC: 105 MG/DL (ref 74–99)
HCT VFR BLD AUTO: 35.5 % (ref 36–48)
HGB BLD-MCNC: 12 G/DL (ref 13–16)
INR PPP: 1 (ref 0.8–1.2)
LYMPHOCYTES # BLD: 0.9 K/UL (ref 0.9–3.6)
LYMPHOCYTES NFR BLD: 16 % (ref 21–52)
MCH RBC QN AUTO: 31.9 PG (ref 24–34)
MCHC RBC AUTO-ENTMCNC: 33.8 G/DL (ref 31–37)
MCV RBC AUTO: 94.4 FL (ref 74–97)
MONOCYTES # BLD: 0.8 K/UL (ref 0.05–1.2)
MONOCYTES NFR BLD: 14 % (ref 3–10)
NEUTS SEG # BLD: 4 K/UL (ref 1.8–8)
NEUTS SEG NFR BLD: 67 % (ref 40–73)
P-R INTERVAL, ECG05: 214 MS
PLATELET # BLD AUTO: 261 K/UL (ref 135–420)
PMV BLD AUTO: 9.2 FL (ref 9.2–11.8)
POTASSIUM SERPL-SCNC: 4 MMOL/L (ref 3.5–5.5)
PROTHROMBIN TIME: 13.3 SEC (ref 11.5–15.2)
Q-T INTERVAL, ECG07: 462 MS
QRS DURATION, ECG06: 130 MS
QTC CALCULATION (BEZET), ECG08: 488 MS
RBC # BLD AUTO: 3.76 M/UL (ref 4.7–5.5)
SODIUM SERPL-SCNC: 136 MMOL/L (ref 136–145)
TROPONIN I SERPL-MCNC: 0.04 NG/ML (ref 0–0.04)
VENTRICULAR RATE, ECG03: 67 BPM
WBC # BLD AUTO: 5.8 K/UL (ref 4.6–13.2)

## 2019-03-21 PROCEDURE — 70450 CT HEAD/BRAIN W/O DYE: CPT

## 2019-03-21 PROCEDURE — 80048 BASIC METABOLIC PNL TOTAL CA: CPT

## 2019-03-21 PROCEDURE — 74011000250 HC RX REV CODE- 250: Performed by: INTERNAL MEDICINE

## 2019-03-21 PROCEDURE — 84484 ASSAY OF TROPONIN QUANT: CPT

## 2019-03-21 PROCEDURE — 82962 GLUCOSE BLOOD TEST: CPT

## 2019-03-21 PROCEDURE — 85025 COMPLETE CBC W/AUTO DIFF WBC: CPT

## 2019-03-21 PROCEDURE — 71045 X-RAY EXAM CHEST 1 VIEW: CPT

## 2019-03-21 PROCEDURE — 65270000029 HC RM PRIVATE

## 2019-03-21 PROCEDURE — 85730 THROMBOPLASTIN TIME PARTIAL: CPT

## 2019-03-21 PROCEDURE — 85610 PROTHROMBIN TIME: CPT

## 2019-03-21 PROCEDURE — 74011250636 HC RX REV CODE- 250/636: Performed by: INTERNAL MEDICINE

## 2019-03-21 PROCEDURE — 72148 MRI LUMBAR SPINE W/O DYE: CPT

## 2019-03-21 PROCEDURE — 99285 EMERGENCY DEPT VISIT HI MDM: CPT

## 2019-03-21 PROCEDURE — 93005 ELECTROCARDIOGRAM TRACING: CPT

## 2019-03-21 PROCEDURE — 74011250637 HC RX REV CODE- 250/637: Performed by: INTERNAL MEDICINE

## 2019-03-21 RX ORDER — PANTOPRAZOLE SODIUM 40 MG/1
40 TABLET, DELAYED RELEASE ORAL
Status: DISCONTINUED | OUTPATIENT
Start: 2019-03-22 | End: 2019-03-23 | Stop reason: HOSPADM

## 2019-03-21 RX ORDER — ATORVASTATIN CALCIUM 40 MG/1
40 TABLET, FILM COATED ORAL DAILY
Status: DISCONTINUED | OUTPATIENT
Start: 2019-03-22 | End: 2019-03-23 | Stop reason: HOSPADM

## 2019-03-21 RX ORDER — ASPIRIN 81 MG/1
81 TABLET ORAL DAILY
Status: DISCONTINUED | OUTPATIENT
Start: 2019-03-22 | End: 2019-03-23 | Stop reason: HOSPADM

## 2019-03-21 RX ORDER — CLONIDINE HYDROCHLORIDE 0.1 MG/1
0.1 TABLET ORAL 2 TIMES DAILY
Status: DISCONTINUED | OUTPATIENT
Start: 2019-03-21 | End: 2019-03-23 | Stop reason: HOSPADM

## 2019-03-21 RX ORDER — HEPARIN SODIUM 5000 [USP'U]/ML
5000 INJECTION, SOLUTION INTRAVENOUS; SUBCUTANEOUS EVERY 8 HOURS
Status: DISCONTINUED | OUTPATIENT
Start: 2019-03-21 | End: 2019-03-23 | Stop reason: HOSPADM

## 2019-03-21 RX ORDER — SODIUM CHLORIDE 0.9 % (FLUSH) 0.9 %
5-40 SYRINGE (ML) INJECTION AS NEEDED
Status: DISCONTINUED | OUTPATIENT
Start: 2019-03-21 | End: 2019-03-23 | Stop reason: HOSPADM

## 2019-03-21 RX ORDER — CINACALCET 60 MG/1
60 TABLET, FILM COATED ORAL
Status: DISCONTINUED | OUTPATIENT
Start: 2019-03-21 | End: 2019-03-23 | Stop reason: HOSPADM

## 2019-03-21 RX ORDER — NIFEDIPINE 60 MG/1
60 TABLET, EXTENDED RELEASE ORAL DAILY
Status: DISCONTINUED | OUTPATIENT
Start: 2019-03-22 | End: 2019-03-23 | Stop reason: HOSPADM

## 2019-03-21 RX ORDER — ISOSORBIDE DINITRATE 20 MG/1
20 TABLET ORAL 2 TIMES DAILY
Status: DISCONTINUED | OUTPATIENT
Start: 2019-03-21 | End: 2019-03-23 | Stop reason: HOSPADM

## 2019-03-21 RX ORDER — DIFLUPREDNATE 0.5 MG/ML
1 EMULSION OPHTHALMIC 3 TIMES DAILY
Status: DISCONTINUED | OUTPATIENT
Start: 2019-03-21 | End: 2019-03-23 | Stop reason: HOSPADM

## 2019-03-21 RX ORDER — SODIUM CHLORIDE 0.9 % (FLUSH) 0.9 %
5-40 SYRINGE (ML) INJECTION EVERY 8 HOURS
Status: DISCONTINUED | OUTPATIENT
Start: 2019-03-21 | End: 2019-03-23 | Stop reason: HOSPADM

## 2019-03-21 RX ORDER — DOCUSATE SODIUM 100 MG/1
100 CAPSULE, LIQUID FILLED ORAL 2 TIMES DAILY
Status: DISCONTINUED | OUTPATIENT
Start: 2019-03-21 | End: 2019-03-23 | Stop reason: HOSPADM

## 2019-03-21 RX ORDER — TIZANIDINE 4 MG/1
4 TABLET ORAL
Status: DISCONTINUED | OUTPATIENT
Start: 2019-03-21 | End: 2019-03-23 | Stop reason: HOSPADM

## 2019-03-21 RX ORDER — TIMOLOL MALEATE 5 MG/ML
1 SOLUTION/ DROPS OPHTHALMIC 2 TIMES DAILY
Status: DISCONTINUED | OUTPATIENT
Start: 2019-03-21 | End: 2019-03-23 | Stop reason: HOSPADM

## 2019-03-21 RX ORDER — THERA TABS 400 MCG
1 TAB ORAL DAILY
Status: DISCONTINUED | OUTPATIENT
Start: 2019-03-22 | End: 2019-03-23 | Stop reason: HOSPADM

## 2019-03-21 RX ORDER — CARVEDILOL 25 MG/1
25 TABLET ORAL 2 TIMES DAILY
Status: DISCONTINUED | OUTPATIENT
Start: 2019-03-21 | End: 2019-03-23 | Stop reason: HOSPADM

## 2019-03-21 RX ADMIN — CINACALCET HYDROCHLORIDE 60 MG: 60 TABLET, COATED ORAL at 22:32

## 2019-03-21 RX ADMIN — CARVEDILOL 25 MG: 25 TABLET, FILM COATED ORAL at 22:33

## 2019-03-21 RX ADMIN — DUREZOL 1 DROP: 0.5 EMULSION OPHTHALMIC at 22:44

## 2019-03-21 RX ADMIN — CLONIDINE HYDROCHLORIDE 0.1 MG: 0.1 TABLET ORAL at 22:33

## 2019-03-21 RX ADMIN — TIMOLOL MALEATE 1 DROP: 5 SOLUTION OPHTHALMIC at 22:44

## 2019-03-21 RX ADMIN — ISOSORBIDE DINITRATE 20 MG: 20 TABLET ORAL at 22:34

## 2019-03-21 RX ADMIN — DOCUSATE SODIUM 100 MG: 100 CAPSULE, LIQUID FILLED ORAL at 22:33

## 2019-03-21 RX ADMIN — METHYLPREDNISOLONE SODIUM SUCCINATE 60 MG: 40 INJECTION, POWDER, FOR SOLUTION INTRAMUSCULAR; INTRAVENOUS at 22:34

## 2019-03-21 RX ADMIN — HEPARIN SODIUM 5000 UNITS: 5000 INJECTION INTRAVENOUS; SUBCUTANEOUS at 22:35

## 2019-03-21 RX ADMIN — Medication 10 ML: at 22:35

## 2019-03-21 NOTE — ROUTINE PROCESS
TRANSFER - OUT REPORT: 
 
Verbal report given to Uziel Alvarez RN (name) on Frantz Shows  being transferred to [facility] room 419 (unit) for routine progression of care Report consisted of patients Situation, Background, Assessment and  
Recommendations(SBAR). Information from the following report(s) SBAR was reviewed with the receiving nurse. Lines:  
Peripheral IV 03/21/19 Right Antecubital (Active) Site Assessment Clean, dry, & intact 3/21/2019  4:00 PM  
Phlebitis Assessment 0 3/21/2019  4:00 PM  
Infiltration Assessment 0 3/21/2019  4:00 PM  
Dressing Status Clean, dry, & intact 3/21/2019  4:00 PM  
Dressing Type Transparent;Tape 3/21/2019  4:00 PM  
Hub Color/Line Status Pink;Patent; Flushed 3/21/2019  4:00 PM  
Action Taken Blood drawn 3/21/2019  4:00 PM  
Alcohol Cap Used Yes 3/21/2019  4:00 PM  
  
 
Opportunity for questions and clarification was provided. Patient transported with: 
 Monitor

## 2019-03-21 NOTE — ED PROVIDER NOTES
EMERGENCY DEPARTMENT HISTORY AND PHYSICAL EXAM 
 
3:54 PM 
 
 
Date: 3/21/2019 Patient Name: Pebbles Wood History of Presenting Illness Chief Complaint Patient presents with  Numbness History Provided By: Patient Patient is a 76-year-old male presents to the ED with a complaint of acute right leg weakness and numbness that began at 10 AM today. He is a dialysis patient and had his last dialysis yesterday. He was noting some mild hip pain and then his right leg became numb and weak. He also feels lightheaded without a headache. He denies any nausea chest pain or right arm symptoms. He does not take any anticoagulation and has no history of stroke or trauma. Patient's family is at the bedside. There are no aggravating or alleviating factors. PCP: Adam Frost MD 
 
Current Outpatient Medications Medication Sig Dispense Refill  atorvastatin (LIPITOR) 40 mg tablet Take 1 Tab by mouth daily. 90 Tab 3  
 DUREZOL 0.05 % ophthalmic emulsion INSTILL 1 DROP LEFT EYE TID AS DIRECTED  3  
 AURYXIA 210 mg iron tablet Take 210 mg by mouth three (3) times daily (with meals).  cinacalcet (SENSIPAR) 60 mg tab Take 60 mg by mouth nightly.  pantoprazole (PROTONIX) 40 mg tablet TAKE 1 TABLET BY MOUTH TWICE DAILY 180 Tab 0  
 isosorbide dinitrate (ISORDIL) 20 mg tablet Take 1 Tab by mouth two (2) times a day. 180 Tab 3  cloNIDine HCl (CATAPRES) 0.1 mg tablet Take  by mouth two (2) times a day.  docusate sodium (COLACE) 100 mg capsule Take 100 mg by mouth two (2) times a day.  cinnamon bark (CINNAMON) 500 mg cap 500 mg.  timolol (TIMOPTIC) 0.5 % ophthalmic solution Administer 1 Drop to both eyes two (2) times a day. 3  
 aspirin delayed-release 81 mg tablet Take 81 mg by mouth daily.  NIFEdipine XL (PROCARDIA-XL) 90 mg tablet Take 60 mg by mouth daily.  carvedilol (COREG) 25 mg tablet Take 25 mg by mouth two (2) times a day.  MULTIVITAMIN PO Take  by mouth daily. Past History Past Medical History: 
Past Medical History:  
Diagnosis Date  Acute hearing loss of right ear  Acute urinary retention 8/15  Anemia of chronic disease 2010  
 saw Dr. Janith Sacks in past  
 Asbestosis(501)  Chronic edema  Chronic kidney disease HD- W-W-F  Colon adenoma Dr. Pleasant Closs 2009, 2/15, 4/18  Diabetes mellitus (HCC)   
 w neuropathy and retinopathy Dr. Noah Shipley  DJD (degenerative joint disease)  Dyslipidemia  Erectile dysfunction  ESRD on hemodialysis ([facility] [address] Dr. Oren Real, M-W-F  FHx: heart disease  GERD (gastroesophageal reflux disease)  Glaucoma Dr. Cyn Vicente failure [facility]) 2011  
 HTN (hypertension)  Kidney stone  Sarcoidosis  Sleep apnea NOT using cpap (states resolved)  Thyroid disease Past Surgical History: 
Past Surgical History:  
Procedure Laterality Date  COLONOSCOPY N/A 4/10/2018 Dr. Pleasant Closs adenoma 2009, 2/15; adenoma and hyperplastic 4/10/18  HX CATARACT REMOVAL    
 HX ORTHOPAEDIC  01/2018  
 left wrist and humerus fx Dr Danish Steward  HX UROLOGICAL  9/15  
 cystoscopy negative Dr Kavitha Vital  NE INTRO CATH DIALYSIS CIRCUIT DX ANGRPH FLUOR S&I N/A 8/1/2018 Fistulogram Left performed by Bryon Cid MD at [address] [facility] LAB  STRESS TEST THALLIUM STUDY  2009  
 negative  VASCULAR SURGERY PROCEDURE UNLIST  3/14  
 nl BALDEMAR bilat  VASCULAR SURGERY PROCEDURE UNLIST    
 LEFT ARM GRAFT FOR HD Family History: 
Family History Problem Relation Age of Onset  Diabetes Father  Heart Disease Sister  Diabetes Brother  Heart Disease Brother  Diabetes Brother  Diabetes Sister Social History: 
Social History Tobacco Use  Smoking status: Never Smoker  Smokeless tobacco: Never Used Substance Use Topics  Alcohol use: No  
 Drug use: No  
 
 
Allergies: 
No Known Allergies Review of Systems Review of Systems Constitutional: Negative for activity change, fatigue and fever. HENT: Negative for congestion and rhinorrhea. Eyes: Negative for visual disturbance. Respiratory: Negative for shortness of breath. Cardiovascular: Negative for chest pain and palpitations. Gastrointestinal: Negative for abdominal pain, diarrhea, nausea and vomiting. Genitourinary: Negative for dysuria and hematuria. Musculoskeletal: Negative for back pain. Skin: Negative for rash. Neurological: Positive for dizziness, weakness, light-headedness and numbness. All other systems reviewed and are negative. Physical Exam  
 
Visit Vitals /69 Pulse 70 Temp 97.7 °F (36.5 °C) Resp 19 Ht 6' (1.829 m) Wt 78.9 kg (174 lb) SpO2 97% BMI 23.60 kg/m² Physical Exam  
Constitutional: He is oriented to person, place, and time. He appears well-developed and well-nourished. No distress. No distress HENT:  
Head: Normocephalic and atraumatic. Right Ear: External ear normal.  
Left Ear: External ear normal.  
Nose: Nose normal.  
Mouth/Throat: Oropharynx is clear and moist.  
Eyes: Pupils are equal, round, and reactive to light. Conjunctivae and EOM are normal. No scleral icterus. Neck: Normal range of motion. Neck supple. No JVD present. No tracheal deviation present. No thyromegaly present. Cardiovascular: Normal rate, regular rhythm, normal heart sounds and intact distal pulses. Exam reveals no gallop and no friction rub. No murmur heard. Pulmonary/Chest: Effort normal and breath sounds normal. He exhibits no tenderness. Abdominal: Soft. Bowel sounds are normal. He exhibits no distension. There is no tenderness. There is no rebound and no guarding. Musculoskeletal: Normal range of motion. He exhibits no edema or tenderness. Left arm with AV access noted Lymphadenopathy:  
  He has no cervical adenopathy. Neurological: He is alert and oriented to person, place, and time. No cranial nerve deficit. Coordination normal.  
Right leg cannot be held up against gravity, full strength noted in all other extremities, speech is clear, gait not observed Skin: Skin is warm and dry. Psychiatric: He has a normal mood and affect. His behavior is normal. Judgment and thought content normal.  
Supportive and insightful family at the bedside Nursing note and vitals reviewed. Diagnostic Study Results Labs - Recent Results (from the past 12 hour(s)) EKG, 12 LEAD, INITIAL Collection Time: 03/21/19  3:54 PM  
Result Value Ref Range Ventricular Rate 67 BPM  
 Atrial Rate 67 BPM  
 P-R Interval 214 ms QRS Duration 130 ms  
 Q-T Interval 462 ms QTC Calculation (Bezet) 488 ms Calculated P Axis 56 degrees Calculated R Axis -19 degrees Calculated T Axis 8 degrees Diagnosis Sinus rhythm with 1st degree AV block Right bundle branch block Abnormal ECG When compared with ECG of 06-JAN-2016 11:21, No significant change was found GLUCOSE, POC Collection Time: 03/21/19  3:58 PM  
Result Value Ref Range Glucose (POC) 101 70 - 110 mg/dL CBC WITH AUTOMATED DIFF Collection Time: 03/21/19  4:00 PM  
Result Value Ref Range WBC 5.8 4.6 - 13.2 K/uL  
 RBC 3.76 (L) 4.70 - 5.50 M/uL  
 HGB 12.0 (L) 13.0 - 16.0 g/dL HCT 35.5 (L) 36.0 - 48.0 % MCV 94.4 74.0 - 97.0 FL  
 MCH 31.9 24.0 - 34.0 PG  
 MCHC 33.8 31.0 - 37.0 g/dL  
 RDW 14.3 11.6 - 14.5 % PLATELET 502 484 - 961 K/uL MPV 9.2 9.2 - 11.8 FL  
 NEUTROPHILS 67 40 - 73 % LYMPHOCYTES 16 (L) 21 - 52 % MONOCYTES 14 (H) 3 - 10 % EOSINOPHILS 2 0 - 5 % BASOPHILS 1 0 - 2 %  
 ABS. NEUTROPHILS 4.0 1.8 - 8.0 K/UL  
 ABS. LYMPHOCYTES 0.9 0.9 - 3.6 K/UL  
 ABS. MONOCYTES 0.8 0.05 - 1.2 K/UL  
 ABS. EOSINOPHILS 0.1 0.0 - 0.4 K/UL  
 ABS. BASOPHILS 0.0 0.0 - 0.1 K/UL  
 DF AUTOMATED    
PTT Collection Time: 03/21/19  4:00 PM  
Result Value Ref Range aPTT 38.6 (H) 23.0 - 36.4 SEC PROTHROMBIN TIME + INR Collection Time: 03/21/19  4:00 PM  
Result Value Ref Range Prothrombin time 13.3 11.5 - 15.2 sec INR 1.0 0.8 - 1.2 METABOLIC PANEL, BASIC Collection Time: 03/21/19  4:00 PM  
Result Value Ref Range Sodium 136 136 - 145 mmol/L Potassium 4.0 3.5 - 5.5 mmol/L Chloride 94 (L) 100 - 108 mmol/L  
 CO2 33 (H) 21 - 32 mmol/L Anion gap 9 3.0 - 18 mmol/L Glucose 105 (H) 74 - 99 mg/dL BUN 32 (H) 7.0 - 18 MG/DL Creatinine 8.04 (H) 0.6 - 1.3 MG/DL  
 BUN/Creatinine ratio 4 (L) 12 - 20 GFR est AA 8 (L) >60 ml/min/1.73m2 GFR est non-AA 7 (L) >60 ml/min/1.73m2 Calcium 9.6 8.5 - 10.1 MG/DL  
TROPONIN I Collection Time: 03/21/19  4:00 PM  
Result Value Ref Range Troponin-I, QT 0.04 0.0 - 0.045 NG/ML Radiologic Studies -  
CT HEAD WO CONT Final Result IMPRESSION:  
  
No acute intracranial hemorrhage or mass effect. Note: If clinical concern of acute stroke, CTA or MRI with diffusion weighted  
images is recommended for further evaluation. Stable mild microvascular disease and old lacunar infarcts in right basal  
ganglia. The wet read was provided to the ordering physician in ER,, Dr. Alie Pablo, by  
me upon the initial interpretation at approximately 1612 hours, confirmation  
received. Thank you for your referral.   
  
XR CHEST SNGL V    (Results Pending) MRI BRAIN WO CONT    (Results Pending) MRI LUMB SPINE WO CONT    (Results Pending) Medical Decision Making I am the first provider for this patient. I reviewed the vital signs, available nursing notes, past medical history, past surgical history, family history and social history. Vital Signs-Reviewed the patient's vital signs.  
 
Pulse Oximetry Analysis -97% room air Celeste Lee at 4:45 PM 
 
 Cardiac Monitor: Sinus rhythm at 67 Luci Copeland at 4:45 PM 
 
EKG: Sinus rhythm 67 with first-degree AV block, right bundle branch block, read at 1554 by Luci Copeland Records Reviewed: Nursing Notes and Old Medical Records (Time of Review: 3:54 PM) 
 
ED Course: Progress Notes, Reevaluation, and Consults: 
 
Seen and examined at 3:54 PM 
 
Cost the case discussed the case with Carlitos Suarez with tele-neurology. He will evaluate the patient. Luci Copeland DO 4:04 PM 
 
Called by radiology no acute findings on the CT head at 4:13 PM. 
 
Patient was seen by tele-neurology and stroke remains in the differential versus a cord compression. He would like the patient to be admitted to have MRI of the lumbar spine today with MRI of the brain during the admission. We will talk to the hospitalist regarding plan. Discussed case with Dr. Carlitos Sears and will admit the patient to telemetry and will obtain the MRIs over at [facility]. Charge RN has called [facility] to alert them of the need for MRI. Luci Copeland DO 4:46 PM 
 
 
Provider Notes (Medical Decision Making): MDM Number of Diagnoses or Management Options Diagnosis management comments: Patient is a 57-year-old male with a history of end-stage renal disease with evaluations for transplant, on dialysis Monday Wednesday Friday with a full run yesterday, sarcoidosis, dyslipidemia, and heart failure that presents to the ED with acute onset right leg numbness and weakness that began at 10 AM today. Code S was called on arrival as he is significantly weak in the right leg although there is no other findings in other extremities. He is outside of the TPA window may be a candidate for intervention if tele-neurology is concerned about stroke. Will follow stroke workup and the recommendations of tele-neurology. Luci Copeland DO 4:15 PM 
 
 
 
Procedures Critical Care Time: Critical Care Time: 
 The services I provided to this patient were to treat and/or prevent clinically significant deterioration that could result in the failure of one or more body systems and/or organ systems due to stroke. Services included the following: 
-reviewing nursing notes and old charts 
-vital sign assessments 
-direct patient care 
-medication orders and management 
-interpreting and reviewing diagnostic studies/labs 
-re-evaluations 
-documentation time Aggregate critical care time was 45 minutes, which includes only time during which I was engaged in work directly related to the patient's care as described above, whether I was at bedside or elsewhere in the Emergency Department. It did not include time spent performing other reported procedures or the services of residents, students, nurses, or advance practice providers. Ana Cristina Sanchez, DO 4:43 PM 
 
 
 
Diagnosis Clinical Impression:  
1. Weakness of right lower extremity 2. ESRD (end stage renal disease) ([facility] [address] Disposition: Admit Follow-up Information None Patient's Medications Start Taking No medications on file Continue Taking ASPIRIN DELAYED-RELEASE 81 MG TABLET    Take 81 mg by mouth daily. ATORVASTATIN (LIPITOR) 40 MG TABLET    Take 1 Tab by mouth daily. AURYXIA 210 MG IRON TABLET    Take 210 mg by mouth three (3) times daily (with meals). CARVEDILOL (COREG) 25 MG TABLET    Take 25 mg by mouth two (2) times a day. CINACALCET (SENSIPAR) 60 MG TAB    Take 60 mg by mouth nightly. CINNAMON BARK (CINNAMON) 500 MG CAP    500 mg. CLONIDINE HCL (CATAPRES) 0.1 MG TABLET    Take  by mouth two (2) times a day. DOCUSATE SODIUM (COLACE) 100 MG CAPSULE    Take 100 mg by mouth two (2) times a day. DUREZOL 0.05 % OPHTHALMIC EMULSION    INSTILL 1 DROP LEFT EYE TID AS DIRECTED ISOSORBIDE DINITRATE (ISORDIL) 20 MG TABLET    Take 1 Tab by mouth two (2) times a day. MULTIVITAMIN PO    Take  by mouth daily. NIFEDIPINE XL (PROCARDIA-XL) 90 MG TABLET    Take 60 mg by mouth daily. PANTOPRAZOLE (PROTONIX) 40 MG TABLET    TAKE 1 TABLET BY MOUTH TWICE DAILY  
 TIMOLOL (TIMOPTIC) 0.5 % OPHTHALMIC SOLUTION    Administer 1 Drop to both eyes two (2) times a day. These Medications have changed No medications on file Stop Taking No medications on file

## 2019-03-21 NOTE — H&P
History & Physical 
Patient: Govind Painting MRN: [id number]  CSN: [id number] YOB: 1955  Age: 70 y.o. Sex: male DOA: 3/21/2019 Chief Complaint Patient presents with  Numbness of right leg HPI:  
 
Govind Painting is a 70 y.o. male who presents to the ED with a complaint of acute right leg weakness and numbness that began at 10 AM today. He twisted his back 2 weeks ago when he fell. He does have the back pain. He is a M-F-W dialysis patient and had his last dialysis was yesterday. He was noting some mild hip pain and then his right leg became numb and weak. He also feels lightheaded without a headache. He denies any nausea chest pain or right arm symptoms. Past Medical History:  
Diagnosis Date  Acute hearing loss of right ear  Acute urinary retention 8/15  Anemia of chronic disease 2010  
 saw Dr. Viviana Garcia in past  
 Asbestosis(501)  Chronic edema  Chronic kidney disease HD- W-W-F  Colon adenoma Dr. Soren Ibrahim 2009, 2/15, 4/18  Diabetes mellitus (HCC)   
 w neuropathy and retinopathy Dr. Jese Caba  DJDARCI (degenerative joint disease)  Dyslipidemia  Erectile dysfunction  ESRD on hemodialysis ([facility] [address] Dr. Oniel Latham, M-W-F  FHx: heart disease  GERD (gastroesophageal reflux disease)  Glaucoma Dr. Gregg Prim failure [facility]) 2011  
 HTN (hypertension)  Kidney stone  Sarcoidosis  Sleep apnea NOT using cpap (states resolved)  Thyroid disease Past Surgical History:  
Procedure Laterality Date  COLONOSCOPY N/A 4/10/2018 Dr. Soren Ibrahim adenoma 2009, 2/15; adenoma and hyperplastic 4/10/18  HX CATARACT REMOVAL    
 HX ORTHOPAEDIC  01/2018  
 left wrist and humerus fx Dr Domonique Salmeron  HX UROLOGICAL  9/15  
 cystoscopy negative Dr Beto Justice  NM INTRO CATH DIALYSIS CIRCUIT DX ANGRPH FLUOR S&I N/A 8/1/2018  Fistulogram Left performed by Kaila Crowell MD at [address] [place] [address]  
  STRESS TEST THALLIUM STUDY  2009  
 negative  VASCULAR SURGERY PROCEDURE UNLIST  3/14  
 nl BALDEMAR bilat  VASCULAR SURGERY PROCEDURE UNLIST    
 LEFT ARM GRAFT FOR HD Family History Problem Relation Age of Onset  Diabetes Father  Heart Disease Sister  Diabetes Brother  Heart Disease Brother  Diabetes Brother  Diabetes Sister Social History Socioeconomic History  Marital status:  Spouse name: Not on file  Number of children: 3  
 Years of education: Not on file  Highest education level: Not on file Occupational History  Occupation: [employer] Tobacco Use  Smoking status: Never Smoker  Smokeless tobacco: Never Used Substance and Sexual Activity  Alcohol use: No  
 Drug use: No  
 Sexual activity: Never Prior to Admission medications Medication Sig Start Date End Date Taking? Authorizing Provider  
atorvastatin (LIPITOR) 40 mg tablet Take 1 Tab by mouth daily. 2/17/19   Jenni Tai MD  
DUREZOL 0.05 % ophthalmic emulsion INSTILL 1 DROP LEFT EYE TID AS DIRECTED 2/13/19   Provider, Historical  
AURYXIA 210 mg iron tablet Take 210 mg by mouth three (3) times daily (with meals). 2/4/19   Provider, Historical  
cinacalcet (SENSIPAR) 60 mg tab Take 60 mg by mouth nightly. Provider, Historical  
pantoprazole (PROTONIX) 40 mg tablet TAKE 1 TABLET BY MOUTH TWICE DAILY 12/21/18   Ren Zee MD  
isosorbide dinitrate (ISORDIL) 20 mg tablet Take 1 Tab by mouth two (2) times a day. 6/26/18   Jenni Tai MD  
cloNIDine HCl (CATAPRES) 0.1 mg tablet Take  by mouth two (2) times a day. Provider, Historical  
docusate sodium (COLACE) 100 mg capsule Take 100 mg by mouth two (2) times a day. Provider, Historical  
cinnamon bark (CINNAMON) 500 mg cap 500 mg.     Provider, Historical  
timolol (TIMOPTIC) 0.5 % ophthalmic solution Administer 1 Drop to both eyes two (2) times a day. 7/8/15   Provider, Historical  
aspirin delayed-release 81 mg tablet Take 81 mg by mouth daily. Provider, Historical  
NIFEdipine XL (PROCARDIA-XL) 90 mg tablet Take 60 mg by mouth daily. Provider, Historical  
carvedilol (COREG) 25 mg tablet Take 25 mg by mouth two (2) times a day. Provider, Historical  
MULTIVITAMIN PO Take  by mouth daily. Provider, Historical  
 
 
No Known Allergies Review of Systems GENERAL: No fevers or chills. HEENT: No change in vision, no earache, tinnitus, sore throat or sinus congestion. NECK: No pain or stiffness. CARDIOVASCULAR: No chest pain or pressure. No palpitations. PULMONARY: No shortness of breath, cough or wheeze. GASTROINTESTINAL: No abdominal pain, nausea, vomiting or diarrhea, melena or       bright red blood per rectum. GENITOURINARY: No urinary frequency, urgency, hesitancy or dysuria. MUSCULOSKELETAL: + right leg weakness and the numbness, had a fall 2 weeks ago. + back pain. DERMATOLOGIC: No rash, no itching, no lesions. ENDOCRINE: No polyuria, polydipsia, no heat or cold intolerance. No recent change in    weight. HEMATOLOGICAL: No anemia or easy bruising or bleeding. NEUROLOGIC: No headache, seizures, numbness, tingling or weakness. PSYCHIATRIC: No depression, anxiety, mood disorder, no loss of interest in normal activity or change in sleep pattern. Physical Exam:  
 
Physical Exam: 
Visit Vitals /76 (BP 1 Location: Left arm, BP Patient Position: Supine) Pulse 69 Temp 97.7 °F (36.5 °C) Resp 15 Ht 5' 11\" (1.803 m) Wt 78.7 kg (173 lb 9.6 oz) SpO2 96% BMI 24.21 kg/m² O2 Device: Room air Temp (24hrs), Av.7 °F (36.5 °C), Min:97.7 °F (36.5 °C), Max:97.7 °F (36.5 °C) No intake/output data recorded. No intake/output data recorded. General:  Alert, cooperative, no distress, appears stated age. Head:  Normocephalic, without obvious abnormality, atraumatic. Eyes:  Conjunctivae/corneas clear. PERRL, EOMs intact. Nose: Nares normal. No drainage or sinus tenderness. Throat: Lips, mucosa, and tongue normal. Teeth and gums normal.  
Neck: Supple, symmetrical, trachea midline, no adenopathy, thyroid: no enlargement/tenderness/nodules, no carotid bruit and no JVD. Back:   ROM normal. No CVA tenderness. Lungs:   Clear to auscultation bilaterally. Chest wall:  No tenderness or deformity. Heart:  Regular rate and rhythm, S1, S2 normal, no murmur, click, rub or gallop. Abdomen: Soft, non-tender. Bowel sounds normal. No masses,  No organomegaly. Extremities: Extremities normal, atraumatic, no cyanosis or edema. Pulses: 2+ and symmetric all extremities. Skin: Skin color, texture, turgor normal. No rashes or lesions Neurologic: CNII-XII intact. + right leg weakness, 4 out of 5. DTRs of the right knee 1+. Straight leg test +ve in the right leg. Labs Reviewed: 
 
Recent Results (from the past 24 hour(s)) EKG, 12 LEAD, INITIAL Collection Time: 03/21/19  3:54 PM  
Result Value Ref Range Ventricular Rate 67 BPM  
 Atrial Rate 67 BPM  
 P-R Interval 214 ms QRS Duration 130 ms  
 Q-T Interval 462 ms QTC Calculation (Bezet) 488 ms Calculated P Axis 56 degrees Calculated R Axis -19 degrees Calculated T Axis 8 degrees Diagnosis Sinus rhythm with 1st degree AV block Right bundle branch block Abnormal ECG When compared with ECG of 06-JAN-2016 11:21, No significant change was found GLUCOSE, POC Collection Time: 03/21/19  3:58 PM  
Result Value Ref Range Glucose (POC) 101 70 - 110 mg/dL CBC WITH AUTOMATED DIFF Collection Time: 03/21/19  4:00 PM  
Result Value Ref Range WBC 5.8 4.6 - 13.2 K/uL  
 RBC 3.76 (L) 4.70 - 5.50 M/uL  
 HGB 12.0 (L) 13.0 - 16.0 g/dL HCT 35.5 (L) 36.0 - 48.0 % MCV 94.4 74.0 - 97.0 FL  
 MCH 31.9 24.0 - 34.0 PG  
 MCHC 33.8 31.0 - 37.0 g/dL  
 RDW 14.3 11.6 - 14.5 % PLATELET 078 421 - 698 K/uL MPV 9.2 9.2 - 11.8 FL  
 NEUTROPHILS 67 40 - 73 % LYMPHOCYTES 16 (L) 21 - 52 % MONOCYTES 14 (H) 3 - 10 % EOSINOPHILS 2 0 - 5 % BASOPHILS 1 0 - 2 %  
 ABS. NEUTROPHILS 4.0 1.8 - 8.0 K/UL  
 ABS. LYMPHOCYTES 0.9 0.9 - 3.6 K/UL  
 ABS. MONOCYTES 0.8 0.05 - 1.2 K/UL  
 ABS. EOSINOPHILS 0.1 0.0 - 0.4 K/UL  
 ABS. BASOPHILS 0.0 0.0 - 0.1 K/UL  
 DF AUTOMATED    
PTT Collection Time: 03/21/19  4:00 PM  
Result Value Ref Range aPTT 38.6 (H) 23.0 - 36.4 SEC PROTHROMBIN TIME + INR Collection Time: 03/21/19  4:00 PM  
Result Value Ref Range Prothrombin time 13.3 11.5 - 15.2 sec INR 1.0 0.8 - 1.2 METABOLIC PANEL, BASIC Collection Time: 03/21/19  4:00 PM  
Result Value Ref Range Sodium 136 136 - 145 mmol/L Potassium 4.0 3.5 - 5.5 mmol/L Chloride 94 (L) 100 - 108 mmol/L  
 CO2 33 (H) 21 - 32 mmol/L Anion gap 9 3.0 - 18 mmol/L Glucose 105 (H) 74 - 99 mg/dL BUN 32 (H) 7.0 - 18 MG/DL Creatinine 8.04 (H) 0.6 - 1.3 MG/DL  
 BUN/Creatinine ratio 4 (L) 12 - 20 GFR est AA 8 (L) >60 ml/min/1.73m2 GFR est non-AA 7 (L) >60 ml/min/1.73m2 Calcium 9.6 8.5 - 10.1 MG/DL  
TROPONIN I Collection Time: 03/21/19  4:00 PM  
Result Value Ref Range Troponin-I, QT 0.04 0.0 - 0.045 NG/ML  
GLUCOSE, POC Collection Time: 03/21/19  6:09 PM  
Result Value Ref Range Glucose (POC) 97 70 - 110 mg/dL Procedures/imaging: see electronic medical records for all procedures/Xrays and details which were not copied into this note but were reviewed prior to creation of Plan Assessment/Plan Active Problems: 
 
Right leg weakness (3/21/2019) 
- PT/OT 
- MRI LS spine. 
- Analgesia. - IV solumedrol. Back pain - MRI LS spine. 
- Tizanidine for the muscle relaxation. ESRD 
- consult Nephrology HTN 
- continue the home meds. DVT prophylaxis 
- SQ heparin Code status - Full code. Oziel Loaiza MD 
March 21, 2019

## 2019-03-21 NOTE — ED TRIAGE NOTES
Right leg started feeling weak after felt numbness/dull pain to right hip at 1000. Also feeling \"groggy\" and \"whoosy\".

## 2019-03-21 NOTE — ED NOTES
Patient transported to [facility] now with [facility]

## 2019-03-22 ENCOUNTER — APPOINTMENT (OUTPATIENT)
Dept: MRI IMAGING | Age: 64
DRG: 555 | End: 2019-03-22
Attending: NURSE PRACTITIONER
Payer: MEDICARE

## 2019-03-22 VITALS
BODY MASS INDEX: 24.3 KG/M2 | WEIGHT: 173.6 LBS | HEART RATE: 83 BPM | SYSTOLIC BLOOD PRESSURE: 158 MMHG | DIASTOLIC BLOOD PRESSURE: 85 MMHG | OXYGEN SATURATION: 97 % | RESPIRATION RATE: 17 BRPM | TEMPERATURE: 98.4 F | HEIGHT: 71 IN

## 2019-03-22 LAB
CHOLEST SERPL-MCNC: 220 MG/DL
EST. AVERAGE GLUCOSE BLD GHB EST-MCNC: 111 MG/DL
GLUCOSE BLD STRIP.AUTO-MCNC: 111 MG/DL (ref 70–110)
GLUCOSE BLD STRIP.AUTO-MCNC: 122 MG/DL (ref 70–110)
HBA1C MFR BLD: 5.5 % (ref 4.2–5.6)
HBV SURFACE AG SER QL: <0.1 INDEX
HBV SURFACE AG SER QL: NEGATIVE
HDLC SERPL-MCNC: 28 MG/DL (ref 40–60)
HDLC SERPL: 7.9 {RATIO} (ref 0–5)
LDLC SERPL CALC-MCNC: 167.6 MG/DL (ref 0–100)
LIPID PROFILE,FLP: ABNORMAL
TRIGL SERPL-MCNC: 122 MG/DL (ref ?–150)
VLDLC SERPL CALC-MCNC: 24.4 MG/DL

## 2019-03-22 PROCEDURE — 90935 HEMODIALYSIS ONE EVALUATION: CPT

## 2019-03-22 PROCEDURE — 74011250637 HC RX REV CODE- 250/637: Performed by: INTERNAL MEDICINE

## 2019-03-22 PROCEDURE — 97162 PT EVAL MOD COMPLEX 30 MIN: CPT

## 2019-03-22 PROCEDURE — 70551 MRI BRAIN STEM W/O DYE: CPT

## 2019-03-22 PROCEDURE — 83036 HEMOGLOBIN GLYCOSYLATED A1C: CPT

## 2019-03-22 PROCEDURE — 86706 HEP B SURFACE ANTIBODY: CPT

## 2019-03-22 PROCEDURE — 74011250636 HC RX REV CODE- 250/636: Performed by: INTERNAL MEDICINE

## 2019-03-22 PROCEDURE — 80061 LIPID PANEL: CPT

## 2019-03-22 PROCEDURE — 36415 COLL VENOUS BLD VENIPUNCTURE: CPT

## 2019-03-22 PROCEDURE — 82962 GLUCOSE BLOOD TEST: CPT

## 2019-03-22 PROCEDURE — 97116 GAIT TRAINING THERAPY: CPT

## 2019-03-22 PROCEDURE — 5A1D70Z PERFORMANCE OF URINARY FILTRATION, INTERMITTENT, LESS THAN 6 HOURS PER DAY: ICD-10-PCS | Performed by: INTERNAL MEDICINE

## 2019-03-22 PROCEDURE — 65270000029 HC RM PRIVATE

## 2019-03-22 PROCEDURE — 87340 HEPATITIS B SURFACE AG IA: CPT

## 2019-03-22 RX ORDER — HYDRALAZINE HYDROCHLORIDE 25 MG/1
25 TABLET, FILM COATED ORAL ONCE
Status: DISCONTINUED | OUTPATIENT
Start: 2019-03-22 | End: 2019-03-23 | Stop reason: HOSPADM

## 2019-03-22 RX ORDER — TIZANIDINE 4 MG/1
4 TABLET ORAL
Qty: 15 TAB | Refills: 0 | Status: SHIPPED | OUTPATIENT
Start: 2019-03-22 | End: 2020-02-23

## 2019-03-22 RX ORDER — SODIUM CHLORIDE 9 MG/ML
250 INJECTION, SOLUTION INTRAVENOUS
Status: DISCONTINUED | OUTPATIENT
Start: 2019-03-22 | End: 2019-03-23 | Stop reason: HOSPADM

## 2019-03-22 RX ORDER — ACETAMINOPHEN 325 MG/1
650 TABLET ORAL
Status: DISCONTINUED | OUTPATIENT
Start: 2019-03-22 | End: 2019-03-23 | Stop reason: HOSPADM

## 2019-03-22 RX ORDER — PREDNISONE 10 MG/1
TABLET ORAL
Qty: 36 TAB | Refills: 0 | Status: SHIPPED | OUTPATIENT
Start: 2019-03-22 | End: 2019-03-31

## 2019-03-22 RX ORDER — OXYCODONE AND ACETAMINOPHEN 5; 325 MG/1; MG/1
1-2 TABLET ORAL
Status: DISCONTINUED | OUTPATIENT
Start: 2019-03-22 | End: 2019-03-23 | Stop reason: HOSPADM

## 2019-03-22 RX ADMIN — DUREZOL 1 DROP: 0.5 EMULSION OPHTHALMIC at 09:07

## 2019-03-22 RX ADMIN — METHYLPREDNISOLONE SODIUM SUCCINATE 60 MG: 40 INJECTION, POWDER, FOR SOLUTION INTRAMUSCULAR; INTRAVENOUS at 14:27

## 2019-03-22 RX ADMIN — ATORVASTATIN CALCIUM 40 MG: 40 TABLET, FILM COATED ORAL at 09:08

## 2019-03-22 RX ADMIN — HEPARIN SODIUM 5000 UNITS: 5000 INJECTION INTRAVENOUS; SUBCUTANEOUS at 14:28

## 2019-03-22 RX ADMIN — DOCUSATE SODIUM 100 MG: 100 CAPSULE, LIQUID FILLED ORAL at 09:08

## 2019-03-22 RX ADMIN — THERA TABS 1 TABLET: TAB at 09:08

## 2019-03-22 RX ADMIN — METHYLPREDNISOLONE SODIUM SUCCINATE 60 MG: 40 INJECTION, POWDER, FOR SOLUTION INTRAMUSCULAR; INTRAVENOUS at 06:45

## 2019-03-22 RX ADMIN — TIMOLOL MALEATE 1 DROP: 5 SOLUTION OPHTHALMIC at 09:07

## 2019-03-22 RX ADMIN — NIFEDIPINE 60 MG: 60 TABLET, FILM COATED, EXTENDED RELEASE ORAL at 14:29

## 2019-03-22 RX ADMIN — Medication 10 ML: at 06:45

## 2019-03-22 RX ADMIN — ISOSORBIDE DINITRATE 20 MG: 20 TABLET ORAL at 14:30

## 2019-03-22 RX ADMIN — ASPIRIN 81 MG: 81 TABLET, COATED ORAL at 09:08

## 2019-03-22 RX ADMIN — PANTOPRAZOLE SODIUM 40 MG: 40 TABLET, DELAYED RELEASE ORAL at 09:08

## 2019-03-22 RX ADMIN — CARVEDILOL 25 MG: 25 TABLET, FILM COATED ORAL at 14:29

## 2019-03-22 RX ADMIN — CLONIDINE HYDROCHLORIDE 0.1 MG: 0.1 TABLET ORAL at 14:29

## 2019-03-22 RX ADMIN — HEPARIN SODIUM 5000 UNITS: 5000 INJECTION INTRAVENOUS; SUBCUTANEOUS at 06:44

## 2019-03-22 NOTE — ROUTINE PROCESS
Bedside and Verbal shift change report given to [employer] (oncoming nurse) by RODO Canales RN (offgoing nurse). Report given with SBAR, Kardex, MAR and Recent Results.

## 2019-03-22 NOTE — ROUTINE PROCESS
TRANSFER - OUT REPORT: 
 
Verbal report given to Ansley Dial RN(name) on Ashanti Cox  being transferred to Dialysis(unit) for routine progression of care Report consisted of patients Situation, Background, Assessment and  
Recommendations(SBAR). Information from the following report(s) SBAR, Kardex and Recent Results was reviewed with the receiving nurse. Lines:  
Peripheral IV 03/21/19 Right Antecubital (Active) Site Assessment Clean, dry, & intact 3/21/2019  8:03 PM  
Phlebitis Assessment 0 3/21/2019  8:03 PM  
Infiltration Assessment 0 3/21/2019  8:03 PM  
Dressing Status Occlusive 3/21/2019  8:03 PM  
Dressing Type Transparent 3/21/2019  8:03 PM  
Hub Color/Line Status Pink;Flushed;Patent 3/21/2019  8:03 PM  
Action Taken Open ports on tubing capped 3/21/2019  8:03 PM  
Alcohol Cap Used Yes 3/21/2019  8:03 PM  
  
 
Opportunity for questions and clarification was provided. Patient transported with: 
 [employer]

## 2019-03-22 NOTE — PROGRESS NOTES
Patient seen and examined independently. Ancillary data reviewed. Case discussed with APC. Agree with APC note with the following additional comments: Pt states right sided weakness has improved and he is able to walk. Cause for transient weakness unclear. MRI lumbar reveiwed by spinal surgeon no indication for surgical intervention, will need PT. Case also discussed w/ in house neurologist. Overall impression is that cause for transient leg cramping followed by weakness unlikely to be CNS event/insult. Will obtain MRI brain for comprehensive eval to be done today before dc if possible, if not can be followed up in outpt setting. Pt to have f/u w/ neurology post d/c. Cont asa, statin.

## 2019-03-22 NOTE — PROGRESS NOTES
OT order received and chart reviewed. Pt not seen for skilled OT as pt is currently off-unit was off-unit at 1017. Made second attempt at [id number] [id number], and pt continues to still be off-unit at this time. Will f/u at a later time as pt's schedule allows.  
 
Thank you for this referral. 
Kim Elam MS, OTR/L

## 2019-03-22 NOTE — PROGRESS NOTES
RENAL DAILY PROGRESS NOTE 68y M with PMH HTN, DM, ESRD admitted for right leg weakness, seen for ESRD managment Subjective:  
 
 
Complaint: 
Examined during HD. no nausea, vomiting, chest pain, short of breath, cough, seizure. IMPRESSION:  
· ESRD, MWF 
· Access; + thrill over fistula · Anemia , Hb at 12, no need for epo · Hyperphosphatemia, on binders · Secondary hyperparathyroid, on calcitriol · HTN 
· Right leg weakness PLAN:  
 At 11:26 AM on 3/22/2019, I saw and examined patient during hemodialysis treatment. The patient was receiving hemodialysis for treatment of  renal failure. I have also reviewed vital signs, intake and output, lab results and recent events, and agreed with today's dialysis order. · Adjust meds per ESRD status. HD rounding Blood pressure 173/88, pulse 83, temperature 97.8 °F (36.6 °C), temperature source Oral, resp. rate 16, height 5' 11\" (1.803 m), weight 78.7 kg (173 lb 9.6 oz), SpO2 97 %. Temp (24hrs), Av °F (36.7 °C), Min:97.5 °F (36.4 °C), Max:98.7 °F (37.1 °C) Blood Pressure: BP: 173/88 Pulse: Pulse (Heart Rate): 83 Temp:  Temp: 97.8 °F (36.6 °C) Artificial Kidney Dialyzer/Set Up Inspection: [employer]  
hours Heparin Bolus Blood flow rate Blood Flow Rate (ml/min): 400 ml/min Dialysate rate Arterial Access Pressure Arterial Access Pressure (mmHg): -150 Venous Return Pressure Venous Return Pressure (mmHg): 250 Ultrafiltration Rate Goal/Amount of Fluid to Remove (mL): 2000 mL Current Facility-Administered Medications Medication Dose Route Frequency  acetaminophen (TYLENOL) tablet 650 mg  650 mg Oral Q4H PRN  
 oxyCODONE-acetaminophen (PERCOCET) 5-325 mg per tablet 1-2 Tab  1-2 Tab Oral Q6H PRN  
 0.9% sodium chloride infusion 250 mL  250 mL IntraVENous DIALYSIS PRN  
 carvedilol (COREG) tablet 25 mg  25 mg Oral BID  therapeutic multivitamin (THERAGRAN) tablet 1 Tab  1 Tab Oral DAILY  aspirin delayed-release tablet 81 mg  81 mg Oral DAILY  NIFEdipine ER (PROCARDIA XL) tablet 60 mg  60 mg Oral DAILY  timolol (TIMOPTIC) 0.5 % ophthalmic solution 1 Drop  1 Drop Both Eyes BID  cloNIDine HCl (CATAPRES) tablet 0.1 mg  0.1 mg Oral BID  docusate sodium (COLACE) capsule 100 mg  100 mg Oral BID  isosorbide dinitrate (ISORDIL) tablet 20 mg  20 mg Oral BID  pantoprazole (PROTONIX) tablet 40 mg  40 mg Oral ACB  difluprednate (DUREZOL) 0.05 % ophthalmic emulsion 1 Drop  1 Drop Left Eye TID  ferric citrate (AURYXIA) tablet 210 mg (Patient Supplied)  210 mg Oral TID WITH MEALS  cinacalcet tab 60 mg  60 mg Oral QHS  atorvastatin (LIPITOR) tablet 40 mg  40 mg Oral DAILY  methylPREDNISolone (PF) (SOLU-MEDROL) injection 60 mg  60 mg IntraVENous Q8H  
 sodium chloride (NS) flush 5-40 mL  5-40 mL IntraVENous Q8H  
 sodium chloride (NS) flush 5-40 mL  5-40 mL IntraVENous PRN  
 heparin (porcine) injection 5,000 Units  5,000 Units SubCUTAneous Q8H  
 tiZANidine (ZANAFLEX) tablet 4 mg  4 mg Oral TID PRN Review of Symptoms: comprehensive ROS negative except above. Objective:  
 
Patient Vitals for the past 24 hrs: 
 Temp Pulse Resp BP SpO2  
03/22/19 1115  83  173/88   
03/22/19 1100  81  (!) 176/94   
03/22/19 1045  80  (!) 174/92   
03/22/19 1030  81  171/87   
03/22/19 1015  80  147/77   
03/22/19 1000  79  169/84   
03/22/19 0945  79  170/85   
03/22/19 0930  81  177/88   
03/22/19 0920 97.8 °F (36.6 °C) 81 16 173/86   
03/22/19 0751 98.7 °F (37.1 °C) 81 18 150/77 97 % 03/22/19 0411 98.1 °F (36.7 °C) 70 17 125/75 96 % 03/22/19 0005 98.2 °F (36.8 °C) 82 18 143/74 94 % 03/21/19 2233  80  153/74   
03/21/19 2002 97.5 °F (36.4 °C) 76 18 158/78 96 % 03/21/19 1800 97.7 °F (36.5 °C) 69 15 152/76 96 % 03/21/19 1700  67 15 130/60 96 % 03/21/19 1645  68 19 138/67 95 % 03/21/19 1630  70 19 143/69 97 % 03/21/19 1617  70 17 153/69 95 % 03/21/19 1557 97.7 °F (36.5 °C) 70 18 168/71 98 % Weight change: No intake/output data recorded. No intake or output data in the 24 hours ending 03/22/19 1125 Physical Exam:  
General: comfortable, no acute distress HEENT sclera anicteric, supple neck, no thyromegaly CVS: S1S2 heard,  no rub RS: + air entry b/l, Abd: Soft, Non tender, Not distended, Positive bowel sounds, no organomegaly, no CVA / supra pubic tenderness Neuro: non focal, awake, alert , CN II-XII are grossly intact Extrm: no edema, no cyanosis, clubbing Skin: no visible  Rash Musculoskeletal: No gross joints or bone deformities  
  
Procedures/imaging: see electronic medical records for all procedures, Xrays and details which were not copied into this note but were reviewed prior to creation of Plan Data Review:  
 
LABS:  
Hematology:  
Recent Labs  
  03/21/19 
1600 WBC 5.8 HGB 12.0*  
HCT 35.5* Chemistry:  
Recent Labs  
  03/21/19 
1600 BUN 32* CREA 8.04* CA 9.6 K 4.0  
 CL 94* CO2 33* * Procedures/imaging: see electronic medical records for all procedures, Xrays and details which were not copied into this note but were reviewed prior to creation of Plan Assessment & Plan: As above Clyde Lambert MD 
3/22/2019 
11:25 AM

## 2019-03-22 NOTE — DIALYSIS
[facility] ACUTE HEMODIALYSIS FLOW SHEET-RN note [x] Vandanaita Consent Verified CATHETER ACCESS: [x]N/A   []Right   []Left   []IJ     []Fem [] First use X-ray verified     []Tunnel                [] Non Tunneled []No S/S infection  []Redness  []Drainage []Cultured []Swelling []Pain []Medical Aseptic Prep Utilized   []Dressing Changed  [] Biopatch  Date:      
[]Clotted   [x]Patent   Flows: []Good  []Poor  []Reversed If access problem,  notified: []Yes    []N/A  Date:        
 
GRAFT/FISTULA ACCESS:  []N/A     []Right     [x]Left     [x]UE     []LE [x]AVG   []AVF        []Buttonhole    [x]Medical Aseptic Prep Utilized [x]No S/S infection  []Redness  []Drainage []Cultured []Swelling []Pain Bruit:   [x] Strong    [] Weak       Thrill :   [x] Strong    [] Weak Needle Gauge: 15   Length: 1 If access problem,  notified: []Yes     []N/A  Date:       
Please describe access if present and not used:  
 
 
GENERAL ASSESSMENT:   
LUNGS:  Rate  SaO2%        [] N/A    [x] Clear  [] Coarse  [] Crackles  [] Wheezing 
      [] Diminished     Location : []RLL   []LLL    []RUL  []EMELY Cough: []Productive  []Dry  [x]N/A   Respirations:  [x]Easy  []Labored Therapy:  [x]RA  []NC  l/min    Mask: []NRB []Venti       O2% []Ventilator  []Intubated  [] Trach  [] BiPaP CARDIAC: [x]Regular      [] Irregular   [] Pericardial Rub  [] JVD []  Monitored  [] Bedside  [] Remotely monitored [] N/A  Rhythm: EDEMA: [] None  [x]Generalized  [] Pitting [] 1    [] 2    [] 3    [] 4 [] Facial  [] Pedal  []  UE  [] LE  
SKIN:   [x] Warm  [] Hot     [] Cold   [x] Dry     [] Pale   [] Diaphoretic    
             [] Flushed  [] Jaundiced  [] Cyanotic  [] Rash  [] Weeping LOC:    [x] Alert      [x]Oriented:    [x] Person     [x] Place  [x]Time 
             [] Confused  [] Lethargic  [] Medicated  [] Non-responsive GI / ABDOMEN:  [] Flat    [] Distended    [x] Soft    [] Firm   []  Obese 
                           [] Diarrhea  [x] Bowel Sounds  [] Nausea  [] Vomiting  / URINE ASSESSMENT:[] Voiding   [x] Oliguria  [] Anuria   []  [place] [] Incontinent    []  Incontinent Brief      []  Bathroom Privileges PAIN: [x] 0 []1  []2   []3   []4   []5   []6   []7   []8   []9   []10 Scale 0-10  Action/Follow Up: MOBILITY:  [] Amb    [] Amb/Assist    [x] Bed    [] Wheelchair  [] Stretcher ALLERGIES:   [x] NKA Code Status:  [x] Full Code  [] DNR  [] Other PRIMARY NURSE REPORT: First initial/Last name/Title Pre Dialysis: Dequan Valentin RN    Time: 0900 EDUCATION:   
[x] Patient [] Other         Knowledge Basis: []None [x]Minimal [] Substantial  
Barriers to learning  [x]N/A  
[] Access Care     [] S&S of infection     [] Fluid Management     []K+     [x]Procedural   
[]Albumin     [] Medications     [] Tx Options     [] Transplant     [] Diet     [] Other Teaching Tools:  [x] Explain  [] Demo  [] Handouts [] Video Patient response:   [x] Verbalized understanding  [] Teach back  [] Return demonstration [] Requires follow up Inappropriate due to         
 
[x] Time Out/Safety Check  [x]Extracorporeal Circuit Tested for integrity TREATMENT INITIATION  with Dialysis Precautions:  
[x] All Connections Secured                 [x] Saline Line Double Clamped  
[x] Venous Parameters Set                  [x] Arterial Parameters Set [x] Prime Given  250ml                          [x]Air Foam Detector Engaged Treatment Initiation Note:pt arrived in stable condition via bed. AVG accessed and treatment initiated without difficulty. During Treatment Notes:dr paiz at bedside. Medication Dose Volume Route Initials Dialyzer Cleared: [] Good [x] Fair  [] Poor Blood processed:  88.9 L 
UF Removed  2000 Ml Post Wt: 76.7    kg 
 POst BP:   166/80       Pulse: 87      Respirations: 16  Temperature: 98 
  
                              Post Tx Vascular Access: AVF/AVG: Bleeding stopped Art 5 min. Selvin. 5 Min Catheter: Locking solution: Heparin 1:1000 Art. Selvin. N/A Post Assessment:  
  
                              Skin:  [x] Warm  [x] Dry [] Diaphoretic    [] Flushed  [] Pale [] Cyanotic [facility] Signatures Title Initials  Time Lungs: [x] Clear    [] Course  [] Crackles  [] Wheezing [] Diminished RN   Cardiac: [x] Regular   [] Irregular   [] Monitor  [] N/A  Rhythm:      
    Edema:  [x] None    [] General     [] Facial   [] Pedal    [] UE    [] LE  
    Pain: [x]0  []1  []2   []3  []4   []5   []6   []7   []8   []9   []10 Post Treatment Note:HD well tolerated. 2L UF removed. POST TREATMENT PRIMARY NURSE HANDOFF REPORT:  
 
First initial/Last name/Title Post Dialysis: Ian Bal RN Time:  9039 Abbreviations: AVG-arterial venous graft, AVF-arterial venous fistula, IJ-Internal Jugular, Subcl-Subclavian, Fem-Femoral, Tx-treatment, AP/HR-apical heart rate, DFR-dialysate flow rate, BFR-blood flow rate, AP-arterial pressure, -venous pressure, UF-ultrafiltrate, TMP-transmembrane pressure, Selvin-Venous, Art-Arterial, RO-Reverse Osmosis

## 2019-03-22 NOTE — CONSULTS
Consult Note  Consult requested by: Dr. Damián Alvarezbib is a 70 y.o. male [address] who is being seen on consult for ESRD management. Chief Complaint   Patient presents with    Numbness     Admission diagnosis: right leg numbness    68y M with PMH HTN, DM, ESRD admitted for right leg weakness, seen for ESRD managment  Impression & Plan:   IMPRESSION:   ESRD, MWF  Access; + thrill over fistula  Anemia , Hb at 12, no need for epo  Hyperphosphatemia, on binders  Secondary hyperparathyroid, on calcitriol  HTN  Right leg weakness   PLAN:    Will arrange HD today. Adjust meds per ESRD status. Discussed with Dr. Ke Villalobos. Thank you very much for allowing me to participate in the care of this patient. We will continue to monitor with you and make recommendations as needed. HPI:  68y M with PMH HTN, ESRD, DM, came to ER with complaining weakness in his right leg. He was evaluated by neurology and acute CVA was ruled out. Seen this morning for ESRD management. Mr. Deanna Ro admit his leg weakness is better this morning. Denies for any chest pain, short of breath, nausea, vomiting.    Past Medical History:   Diagnosis Date    Acute hearing loss of right ear     Acute urinary retention 8/15    Anemia of chronic disease 2010    saw Dr. Aime Andersen in past    Asbestosis(501)     Chronic edema     Chronic kidney disease     HD- W-W-F    Colon adenoma     Dr. Ranjith Morales 2009, 2/15, 4/18    Diabetes mellitus ([facility]     w neuropathy and retinopathy Dr. Roverto OLEA (degenerative joint disease)     Dyslipidemia     Erectile dysfunction     ESRD on hemodialysis ([facility]     Dr. Estella Sierra, M-W-F    FHx: heart disease     GERD (gastroesophageal reflux disease)     Glaucoma     Dr. Dali Harper failure [facility]) 2011    HTN (hypertension)     Kidney stone     Sarcoidosis     Sleep apnea     NOT using cpap (states resolved)    Thyroid disease       Past Surgical History:   Procedure Laterality Date    COLONOSCOPY N/A 4/10/2018    Dr. Pleasant Closs adenoma 2009, 2/15; adenoma and hyperplastic 4/10/18    HX CATARACT REMOVAL      HX ORTHOPAEDIC  01/2018    left wrist and humerus fx Dr Pressley [place]  9/15    cystoscopy negative Dr Brayan Orourke DX [id number] S&I N/A 8/1/2018    Fistulogram Left performed by Bryon Cid MD at [facility] CATH LAB   [facility] [address] TEST THALLIUM STUDY  2009    negative    VASCULAR SURGERY PROCEDURE UNLIST  3/14    nl BALDEMAR bilat    VASCULAR SURGERY PROCEDURE UNLIST      LEFT ARM GRAFT FOR HD       Social History     Socioeconomic History    Marital status:      Spouse name: Not on file    Number of children: 3    Years of education: Not on file    Highest education level: Not on file   Occupational History    Occupation: [employer]   Social Needs    Financial resource strain: Not on file    Food insecurity:     Worry: Not on file     Inability: Not on file    Transportation needs:     Medical: Not on file     Non-medical: Not on file   Tobacco Use    Smoking status: Never Smoker    Smokeless tobacco: Never Used   Substance and Sexual Activity    Alcohol use: No    Drug use: No    Sexual activity: Never   Lifestyle    Physical activity:     Days per week: Not on file     Minutes per session: Not on file    Stress: Not on file   Relationships    Social connections:     Talks on phone: Not on file     Gets together: Not on file     Attends Baptism service: Not on file     Active member of club or organization: Not on file     Attends meetings of clubs or organizations: Not on file     Relationship status: Not on file    Intimate partner violence:     Fear of current or ex partner: Not on file     Emotionally abused: Not on file     Physically abused: Not on file     Forced sexual activity: Not on file   Other Topics Concern    Not on file   Social History Narrative    Not on file       Family History   Problem Relation Age of Onset    Diabetes Father     Heart Disease Sister     Diabetes Brother     Heart Disease Brother     Diabetes Brother     Diabetes Sister      No Known Allergies     Home Medications:     Prior to Admission Medications   Prescriptions Last Dose Informant Patient Reported? Taking? AURYXIA 210 mg iron tablet 3/20/2019 at Unknown time  Yes Yes   Sig: Take 210 mg by mouth three (3) times daily (with meals). DUREZOL 0.05 % ophthalmic emulsion 3/20/2019 at Unknown time  Yes Yes   Sig: INSTILL 1 DROP LEFT EYE TID AS DIRECTED   MULTIVITAMIN PO 3/14/2019 at Unknown time  Yes Yes   Sig: Take  by mouth daily. NIFEdipine XL (PROCARDIA-XL) 90 mg tablet 3/20/2019 at Unknown time  Yes Yes   Sig: Take 60 mg by mouth daily. aspirin delayed-release 81 mg tablet 3/20/2019 at Unknown time  Yes Yes   Sig: Take 81 mg by mouth daily. atorvastatin (LIPITOR) 40 mg tablet 3/20/2019 at Unknown time  No Yes   Sig: Take 1 Tab by mouth daily. carvedilol (COREG) 25 mg tablet 3/20/2019 at Unknown time  Yes Yes   Sig: Take 25 mg by mouth two (2) times a day. cinacalcet (SENSIPAR) 60 mg tab 3/20/2019 at Unknown time  Yes Yes   Sig: Take 60 mg by mouth nightly. cinnamon bark (CINNAMON) 500 mg cap 3/20/2019 at Unknown time  Yes Yes   Si mg.   cloNIDine HCl (CATAPRES) 0.1 mg tablet 3/20/2019 at Unknown time  Yes Yes   Sig: Take  by mouth two (2) times a day. docusate sodium (COLACE) 100 mg capsule 3/20/2019 at Unknown time  Yes Yes   Sig: Take 100 mg by mouth two (2) times a day. isosorbide dinitrate (ISORDIL) 20 mg tablet 3/20/2019 at Unknown time  No Yes   Sig: Take 1 Tab by mouth two (2) times a day. pantoprazole (PROTONIX) 40 mg tablet 3/20/2019 at Unknown time  No Yes   Sig: TAKE 1 TABLET BY MOUTH TWICE DAILY   timolol (TIMOPTIC) 0.5 % ophthalmic solution Not Taking at Unknown time  Yes No   Sig: Administer 1 Drop to both eyes two (2) times a day.       Facility-Administered Medications: None       Current Facility-Administered Medications   Medication Dose Route Frequency    acetaminophen (TYLENOL) tablet 650 mg  650 mg Oral Q4H PRN    oxyCODONE-acetaminophen (PERCOCET) 5-325 mg per tablet 1-2 Tab  1-2 Tab Oral Q6H PRN    0.9% sodium chloride infusion 250 mL  250 mL IntraVENous DIALYSIS PRN    carvedilol (COREG) tablet 25 mg  25 mg Oral BID    therapeutic multivitamin (THERAGRAN) tablet 1 Tab  1 Tab Oral DAILY    aspirin delayed-release tablet 81 mg  81 mg Oral DAILY    NIFEdipine ER (PROCARDIA XL) tablet 60 mg  60 mg Oral DAILY    timolol (TIMOPTIC) 0.5 % ophthalmic solution 1 Drop  1 Drop Both Eyes BID    cloNIDine HCl (CATAPRES) tablet 0.1 mg  0.1 mg Oral BID    docusate sodium (COLACE) capsule 100 mg  100 mg Oral BID    isosorbide dinitrate (ISORDIL) tablet 20 mg  20 mg Oral BID    pantoprazole (PROTONIX) tablet 40 mg  40 mg Oral ACB    difluprednate (DUREZOL) 0.05 % ophthalmic emulsion 1 Drop  1 Drop Left Eye TID    ferric citrate (AURYXIA) tablet 210 mg (Patient Supplied)  210 mg Oral TID WITH MEALS    cinacalcet tab 60 mg  60 mg Oral QHS    atorvastatin (LIPITOR) tablet 40 mg  40 mg Oral DAILY    methylPREDNISolone (PF) (SOLU-MEDROL) injection 60 mg  60 mg IntraVENous Q8H    sodium chloride (NS) flush 5-40 mL  5-40 mL IntraVENous Q8H    sodium chloride (NS) flush 5-40 mL  5-40 mL IntraVENous PRN    heparin (porcine) injection 5,000 Units  5,000 Units SubCUTAneous Q8H    tiZANidine (ZANAFLEX) tablet 4 mg  4 mg Oral TID PRN       Review of Systems:      Complete 10-point review of systems were obtained and discussed in length  with the patient. Complete review of systems was negative/unremarkable  except as mentioned in HPI section.   Data Review:    Labs: Results:       Chemistry Recent Labs     03/21/19  1600   *      K 4.0   CL 94*   CO2 33*   BUN 32*   CREA 8.04*   CA 9.6   AGAP 9   BUCR 4*      CBC w/Diff Recent Labs     03/21/19  1600   WBC 5. 8   RBC 3.76*   HGB 12.0*   HCT 35.5*      GRANS 67   LYMPH 16*   EOS 2      Coagulation Recent Labs     03/21/19  1600   PTP 13.3   INR 1.0   APTT 38.6*       Iron/Ferritin No results for input(s): IRON in the last 72 hours. No lab exists for component: TIBCCALC   BNP No results for input(s): BNPP in the last 72 hours. Cardiac Enzymes No results for input(s): CPK, CKND1, NAUN in the last 72 hours. No lab exists for component: CKRMB, TROIP   Liver Enzymes No results for input(s): TP, ALB, TBIL, AP, SGOT, GPT in the last 72 hours.     No lab exists for component: DBIL   Thyroid Studies No results found for: T4, T3U, TSH, TSHEXT      EKG: sinus     Physical Assessment:     Visit Vitals  /87   Pulse 81   Temp 97.8 °F (36.6 °C) (Oral)   Resp 16   Ht 5' 11\" (1.803 m)   Wt 78.7 kg (173 lb 9.6 oz)   SpO2 97%   BMI 24.21 kg/m²     Weight change:   No intake or output data in the 24 hours ending 03/22/19 1111  Physical Exam:   General: comfortable, no acute distress   HEENT sclera anicteric, supple neck, no thyromegaly  CVS: S1S2 heard,  no rub  RS: + air entry b/l,   Abd: Soft, Non tender, Not distended, Positive bowel sounds, no organomegaly, no CVA / supra pubic tenderness  Neuro: non focal, awake, alert , CN II-XII are grossly intact  Extrm: no edema, no cyanosis, clubbing   Skin: no visible  Rash  Musculoskeletal: No gross joints or bone deformities     Procedures/imaging: see electronic medical records for all procedures, Xrays and details which were not copied into this note but were reviewed prior to creation of Plan             Ramya Michaud MD  March 22, 2019  [facility]  Office [phone]

## 2019-03-22 NOTE — PROGRESS NOTES
Problem: Mobility Impaired (Adult and Pediatric) Goal: *Acute Goals and Plan of Care (Insert Text) Description Physical Therapy Goals Initiated 3/22/2019 and to be accomplished within 7 day(s) 1. Patient will transfer from bed to chair and chair to bed with modified independence using the least restrictive device. 2.  Patient will perform sit to stand with modified independence. 3.  Patient will ambulate with modified independence for 150 feet with the least restrictive device. 4.  Patient will ascend/descend 3 stairs with bilateral handrail(s) with supervision/set-up. Outcome: Progressing Towards Goal 
 
PHYSICAL THERAPY EVALUATION Patient: Elke James (6 y.o. male) Date: 3/22/2019 Primary Diagnosis: Leg weakness [R29.898] Right leg weakness [R29.898] Precautions: fakk OBJECTIVE/ASSESSMENT : 
Based on the objective data described below, the patient presents with impaired functional mobility including bed mobility, transfers, ambulation, and general activity tolerance following admission for R LE weakness. Patient presented today semi-reclined in bed, just back from dialysis. Patient was able to perform SLR on bilateral LEs and states his symptoms of R LE weakness that initially brought him to the ED are almost gone. Patient has no loss of sensation or pain in R leg. Patient transferred supine-sit independently. Pt displayed good sitting balance on EOB. Pt was able to perform sit-stand transfer with CGA and presented with LOB upon initial standing. Pt ambulated 10 feet forward with RW and CGA; he stated he felt dizzy and ambulated back to bed at which time he transferred back to supine independently. Pt states this is typical of how he feels after dialysis. At conclusion of session, patient left resting comfortably in bed with call bell in reach, needs met, and nurse notified. Education:  
?         Bed mobility ? Transfers ? Ambulation ? Assistive device management ? Stairs ? Body mechanics ? Position change ? Activity pacing/energy conservation ? Other:  
 
Patient will benefit from skilled intervention to address the above impairments. Patient?s rehabilitation potential is considered to be Good Factors which may influence rehabilitation potential include:  
? None noted ? Mental ability/status ? Medical condition ? Home/family situation and support systems ? Safety awareness 
? Pain tolerance/management 
? Other: PLAN : 
Recommendations and Planned Interventions: 
?           Bed Mobility Training             ? Neuromuscular Re-Education ? Transfer Training                   ? Orthotic/Prosthetic Training 
? Gait Training                          ? Modalities ? Therapeutic Exercises          ? Edema Management/Control ? Therapeutic Activities            ? Patient and Family Training/Education ? Other (comment): Frequency/Duration: Patient will be followed by physical therapy 1-2 times per day, 4-7 days per week to address goals. Discharge Recommendations: Home Health Further Equipment Recommendations for Discharge: N/A  
 
SUBJECTIVE:  
Patient stated ? I just get tired after dialysis. ? OBJECTIVE DATA SUMMARY:  
 
Past Medical History:  
Diagnosis Date Acute hearing loss of right ear Acute urinary retention 8/15 Anemia of chronic disease 2010  
 saw Dr. Lisa Breaux in past  
 Asbestosis(501) Chronic edema Chronic kidney disease HD- W-W-F Colon adenoma Dr. Sophy Field 2009, 2/15, 4/18 Diabetes mellitus (HCC)   
 w neuropathy and retinopathy Dr. Alexandro OLEA (degenerative joint disease) Dyslipidemia Erectile dysfunction [facility] Dr. Amarilis Sheldon, M-W-F FHx: heart disease GERD (gastroesophageal reflux disease) Glaucoma Dr. Jesse Hood Heart failure ([facility] [address] 2011 HTN (hypertension) Kidney stone Sarcoidosis Sleep apnea NOT using cpap (states resolved) Thyroid disease Past Surgical History:  
Procedure Laterality Date COLONOSCOPY N/A 4/10/2018 Dr. Sophy Field adenoma 2009, 2/15; adenoma and hyperplastic 4/10/18 HX CATARACT REMOVAL    
 HX ORTHOPAEDIC  01/2018  
 left wrist and humerus fx Dr Dutta [place] HX UROLOGICAL  9/15  
 cystoscopy negative Dr Noe Arshad [facility]IALYS[facility] DX ANGRPH FLUOR S&I N/A 8/1/2018 Fistulogram Left performed by Jose Carolina MD at [facility] LAB  
 STRESS TEST THALLIUM STUDY  2009  
 negative VASCULAR SURGERY PROCEDURE UNLIST  3/14  
 nl BALDEMAR bilat VASCULAR SURGERY PROCEDURE UNLIST    
 LEFT ARM GRAFT FOR HD Barriers to Learning/Limitations: None Compensate with: N/A Prior Level of Function/Home Situation: Pt lives with wife in a 1 story home with 3 steps or ramp entry. Pt ambulated independently in his home and short community distances with a SPC. Pt's L LE was weaker than R from a prior injury. Home Situation Home Environment: Private residence # Steps to Enter: 3 Rails to Enter: Yes Hand Rails : Right Wheelchair Ramp: Yes One/Two Story Residence: One story Living Alone: No 
Support Systems: Child(mimi), Spouse/Significant Other/Partner Patient Expects to be Discharged to[de-identified] Private residence Current DME Used/Available at Home: Cane, straight, Walker, rolling Critical Behavior: 
Neurologic State: Alert Psychosocial 
Purposeful Interaction: Yes Pt Identified Daily Priority: Clinical issues (comment) Yosi Process: Establish trust;Teaching/learning; Attend basic human needs Caring Interventions: Reassure; Therapeutic modalities Reassure: Therapeutic listening; Informing;Support family;Caring rounds Therapeutic Modalities: Humor; Intentional therapeutic touch Strength:   
 Strength: Within functional limits(B LEs) Tone & Sensation:  
Tone: Normal 
Sensation: Intact Range Of Motion: 
AROM: Within functional limits(B LEs) Functional Mobility: 
Bed Mobility: 
Supine to Sit: Independent Sit to Supine: Independent Transfers: 
Sit to Stand: Contact guard assistance Stand to Sit: Contact guard assistance Balance:  
Sitting: Intact Standing: Impaired; With support Standing - Static: Good Standing - Dynamic : Fair Ambulation/Gait Training: 
Distance (ft): 20 Feet (ft) Assistive Device: Walker, rolling Ambulation - Level of Assistance: Contact guard assistance Pain: 
Pre session: 0 Post session: 0 Activity Tolerance:  
Fair Please refer to the flowsheet for vital signs taken during this treatment. After treatment:  
? Patient left in no apparent distress sitting up in chair ? Patient left sitting on EOB ? Patient left in no apparent distress in bed ? Patient declined to be OOB at this time due to  
? Call bell left within reach ? Nursing notified([place]) ? Caregiver present ? Bed alarm activated ? SCDs in place COMMUNICATION/EDUCATION:  
?         Fall prevention education was provided and the patient/caregiver indicated understanding. ? Patient/family have participated as able in goal setting and plan of care. ?         Patient/family agree to work toward stated goals and plan of care. ?         Patient understands intent and goals of therapy, but is neutral about his/her participation. ? Patient is unable to participate in goal setting and plan of care. Thank you for this referral. 
Mónica Avalos, PT Time Calculation: 23 mins Eval Complexity: History: MEDIUM  Complexity : 1-2 comorbidities / personal factors will impact the outcome/ POC Exam:MEDIUM Complexity : 3 Standardized tests and measures addressing body structure, function, activity limitation and / or participation in recreation  Presentation: MEDIUM Complexity : Evolving with changing characteristics  Clinical Decision Making:Medium Complexity    Overall Complexity:MEDIUM

## 2019-03-22 NOTE — PROGRESS NOTES
conducted an initial consultation and Spiritual Assessment for Ana Mercado, who is a 70 y. o.,male. Patients Primary Language is: [place]. According to the patients EMR Mormonism Affiliation is: [place]. The reason the Patient came to the hospital is:  
Patient Active Problem List  
 Diagnosis Date Noted  ESRD (end stage renal disease) [facility] 08/01/2018 Priority: 1 - One  Leg weakness 03/21/2019  Right leg weakness 03/21/2019  Type 2 diabetes with nephropathy ([facility] 02/17/2019  Diabetic retinopathy ([facility] 01/24/2016  Type 2 diabetes, controlled, with neuropathy ([facility] Dr Sebastian Booth 01/24/2016  Advance directive discussed with patient 09/24/2015  End stage renal disease on dialysis ([facility] 08/13/2015  Primary hypertension 08/04/2015  GERD without esophagitis 08/04/2015  Arthritis, degenerative 08/04/2015  Dyslipidemia 09/12/2013  Sarcoidosis  Colon adenoma 2/15 Dr. Aaliyah Faust 11/21/2011  Erectile dysfunction 11/21/2011  Sleep apnea Dr. Israel Gupta The  provided the following Interventions: 
Initiated a relationship of care and support. Explored issues of mariajose, belief, spirituality and Hoahaoism/ritual needs while hospitalized. Listened empathically. Provided chaplaincy education. Provided information about Spiritual Care Services. Offered prayer and assurance of continued prayers on patient's behalf. Chart reviewed. The following outcomes where achieved: 
Patient shared limited information about both their medical narrative and spiritual journey/beliefs. Patient processed feeling about current hospitalization. Patient expressed gratitude for 's visit. Assessment: 
Patient does not have any Hoahaoism/cultural needs that will affect patients preferences in health care. There are no spiritual or Hoahaoism issues which require intervention at this time. Plan: Chaplains will continue to follow and will provide pastoral care on an as needed/requested basis.  recommends bedside caregivers page  on duty if patient shows signs of acute spiritual or emotional distress. [facility] Spiritual Care  
[phone]

## 2019-03-22 NOTE — DIALYSIS
ACUTE HEMODIALYSIS FLOW SHEET- PCT note HEMODIALYSIS ORDERS: Physician: Kirill Blanco Dialyzer: revaclear   Duration: 4 hr  BFR: 400   DFR: 800 Dialysate:  Temp 36-37 K+   2    Ca+  2.5 Na 138  Bicarb 35 Weight:  78.7 kg    Patient Chart [x]     Unable to Obtain []   Dry weight/UF Goal: 2000 Access AVG Needle Gauge 15 Heparin []  Bolus      Units    [] Hourly       Units    [x]None Catheter locking solution N/A Pre BP:   173/86    Pulse:     81     Temperature:   97.8  Respirations: 16  Tx: NS       ml/Bolus  Other        [x] N/A Labs: Pre        Post:        [x] N/A Additional Orders(medications, blood products, hypotension management):       [x] N/A [x] Gale Consent Verified CATHETER ACCESS: [x]N/A   []Right   []Left   []IJ     []Fem [] First use X-ray verified     []Tunnel                [] Non Tunneled []No S/S infection  []Redness  []Drainage []Cultured []Swelling []Pain []Medical Aseptic Prep Utilized   []Dressing Changed  [] Biopatch  Date:      
[]Clotted   []Patent   Flows: []Good  []Poor  []Reversed If access problem,  notified: []Yes    [x]N/A  Date:        
 
GRAFT/FISTULA ACCESS:  []N/A     []Right     [x]Left     [x]UE     []LE [x]AVG   []AVF        []Buttonhole    [x]Medical Aseptic Prep Utilized [x]No S/S infection  []Redness  []Drainage []Cultured []Swelling []Pain Bruit:   [x] Strong    [] Weak       Thrill :   [x] Strong    [] Weak Needle Gauge: 15   Length: 1 inch If access problem,  notified: []Yes     [x]N/A  Date:       
Please describe access if present and not used:N/A Pre Treatment  PCT Note: 
 
  Patent and vitals stable prior to treatment. Patient states they feel fine. AVG Patent. No S/S of infection. Good bruit+thrill. Cannulation was successful. Hospital: [facility] Room # 419 [] 1st Time Acute  [] Stat  [x] Routine  [] Urgent [x] Acute Room  []  Bedside  [] ICU/CCU  [] ER  
 Isolation Precautions:  [x] Dialysis   [] Airborne   [] Contact    [] Reverse Special Considerations:         [] Blood Consent Verified [x]N/A ALLERGIES:   [x] NKA Code Status:  [x] Full Code  [] DNR  [] Other HBsAg ONLY: Date Drawn 01/31/2019         [x]Negative []Positive []Unknown HBsAb: Date 01/31/2019 [] Susceptible   [x] [id number] []Not Drawn  [] Drawn Current Labs:    Date of Labs: 03/21/2019          Today [] Results for Abdon Fulton (MRN [id number]) as of 3/22/2019 10:23 Ref. Range 3/21/2019 16:00 WBC Latest Ref Range: 4.6 - 13.2 K/uL 5.8 RBC Latest Ref Range: 4.70 - 5.50 M/uL 3.76 (L) HGB Latest Ref Range: 13.0 - 16.0 g/dL 12.0 (L) HCT Latest Ref Range: 36.0 - 48.0 % 35.5 (L) MCV Latest Ref Range: 74.0 - 97.0 FL 94.4 MCH Latest Ref Range: 24.0 - 34.0 PG 31.9 MCHC Latest Ref Range: 31.0 - 37.0 g/dL 33.8 RDW Latest Ref Range: 11.6 - 14.5 % 14.3 PLATELET Latest Ref Range: 135 - 420 K/uL 261 MPV Latest Ref Range: 9.2 - 11.8 FL 9.2 NEUTROPHILS Latest Ref Range: 40 - 73 % 67 LYMPHOCYTES Latest Ref Range: 21 - 52 % 16 (L) MONOCYTES Latest Ref Range: 3 - 10 % 14 (H) EOSINOPHILS Latest Ref Range: 0 - 5 % 2  
BASOPHILS Latest Ref Range: 0 - 2 % 1 DF Latest Units:   AUTOMATED  
ABS. NEUTROPHILS Latest Ref Range: 1.8 - 8.0 K/UL 4.0  
ABS. LYMPHOCYTES Latest Ref Range: 0.9 - 3.6 K/UL 0.9  
ABS. MONOCYTES Latest Ref Range: 0.05 - 1.2 K/UL 0.8 ABS. EOSINOPHILS Latest Ref Range: 0.0 - 0.4 K/UL 0.1 ABS. BASOPHILS Latest Ref Range: 0.0 - 0.1 K/UL 0.0 Results for Abdon Fulton (MRN [id number]) as of 3/22/2019 10:23 Ref. Range 3/21/2019 16:00 Sodium Latest Ref Range: 136 - 145 mmol/L 136 Potassium Latest Ref Range: 3.5 - 5.5 mmol/L 4.0 Chloride Latest Ref Range: 100 - 108 mmol/L 94 (L)  
CO2 Latest Ref Range: 21 - 32 mmol/L 33 (H) Anion gap Latest Ref Range: 3.0 - 18 mmol/L 9 Glucose Latest Ref Range: 74 - 99 mg/dL 105 (H) BUN Latest Ref Range: 7.0 - 18 MG/DL 32 (H) Creatinine Latest Ref Range: 0.6 - 1.3 MG/DL 8.04 (H) BUN/Creatinine ratio Latest Ref Range: 12 - 20   4 (L) Calcium Latest Ref Range: 8.5 - 10.1 MG/DL 9.6 GFR est non-AA Latest Ref Range: >60 ml/min/1.73m2 7 (L) GFR est AA Latest Ref Range: >60 ml/min/1.73m2 8 (L) Troponin-I, Qt. Latest Ref Range: 0.0 - 0.045 NG/ML 0.04 DIET: [x] Renal    [] Other     [] NPO     []  Diabetic RO/HEMODIALYSIS MACHINE SAFETY CHECKS  Before each treatment:    
Machine Number:                   [facility] [x] Unit Machine # 9 with centralized RO 
                                [] Portable Machine #1/RO serial # [id number] [] Portable Machine #2/RO serial # [id number] [] Portable Machine #4/RO serial # [id number] [facility] 
                                [] Portable Machine #11/RO serial # [id number] [] Portable Machine #12/RO serial # [id number] [] Portable Machine #13/RO serial #  [id number] Alarm Test:  Pass time 7492         Other:        
[x]RO/Machine Log Complete Temp    37 Dialysate: pH  7.2 Conductivity: Meter   14     HD Machine   14.1                 TCD: 13.8 Dialyzer Lot # [id number]            Blood Tubing Lot # [id number]          Saline Lot #  [id number]  
 
CHLORINE TESTING-Before each treatment and every 4 hours Total Chlorine: [x] less than 0.1 ppm  Time: 0900 4 Hr/2nd Check Time: 1300  
(if greater than 0.1 ppm from Primary then every 30 minutes from Secondary) TREATMENT INITIATION  with Dialysis Precautions: [x] All Connections Secured                 [x] Saline Line Double Clamped  
[x] Venous Parameters Set                  [x] Arterial Parameters Set [x] Prime Given 250 mL                          [x]Air Foam Detector Engaged Dialyzer Cleared: [x] Good [] Fair  [] Poor Blood processed:  88.9 L 
UF Removed  2000 Ml Post Wt: 76.7   kg POst BP:   166/80      Pulse: 87      Respirations: 16  Temperature: 98 
  
Post Tx Vascular Access: AVF/AVG: Bleeding stopped Art 5 min. Selvin. 5Min Catheter: Locking solution: N/A Post Treatment  PCT Note: 
 
 Patient and vitals stable post treatment. Fluid removed as prescribed. AVG patent. Access did not bleed for long time. Abbreviations: AVG-arterial venous graft, AVF-arterial venous fistula, IJ-Internal Jugular, Subcl-Subclavian, Fem-Femoral, Tx-treatment, AP/HR-apical heart rate, DFR-dialysate flow rate, BFR-blood flow rate, AP-arterial pressure, -venous pressure, UF-ultrafiltrate, TMP-transmembrane pressure, Selvin-Venous, Art-Arterial, RO-Reverse Osmosis

## 2019-03-22 NOTE — CONSULTS
HPI:  71 y/o male admitted yesterday with a CC of right lower extremity weakness. He describes he was walking and that his right hip down his thigh and calf felt as if he had a spasm. He had diminished control and was dragging his leg. He has a weak left leg from musculoskeletal issues and his right leg is his strong one. Symptoms resolved and have not recurred. Denied any weakness. He had twisted his back about 2 wks ago.        Social History     Socioeconomic History    Marital status:      Spouse name: Not on file    Number of children: 3    Years of education: Not on file    Highest education level: Not on file   Occupational History    Occupation: [employer]   Social Needs    Financial resource strain: Not on file    Food insecurity:     Worry: Not on file     Inability: Not on file    Transportation needs:     Medical: Not on file     Non-medical: Not on file   Tobacco Use    Smoking status: Never Smoker    Smokeless tobacco: Never Used   Substance and Sexual Activity    Alcohol use: No    Drug use: No    Sexual activity: Never   Lifestyle    Physical activity:     Days per week: Not on file     Minutes per session: Not on file    Stress: Not on file   Relationships    Social connections:     Talks on phone: Not on file     Gets together: Not on file     Attends Sikh service: Not on file     Active member of club or organization: Not on file     Attends meetings of clubs or organizations: Not on file     Relationship status: Not on file    Intimate partner violence:     Fear of current or ex partner: Not on file     Emotionally abused: Not on file     Physically abused: Not on file     Forced sexual activity: Not on file   Other Topics Concern    Not on file   Social History Narrative    Not on file       Family History   Problem Relation Age of Onset    Diabetes Father     Heart Disease Sister     Diabetes Brother     Heart Disease Brother     Diabetes Brother  Diabetes Sister        Current Facility-Administered Medications   Medication Dose Route Frequency Provider Last Rate Last Dose    acetaminophen (TYLENOL) tablet 650 mg  650 mg Oral Q4H PRN Jina Hill MD        oxyCODONE-acetaminophen (PERCOCET) 5-325 mg per tablet 1-2 Tab  1-2 Tab Oral Q6H PRN Jina Hill MD        0.9% sodium chloride infusion 250 mL  250 mL IntraVENous DIALYSIS PRN Mellissa Walsh MD        carvedilol (COREG) tablet 25 mg  25 mg Oral BID Cinthya Tee MD   25 mg at 03/21/19 2233    therapeutic multivitamin [facility]) tablet 1 Tab  1 Tab Oral DAILY Caroline Kaplan MD   1 Tab at 03/22/19 0908    aspirin delayed-release tablet 81 mg  81 mg Oral DAILY Caroline Kaplan MD   81 mg at 03/22/19 0908    NIFEdipine ER (PROCARDIA XL) tablet 60 mg  60 mg Oral DAILY Caroline Kaplan MD        timolol (TIMOPTIC) 0.5 % ophthalmic solution 1 Drop  1 Drop Both Eyes BID Caroline Kaplan MD   1 Drop at 03/22/19 0907    cloNIDine HCl (CATAPRES) tablet 0.1 mg  0.1 mg Oral BID Caroline Kaplan MD   0.1 mg at 03/21/19 2233    docusate sodium (COLACE) capsule 100 mg  100 mg Oral BID Cinthya Tee MD   100 mg at 03/22/19 0908    isosorbide dinitrate (ISORDIL) tablet 20 mg  20 mg Oral BID Cinthya Tee MD   20 mg at 03/21/19 2234    pantoprazole (PROTONIX) tablet 40 mg  40 mg Oral ACB Caroline Kaplan MD   40 mg at 03/22/19 0908    difluprednate (DUREZOL) 0.05 % ophthalmic emulsion 1 Drop  1 Drop Left Eye TID Cinthya Tee MD   1 Drop at 03/22/19 5966    ferric citrate (AURYXIA) tablet 210 mg (Patient Supplied)  210 mg Oral TID WITH MEALS Caroline Kaplan MD        cinacalcet tab 60 mg  60 mg Oral QHS Caroline Kaplan MD   60 mg at 03/21/19 2232    atorvastatin (LIPITOR) tablet 40 mg  40 mg Oral DAILY Caroline Kaplan MD   40 mg at 03/22/19 0908    methylPREDNISolone (PF) (SOLU-MEDROL) injection 60 mg  60 mg IntraVENous Q8H Jina Kaplan MD   60 mg at 03/22/19 0645    sodium chloride (NS) flush 5-40 mL  5-40 mL IntraVENous Q8H Jina Kaplan MD   10 mL at 03/22/19 0645    sodium chloride (NS) flush 5-40 mL  5-40 mL IntraVENous PRN Jina Kaplan MD        heparin (porcine) injection 5,000 Units  5,000 Units SubCUTAneous Ishan Shaikh MD   5,000 Units at 03/22/19 0644    tiZANidine (ZANAFLEX) tablet 4 mg  4 mg Oral TID PRN Tereso Hill MD           Past Medical History:   Diagnosis Date    Acute hearing loss of right ear     Acute urinary retention 8/15    Anemia of chronic disease 2010    saw Dr. Lisa Breaux in past    Asbestosis(501)     Chronic edema     Chronic kidney disease     HD- W-W-F    Colon adenoma     Dr. Sophy Field 2009, 2/15, 4/18    Diabetes mellitus ([facility]     w neuropathy and retinopathy Dr. Orr Orland DJD (degenerative joint disease)     Dyslipidemia     Erectile dysfunction     ESRD on hemodialysis ([facility] [address]     Dr. Amarilis Sheldon, M-W-F    FHx: heart disease     GERD (gastroesophageal reflux disease)     Glaucoma     Dr. Beck Melena failure [facility]) 2011    HTN (hypertension)     Kidney stone     Sarcoidosis     Sleep apnea     NOT using cpap (states resolved)    Thyroid disease        Past Surgical History:   Procedure Laterality Date    COLONOSCOPY N/A 4/10/2018    Dr. Sophy Field adenoma 2009, 2/15; adenoma and hyperplastic 4/10/18    HX CATARACT REMOVAL      HX ORTHOPAEDIC  01/2018    left wrist and humerus fx Dr Gallegos August  9/15    cystoscopy negative Dr Carlos Vick DX [address] S&I N/A 8/1/2018    Fistulogram Left performed by Jose Carolina MD at [facility] CATH LAB   [address] STUDY  2009    negative    VASCULAR SURGERY PROCEDURE UNLIST  3/14    nl BALDEMAR bilat    VASCULAR SURGERY PROCEDURE UNLIST      LEFT ARM GRAFT FOR HD       No Known Allergies    Patient Active Problem List Diagnosis Code    Sleep apnea Dr. Fatuma Arriaza G47.30    Colon adenoma 2/15 Dr. Krista Wilburn D12.6    Erectile dysfunction N52.9    Sarcoidosis D86.9    Dyslipidemia E78.5    Primary hypertension I10    GERD without esophagitis K21.9    Arthritis, degenerative M19.90    End stage renal disease on dialysis ([address] N18.6, Z99.2    Advance directive discussed with patient Z71.89    Diabetic retinopathy ([address] E11.319    Type 2 diabetes, controlled, with neuropathy ([address]  [facility] E11.40    ESRD (end stage renal disease) ([address] N18.6    Type 2 diabetes with nephropathy ([address] E11.21    Leg weakness R29.898    Right leg weakness R29.898         Review of Systems:   Constitutional: no fever or chills  Skin denies rash or itching  HEENT:  Denies tinnitus, hearing loss, or visual changes  Respiratory: denies shortness of breath  Cardiovascular: denies chest pain, dyspnea on exertion  Gastrointestinal: does not report nausea or vomiting  Genitourinary: does not report dysuria or incontinence  Musculoskeletal: does not report joint pain or swelling  Endocrine: denies weight change  Hematology: denies easy bruising or bleeding   Neurological: as above in HPI      PHYSICAL EXAMINATION:      VITAL SIGNS:    Visit Vitals  /80   Pulse 87   Temp 98 °F (36.7 °C) (Oral)   Resp 16   Ht 5' 11\" (1.803 m)   Wt 78.7 kg (173 lb 9.6 oz)   SpO2 97%   BMI 24.21 kg/m²       GENERAL: Well developed, well nourished, in no apparent distress. HEART: RR, no murmurs heard, no carotid bruits  EXTREMITIES: No clubbing, cyanosis, or edema is identified. Pulses 2+    and symmetrical.  HEAD:   Normocephalic, atraumatic. NEUROLOGIC EXAMINATION    MENTAL STATUS: Awake, alert, and oriented x 4. Attention and STM are grossly normal. There is no aphasia. Fund of knowledge is adequate. Mood and affect are appropriate  CRANIAL NERVES: Visual fields are full to confrontation. No fundus anomalies observed.  Pupils are reactive to light and accommodation. Extraocular movements are intact and there is no nystagmus. Facial sensation is normal  Face is symmetrical.   Hearing is present. SCM/TPZ 5/5  Palate rises symmetrically. Tongue is in the midline. MOTOR:   The patient is 5/5 in all four limbs without any drift. CEREBELLAR: No tremors or dysmetria    SENSORY: Dec distal PP, vibration, normal distal toe propioception. Romberg not tested    DTR's: Absent ankle DTR's, all others +2 throughout, no long tract signs     GAIT:   Not tested        I have personally reviewed imaging studies. MRI L spine showed a small disc L3-4 with mild distortion of sac, mild canal anf roram narrowing. Impression: Transient RLE weakness, cramping, and pain, resolved, not suggesting central nervous system disease. Question whether he may have had a musculoskeletal explanation. There is no significant L spine finding on MRI to explain it, and if it was some type of LS radiculoplexopathy I would not have expected symptoms to spontaneously resolve, so etiology although either peripheral or musculoskeletal is unclear. Plan: 1-MRI of the brain pending. Would be surprise if it shows ischemia to explain the peculiar presentation. 2-May D/H if #1 is normal.    3-Should come to neuro clinic for further eval if symptoms recur. PLEASE NOTE:   Portions of this document may have been produced using voice recognition software. Unrecognized errors in transcription may be present. This note will not be viewable in [address]

## 2019-03-22 NOTE — PROGRESS NOTES
Pt not seen for skilled PT due to: 
[]  Nausea/vomiting 
[]  Eating 
[]  Pain 
[]  Pt lethargic [x]  Off Unit Will f/u later as schedule allows. Thank you.  
Brenda Luevano, PT, DPT

## 2019-03-23 NOTE — DISCHARGE INSTRUCTIONS
Patient armband removed and shredded  MyChart Activation    Thank you for requesting access to [facility] [facility]. Please follow the instructions below to securely access and download your online medical record. [facility] [facility] allows you to send messages to your doctor, view your test results, renew your prescriptions, schedule appointments, and more. How Do I Sign Up? 1. In your internet browser, go to [email or web address] ([email or web address])  2. Click on the First Time User? Click Here link in the Sign In box. You will be redirect to the New Member Sign Up page. 3. Enter your [facility] [facility] Access Code exactly as it appears below. You will not need to use this code after youve completed the sign-up process. If you do not sign up before the expiration date, you must request a new code. [facility] Access Code: [email or web address]  Expires: 2019  2:36 PM (This is the date your [facility] access code will )    4. Enter the last four digits of your Social Security Number (xxxx) and Date of Birth (mm/dd/yyyy) as indicated and click Submit. You will be taken to the next sign-up page. 5. Create a [facility] [facility] ID. This will be your [facility] login ID and cannot be changed, so think of one that is secure and easy to remember. 6. Create a [employer] password. You can change your password at any time. 7. Enter your Password Reset Question and Answer. This can be used at a later time if you forget your password. 8. Enter your e-mail address. You will receive e-mail notification when new information is available in [address] 9. Click Sign Up. You can now view and download portions of your medical record. 10. Click the Download Summary menu link to download a portable copy of your medical information. Additional Information    If you have questions, please visit the Frequently Asked Questions section of the [employer] website at [email or web address] Remember, [facility] is NOT to be used for urgent needs.  For medical emergencies, dial 911.    As part of the discharge instructions, medications already given today were discussed with the patient. The next dose due of all ordered meds was highlighted as part of the medication discharge instructions. Discussed with the patient the importance of taking medications as directed, as well as the side effects and adverse reactions to medications ordered. DISCHARGE SUMMARY from Nurse    PATIENT INSTRUCTIONS:    After general anesthesia or intravenous sedation, for 24 hours or while taking prescription Narcotics:  · Limit your activities  · Do not drive and operate hazardous machinery  · Do not make important personal or business decisions  · Do  not drink alcoholic beverages  · If you have not urinated within 8 hours after discharge, please contact your surgeon on call. Report the following to your surgeon:  · Excessive pain, swelling, redness or odor of or around the surgical area  · Temperature over 100.5  · Nausea and vomiting lasting longer than 4 hours or if unable to take medications  · Any signs of decreased circulation or nerve impairment to extremity: change in color, persistent  numbness, tingling, coldness or increase pain  · Any questions    What to do at Home:  Recommended activity: Activity as tolerated,     If you experience any of the following symptoms shortness of breath, chest pain, increased pain in legs or swelling in legs, please follow up with ED or Primary MD.    *  Please give a list of your current medications to your Primary Care Provider. *  Please update this list whenever your medications are discontinued, doses are      changed, or new medications (including over-the-counter products) are added. *  Please carry medication information at all times in case of emergency situations.     These are general instructions for a healthy lifestyle:    No smoking/ No tobacco products/ Avoid exposure to second hand smoke  Surgeon General's Warning:  Quitting smoking now greatly reduces serious risk to your health. Obesity, smoking, and sedentary lifestyle greatly increases your risk for illness    A healthy diet, regular physical exercise & weight monitoring are important for maintaining a healthy lifestyle    You may be retaining fluid if you have a history of heart failure or if you experience any of the following symptoms:  Weight gain of 3 pounds or more overnight or 5 pounds in a week, increased swelling in our hands or feet or shortness of breath while lying flat in bed. Please call your doctor as soon as you notice any of these symptoms; do not wait until your next office visit. Recognize signs and symptoms of STROKE:    F-face looks uneven    A-arms unable to move or move unevenly    S-speech slurred or non-existent    T-time-call 911 as soon as signs and symptoms begin-DO NOT go       Back to bed or wait to see if you get better-TIME IS BRAIN. Warning Signs of HEART ATTACK     Call 911 if you have these symptoms:   Chest discomfort. Most heart attacks involve discomfort in the center of the chest that lasts more than a few minutes, or that goes away and comes back. It can feel like uncomfortable pressure, squeezing, fullness, or pain.  Discomfort in other areas of the upper body. Symptoms can include pain or discomfort in one or both arms, the back, neck, jaw, or stomach.  Shortness of breath with or without chest discomfort.  Other signs may include breaking out in a cold sweat, nausea, or lightheadedness. Don't wait more than five minutes to call 911 - MINUTES MATTER! Fast action can save your life. Calling 911 is almost always the fastest way to get lifesaving treatment. Emergency Medical Services staff can begin treatment when they arrive -- up to an hour sooner than if someone gets to the hospital by car. The discharge information has been reviewed with the patient. The patient verbalized understanding.   Discharge medications reviewed with the patient and appropriate educational materials and side effects teaching were provided.   ___________________________________________________________________________________________________________________________________

## 2019-03-23 NOTE — DISCHARGE SUMMARY
[facility]  Discharge Summary       Patient: Zuleyka Carlson Age: 70 y.o. : 1955 MR#: [id number] [id number]: [id number]  PCP on record: Monica Otero MD  Admit date: 3/21/2019  Discharge date: 3/22/2019    Disposition:    []Home   []Home with Home Health   []SNF/NH   []Rehab   []Home with family   []Alternate Facility:____________________    Admission Diagnoses:  Leg weakness [R29.898]  Right leg weakness [R29.898]    Discharge Diagnoses:                             1. transient right leg weakness  2. Subacute back pain   3. ESRD on HD MWF  4. HTN  5. Chronic Anemia of chronic disease   6. 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 HLD    Discharge Medications:     Current Discharge Medication List      START taking these medications    Details   tiZANidine (ZANAFLEX) 4 mg tablet Take 1 Tab by mouth every six (6) hours as needed for Other (spasms). Qty: 15 Tab, Refills: 0      predniSONE (DELTASONE) 10 mg tablet Take 30 mg by mouth two (2) times a day for 3 days, THEN 20 mg two (2) times a day for 3 days, THEN 10 mg two (2) times a day for 3 days. Qty: 36 Tab, Refills: 0         CONTINUE these medications which have NOT CHANGED    Details   atorvastatin (LIPITOR) 40 mg tablet Take 1 Tab by mouth daily. Qty: 90 Tab, Refills: 3    Associated Diagnoses: Dyslipidemia      DUREZOL 0.05 % ophthalmic emulsion INSTILL 1 DROP LEFT EYE TID AS DIRECTED  Refills: 3      AURYXIA 210 mg iron tablet Take 210 mg by mouth three (3) times daily (with meals). cinacalcet (SENSIPAR) 60 mg tab Take 60 mg by mouth nightly. pantoprazole (PROTONIX) 40 mg tablet TAKE 1 TABLET BY MOUTH TWICE DAILY  Qty: 180 Tab, Refills: 0    Comments: **Patient requests 90 days supply**  Associated Diagnoses: GERD without esophagitis      isosorbide dinitrate (ISORDIL) 20 mg tablet Take 1 Tab by mouth two (2) times a day.   Qty: 180 Tab, Refills: 3      cloNIDine HCl (CATAPRES) 0.1 mg tablet Take  by mouth two (2) times a day. docusate sodium (COLACE) 100 mg capsule Take 100 mg by mouth two (2) times a day. aspirin delayed-release 81 mg tablet Take 81 mg by mouth daily. NIFEdipine XL (PROCARDIA-XL) 90 mg tablet Take 60 mg by mouth daily. carvedilol (COREG) 25 mg tablet Take 25 mg by mouth two (2) times a day. MULTIVITAMIN PO Take  by mouth daily. timolol (TIMOPTIC) 0.5 % ophthalmic solution Administer 1 Drop to both eyes two (2) times a day. Refills: 3         STOP taking these medications       cinnamon bark (CINNAMON) 500 mg cap Comments:   Reason for Stopping:               Consults:    - NEPHROLOGY  - NEUROLOGY     Procedures:  -   NONE  Significant Diagnostic Studies:   -  Study Result     CT of the head without contrast     CT CODE:  74012     HISTORY:Code S.      COMPARISON: 10/28/16     TECHNIQUE: Helical axial scan to the head was performed from the skull base to  the vertex without IV contrast administration.     All CT scans at this facility performed using dose optimization techniques as  appreciated to a performed exam, to include automated exposure control,  adjustment of the mA and or KU according to patient size (including appropriate  matching for site specific examination), or use of iterative reconstruction  technique.     FINDINGS:     Mild global atrophy again noted. Tiny old lacunar infarct in right caudate  nucleus and internal capsule appear stable. Mild periventricular white matter  hypodensities appear stable as well. .  There is no evidence of acute  intracranial hemorrhage or mass effect identified. No hyperdense MCA sign. No  skull fracture or extra axial fluid collections identified. Visualized sinuses  and mastoid air cells appear unremarkable.      IMPRESSION  IMPRESSION:     No acute intracranial hemorrhage or mass effect.   Note: If clinical concern of acute stroke, CTA or MRI with diffusion weighted  images is recommended for further evaluation.       Stable mild microvascular disease and old lacunar infarcts in right basal  ganglia.     The wet read was provided to the ordering physician in ER,, Dr. Francia Vyas, by  me upon the initial interpretation at approximately 1612 hours, confirmation  received.     Thank you for your referral.      Study Result     AP CHEST, PORTABLE     INDICATION: Suspected stroke.     COMPARISON: Prior chest x-rays, most recent 1/6/2016     FINDINGS:  EKG leads overlie the patient.     The cardiac silhouette is normal in size. The pulmonary vasculature is  unremarkable. There is a nodular density at the left lung base projecting over  the posterior left seventh rib. No focal consolidation, pleural effusion, or  pneumothorax. No acute osseous abnormalities are identified. Left subclavian  vascular stent noted.     IMPRESSION  Impression:      1. Nodular density projecting at the left lung base, not clearly seen on prior  exams. -Recommend nonemergent CT scan for further evaluation.        Study Result     MRI LUMBAR SPINE      CPT CODE: 41954     INDICATION: Back pain. Right leg pain. .     TECHNIQUE: MRI of the lumbar spine performed consisting of sagittal T1, T2 and  inversion recovery sequences with additional axial T2 sequence and oblique axial  T1 sequence.     COMPARISON: None.     FINDINGS:      Sagittal images show preserved alignment. No vertebral body compression  deformity or listhesis. There is markedly heterogeneous marrow signal throughout  all of the lumbar vertebrae, with superior endplate Schmorl's nodes involving L1  and L2. Small amount of edema surrounding. Patchy increased signal on the inversion recovery sequence involving the lower  L4 endplate fairly diffuse. Not as pronounced on T2. Hemangioma versus some  edema. Minimal endplate impression posterior midline, possible small Schmorl's  node.     Cauda equina tapers at L1.  No canal mass.     Correlation with axial images shows:     L1-2:  No significant canal or foraminal stenosis. Enlarged multicystic kidneys  bilaterally, incompletely included.     L2-3:  No significant canal or foraminal stenosis.     L3-4: Diffuse disc bulge, with superimposed focal disc protrusion or herniation  extending into the right lateral recess, sagittal image 10, axial image 20 and  19. This contacts and distorts the thecal sac locally, at the level of the  originating right sided nerve rootlets. Disc extends approximately 1.2 cm  inferior to the upper L4 disc space margin. Disc material extends approximately  4 mm into the canal.   - Mild overall central canal narrowing to 11 mm due to disc bulge. Mild  bilateral foraminal narrowing due to asymmetry of disc bulge, more so on the  left     L4-5: At least mild bilateral degenerative facet hypertrophy diffuse disc bulge  with mildly irregular broad-based protrusion. Mild central canal narrowing to 11  mm. Mild left and mild-to-moderate right foraminal encroachment due to facet  hypertrophy and disc bulge.     L5-S1: There is mild broad-based central protrusion at the partially  transitional level which appears to contact the ventral thecal sac just below  the level of the originating sacral nerve roots. Central canal is not  significantly narrowed. No significant foraminal stenosis.       IMPRESSION  IMPRESSION:     1. Small disc herniation L3-4 extending into the right lateral recess as above. Mild distortion of the thecal sac and possibly originating nerve roots  regionally.      2. Mild canal and foraminal narrowing at the lower lumbar levels due to disc  bulge and some facet hypertrophy.      3. No other significant canal or foraminal stenosis.      4. Normal alignment. Markedly heterogeneous marrow signal with presumed  developing Schmorl's nodes L1, L2 and L4. Study Result     Brain MR Without Contrast  HISTORY: Transient right-sided weakness, right lower extremity weakness.   COMPARISON:   -Brain CT previous day   - [facility] MR 10/31/2013. TECHNIQUE: Brain scanned with sagittal T1W scans, axial T2W scans, axial  diffusion weighted images. Axial SWI. Coronal T1  FINDINGS:   There is very subtle small focus increased DWI signal at left meenu, in the  region of the cortical spinal tract, with unclear ADC correlate but with  definite small increased T2 signal lesion. There was a slightly smaller  increased T2 signal focus projected at this site on 2013 exam. Favored  explanation for this is chronic lacunar infarct, not an unusually subtle (DWI)  acute stroke.     Mild to moderate burden of cerebral white matter lesions, with increased T2  signal, periventricular and beyond; this looks similar to 2013 (close comparison  not possible). . CSF spaces are normal size for age.     Nothing specifically suggesting acute hemorrhage. There is a small chronic  appearing hemorrhage (stable gliosis) at right lateral lentiform. There are 2  punctate likely chronic hemorrhages at the cerebellum. This pattern of  hemorrhage suggests hypertensive etiology     Paranasal sinuses are clear. Flow status of nondominant RVA is indeterminate,  unchanged when compared 2013; expected flow void is seen in other largest  arteries base of brain. .        IMPRESSION  IMPRESSION:  1. No likely acute stroke   - Small left meenu lacunar infarct is very likely chronic   2. Mildly to moderately extensive lesions of chronic ischemic small vessel  disease   3. Several small hemorrhages which are likely entirely chronic  -Distribution suggests hypertensive etiology   4. Flow status of small RVA is indeterminate (unchanged in this regard compared  2013)   - Expected flow phenomena identified in other largest arteries base of brain     Hospital Course by Problem   HPI per admitting MD  \" 69 y/o male admitted yesterday with a CC of right lower extremity weakness. He describes he was walking and that his right hip down his thigh and calf felt as if he had a spasm.  He had diminished control and was dragging his leg. He has a weak left leg from musculoskeletal issues and his right leg is his strong one. Symptoms resolved and have not recurred. Denied any weakness. He had twisted his back about 2 wks ago. \"    1. transient right leg weakness with associated Subacute back pain. Admitting diagnosis with focus to rule out CVA. Neurology followed. CTA head showed No acute intracranial hemorrhage or mass effect. Stable mild microvascular disease and old lacunar infarcts in right basal ganglia. MRI brain showed No likely acute stroke, showed small left meenu lacunar infarct is very likely chronic; Mildly to moderately extensive lesions of chronic ischemic small vessel Disease; Several small hemorrhages which are likely entirely chronic. Distribution suggests hypertensive etiology. No overt evidence suggestive of central nervous system disease, CVA ruled out. MRI lumbar spine showed Small disc herniation L3-4 extending into the right lateral recess. mild distortion of the thecal sac and possibly originating nerve roots regionally. Mild canal and foraminal narrowing at the lower lumbar levels due to disc bulge and some facet hypertrophy. No significant lumbar spine findings on MRI to explain symptoms. musculoskeletal involvement suspected, possibly radiculoneuropathy. No further inpatient interventions warranted. Exam otherwise reassuring. OP f/u Neurology, PT/OT. 3. ESRD on HD MWF. Nephrology followed, patient resumed HD as when outpatient. 4. HTN. Resumed on home regimen. No further inpatient interventions warranted. OP f/u PCP. Today's examination of the patient revealed:     Subjective:   Alert and oriented. NAD. Family member at bedside. No overnight events, no new complaints.    Objective:   VS:   Visit Vitals  /85 (BP 1 Location: Right arm, BP Patient Position: At rest)   Pulse 83   Temp 98.4 °F (36.9 °C)   Resp 17   Ht 5' 11\" (1.803 m)   Wt 78.7 kg (173 lb 9.6 oz)   SpO2 97%   BMI 24.21 kg/m²      Tmax/24hrs: Temp (24hrs), Av.2 °F (36.8 °C), Min:97.8 °F (36.6 °C), Max:98.7 °F (37.1 °C)     Input/Output:     Intake/Output Summary (Last 24 hours) at 3/22/2019 2224  Last data filed at 3/22/2019 1330  Gross per 24 hour   Intake    Output 2000 ml   Net -2000 ml       General:  Alert, NAD  Cardiovascular:  RRR  Pulmonary:  LSC throughout; respiratory effort WNL  GI:  +BS in all four quadrants, soft, non-tender  Extremities:  No edema; 2+ dorsalis pedis pulses bilaterally      Labs:    Recent Results (from the past 24 hour(s))   LIPID PANEL    Collection Time: 19  4:11 AM   Result Value Ref Range    LIPID PROFILE          Cholesterol, total 220 (H) <200 MG/DL    Triglyceride 122 <150 MG/DL    HDL Cholesterol 28 (L) 40 - 60 MG/DL    LDL, calculated 167.6 (H) 0 - 100 MG/DL    VLDL, calculated 24.4 MG/DL    CHOL/HDL Ratio 7.9 (H) 0 - 5.0     HEMOGLOBIN A1C WITH EAG    Collection Time: 19  4:11 AM   Result Value Ref Range    Hemoglobin A1c 5.5 4.2 - 5.6 %    Est. average glucose 111 mg/dL   GLUCOSE, POC    Collection Time: 19  7:36 AM   Result Value Ref Range    Glucose (POC) 111 (H) 70 - 110 mg/dL   GLUCOSE, POC    Collection Time: 19  2:15 PM   Result Value Ref Range    Glucose (POC) 122 (H) 70 - 110 mg/dL   HEP B SURFACE AG    Collection Time: 19  4:18 PM   Result Value Ref Range    Hepatitis B surface Ag <0.10 <1.00 Index    Hep B surface Ag Interp. NEGATIVE  NEG         ADDITIONAL INFORMATION: If you experience any of the following symptoms but not limited to Fever, chills, nausea, vomiting, diarrhea, change in mentation, falling, bleeding, shortness of breath, chest pain, please call your primary care physician or return to the emergency room if you cannot get hold of your doctor:       Condition: Stable    Follow-up Appointments: Follow-up Appointments   Procedures    FOLLOW UP VISIT Appointment in: Other ([facility]) 1.  Follow up with neurology in 1-2 weeks. 2. Follow up with PCP in 5-7 days. 1. Follow up with neurology in 1-2 weeks. 2. Follow up with PCP in 5-7 days. Standing Status:   Standing     Number of Occurrences:   1     Order Specific Question:   Appointment in     Answer:    Other (Specify)           >30 minutes spent coordinating this discharge (review instructions/follow-up, prescriptions, preparing report for sign off)    Signed:  Leti Benavidez NP  3/22/2019  10:24 PM

## 2019-03-25 ENCOUNTER — PATIENT OUTREACH (OUTPATIENT)
Dept: INTERNAL MEDICINE CLINIC | Age: 64
End: 2019-03-25

## 2019-03-25 ENCOUNTER — HOME HEALTH ADMISSION (OUTPATIENT)
Dept: HOME HEALTH SERVICES | Facility: HOME HEALTH | Age: 64
End: 2019-03-25
Payer: MEDICARE

## 2019-03-25 LAB
HBV SURFACE AB SER QL IA: POSITIVE
HBV SURFACE AB SERPL IA-ACNC: 14.88 MIU/ML
HEP BS AB COMMENT,HBSAC: NORMAL

## 2019-03-25 NOTE — Clinical Note
[facility] 3/21-3/22 transient right leg weaknessTOC appt 3/28 at 1040 AMPlease review imaging; may benefit from ortho referral to address L3-L4 herniation

## 2019-03-25 NOTE — PROGRESS NOTES
03/25/19 at 1448 Pt d/c'd on Friday with home health. Called pt and he signed Freedom of Choice form via telephone for [facility]. Notified Candice Tee of [facility].

## 2019-03-25 NOTE — PROGRESS NOTES
Per Cali Nagel (JEREMY) sent Home Health referral [facility] Informed Cali Nagel referral has been sent.

## 2019-03-25 NOTE — HOME CARE
Received HH referral this afternoon by  Pablo Muller), pt discharged on 3/22/19, [facility] referral processed to [facility] central intake for PT/OT; pt states he has a Rollator and cane,pt takes HD on M-W-Fri at 8am-11:30 am ; [facility] will follow. ELISABETH VIDALES.

## 2019-03-25 NOTE — PROGRESS NOTES
Hospital Discharge Follow-Up Date/Time:  3/25/2019 12:31 PM 
 
Patient was admitted to [facility] on 3/21/19 and discharged on 3/22/19 for transient right leg weakness. The physician discharge summary was available at the time of outreach. Patient was contacted within 1 business days of discharge. Top Challenges reviewed with the provider May need ortho referral; imaging indicated small disc herniation L3-4 extending into the right lateral recess as above. Mild distortion of the thecal sac and possibly originating nerve roots 
regionally. Method of communication with provider :chart routing Inpatient RRAT score: 29 Was this a readmission? no  
Patient stated reason for the readmission: N/A Nurse Navigator (NN) contacted the patient by telephone to perform post hospital discharge assessment. Verified name and  with patient as identifiers. Provided introduction to self, and explanation of the Nurse Navigator role. Patient reported:  
Right leg pain not as intense not as often since starting new medications 10/10 when pain hits; better with lying down or walking; worse with sitting Pain feels like a cramp and intensifies Pain starts in thigh and then goes to lower back and calf Make very little urine BM yesterday /77 this morning after eating Dizziness getting better Patient denied: N/V 
SOB Abdominal pain Increased respirations Fever Chills CP/pressure Edema Reviewed discharge instructions and red flags with patient who verbalized understanding. Patient given an opportunity to ask questions and does not have any further questions or concerns at this time. The patient agrees to contact the PCP office for questions related to their healthcare. NN provided contact information for future reference. Disease Specific:   N/A Summary of patient's top problems: 1. Right leg pain; improving since starting prednisone and zanaflex 2. ESRD; on HD MWF at [facility] [place] 3. Chronic anemia; reviewed past 3 CBC; HGB at baseline of 12; HCT between 39.6-35.5 which is within his [facility] Home Health orders at discharge: none Home Health company: N/A Date of initial visit: N/A Durable Medical Equipment ordered/company: N/A Durable Medical Equipment received: N/A Barriers to care? None at this time. Advance Care Planning:  
Does patient have an Advance Directive:  not on file; will discuss at a later time Medication(s):  
New Medications at Discharge: Prednisone, Zanaflex Changed Medications at Discharge: None Discontinued Medications at Discharge: [place] Medication reconciliation was performed with patient, who verbalizes understanding of administration of home medications. There were no barriers to obtaining medications identified at this time. Referral to Pharm D needed: no  
 
Current Outpatient Medications Medication Sig  tiZANidine (ZANAFLEX) 4 mg tablet Take 1 Tab by mouth every six (6) hours as needed for Other (spasms).  predniSONE (DELTASONE) 10 mg tablet Take 30 mg by mouth two (2) times a day for 3 days, THEN 20 mg two (2) times a day for 3 days, THEN 10 mg two (2) times a day for 3 days.  atorvastatin (LIPITOR) 40 mg tablet Take 1 Tab by mouth daily.  DUREZOL 0.05 % ophthalmic emulsion INSTILL 1 DROP LEFT EYE TID AS DIRECTED  AURYXIA 210 mg iron tablet Take 210 mg by mouth three (3) times daily (with meals).  cinacalcet (SENSIPAR) 60 mg tab Take 60 mg by mouth nightly.  pantoprazole (PROTONIX) 40 mg tablet TAKE 1 TABLET BY MOUTH TWICE DAILY  isosorbide dinitrate (ISORDIL) 20 mg tablet Take 1 Tab by mouth two (2) times a day.  cloNIDine HCl (CATAPRES) 0.1 mg tablet Take  by mouth two (2) times a day.  docusate sodium (COLACE) 100 mg capsule Take 100 mg by mouth two (2) times a day.   
 timolol (TIMOPTIC) 0.5 % ophthalmic solution Administer 1 Drop to both eyes two (2) times a day.  aspirin delayed-release 81 mg tablet Take 81 mg by mouth daily.  NIFEdipine XL (PROCARDIA-XL) 90 mg tablet Take 90 mg by mouth daily.  carvedilol (COREG) 25 mg tablet Take 25 mg by mouth two (2) times a day.  MULTIVITAMIN PO Take  by mouth daily. No current facility-administered medications for this visit. There are no discontinued medications. [facility] follow up appointment(s):  
Future Appointments Date Time Provider Tamir Santillan 3/28/2019 10:40 AM Terry Luciano MD [facility] SCHED  
2/14/2020  8:05 AM [facility] NURSE VISIT [facility] SCHED  
2/21/2020  2:00 PM Terry Luciano MD [address] [facility] follow up appointment(s): N/A [facility]:  n/a  
 
 
Goals  Prevent complications post hospitalization. No admissions post 60 days from discharge of 3/22/19. Initial Care Planning/Transition of Care GOAL:  PREVENT CONDITION WORSENING Tasks: 
Ensure provider appt is scheduled within 7 days post-discharge; patient aware of PCP appt on 3/28 and the importance of attending appt Confirm patient attended post-discharge provider appt; pending Review/educate common or potential \"red flags\" of condition worsening; patient education on red flags of stroke and to report any worsening or concerning s/s Assess for health risk behaviors and educate patient/caregivers on reducing risk Instruct on adherence to medications as ordered and assess for therapeutic response and side-effects; patient started newly prescribed meds on Sunday Monitor lab results and symptoms, escalate to provider; hx of chronic anemia; H/H stable Ensure DME in place and used as instructed; N/A Evaluate adherence to medications and priority barriers to resolve;will continue to monitor GOAL: REDUCE RISK FOR HOSPITALIZATION Tasks: 
Complete post-visit call to confirm attendance and update care needs; will continue to monitor/assess Communicate visits and goals between multiple providers and services; N/A Review the Home Visit or Home Health documentation for coordinating care and goals; N/a Consult for polypharmacy (pharmacist or provider); N/A Coordinate plan to treat at home when symptoms arise Observe for trends in symptoms and measures, provide direction to patient, and notify provider as needed Discuss and provide resources for ACP; will follow up at a later time

## 2019-03-26 ENCOUNTER — HOME CARE VISIT (OUTPATIENT)
Dept: SCHEDULING | Facility: HOME HEALTH | Age: 64
End: 2019-03-26
Payer: MEDICARE

## 2019-03-26 VITALS
SYSTOLIC BLOOD PRESSURE: 149 MMHG | OXYGEN SATURATION: 98 % | DIASTOLIC BLOOD PRESSURE: 68 MMHG | TEMPERATURE: 97.2 F | HEART RATE: 59 BPM

## 2019-03-26 PROCEDURE — 400013 HH SOC

## 2019-03-26 PROCEDURE — G0151 HHCP-SERV OF PT,EA 15 MIN: HCPCS

## 2019-03-26 PROCEDURE — 3331090002 HH PPS REVENUE DEBIT

## 2019-03-26 PROCEDURE — 3331090001 HH PPS REVENUE CREDIT

## 2019-03-27 ENCOUNTER — HOME CARE VISIT (OUTPATIENT)
Dept: HOME HEALTH SERVICES | Facility: HOME HEALTH | Age: 64
End: 2019-03-27
Payer: MEDICARE

## 2019-03-27 PROCEDURE — 3331090001 HH PPS REVENUE CREDIT

## 2019-03-27 PROCEDURE — 3331090002 HH PPS REVENUE DEBIT

## 2019-03-28 ENCOUNTER — PATIENT OUTREACH (OUTPATIENT)
Dept: INTERNAL MEDICINE CLINIC | Age: 64
End: 2019-03-28

## 2019-03-28 ENCOUNTER — HOME CARE VISIT (OUTPATIENT)
Dept: SCHEDULING | Facility: HOME HEALTH | Age: 64
End: 2019-03-28
Payer: MEDICARE

## 2019-03-28 ENCOUNTER — OFFICE VISIT (OUTPATIENT)
Dept: INTERNAL MEDICINE CLINIC | Age: 64
End: 2019-03-28

## 2019-03-28 VITALS
SYSTOLIC BLOOD PRESSURE: 154 MMHG | OXYGEN SATURATION: 97 % | DIASTOLIC BLOOD PRESSURE: 80 MMHG | TEMPERATURE: 97.7 F | HEART RATE: 57 BPM | BODY MASS INDEX: 24.64 KG/M2 | HEIGHT: 71 IN | RESPIRATION RATE: 14 BRPM | WEIGHT: 176 LBS

## 2019-03-28 DIAGNOSIS — N18.6 ESRD (END STAGE RENAL DISEASE) (HCC): ICD-10-CM

## 2019-03-28 DIAGNOSIS — I10 PRIMARY HYPERTENSION: ICD-10-CM

## 2019-03-28 DIAGNOSIS — R29.898 TRANSIENT RIGHT LEG WEAKNESS: Primary | ICD-10-CM

## 2019-03-28 PROCEDURE — G0157 HHC PT ASSISTANT EA 15: HCPCS

## 2019-03-28 PROCEDURE — 3331090001 HH PPS REVENUE CREDIT

## 2019-03-28 PROCEDURE — 3331090002 HH PPS REVENUE DEBIT

## 2019-03-28 NOTE — PROGRESS NOTES
1. Have you been to the ER, urgent care clinic or hospitalized since your last visit? YES. [facility] discharged 3/22/19 for right leg weakness 2. Have you seen or consulted any other health care providers outside of the [address] [place] [address] since your last visit (Include any pap smears or colon screening)? NO Do you have an Advanced Directive?  Yes

## 2019-03-28 NOTE — PROGRESS NOTES
Goals Addressed This Visit's Progress  Prevent complications post hospitalization. On track No admissions post 60 days from discharge of 3/22/19. Initial Care Planning/Transition of Care GOAL:  PREVENT CONDITION WORSENING Tasks: 
Ensure provider appt is scheduled within 7 days post-discharge; patient aware of PCP appt on 3/28 and the importance of attending appt Confirm patient attended post-discharge provider appt; 3/28: Patient attended PCP appt as scheduled. Review/educate common or potential \"red flags\" of condition worsening; patient education on red flags of stroke and to report any worsening or concerning s/s Assess for health risk behaviors and educate patient/caregivers on reducing risk Instruct on adherence to medications as ordered and assess for therapeutic response and side-effects; patient started newly prescribed meds on Sunday Monitor lab results and symptoms, escalate to provider; hx of chronic anemia; H/H stable Ensure DME in place and used as instructed; N/A Evaluate adherence to medications and priority barriers to resolve;will continue to monitor GOAL: REDUCE RISK FOR HOSPITALIZATION Tasks: 
Complete post-visit call to confirm attendance and update care needs; will continue to monitor/assess Communicate visits and goals between multiple providers and services; N/A Review the Home Visit or Home Health documentation for coordinating care and goals; N/a Consult for polypharmacy (pharmacist or provider); N/A Coordinate plan to treat at home when symptoms arise Observe for trends in symptoms and measures, provide direction to patient, and notify provider as needed Discuss and provide resources for ACP; will follow up at a later time

## 2019-03-29 PROCEDURE — 3331090001 HH PPS REVENUE CREDIT

## 2019-03-29 PROCEDURE — 3331090002 HH PPS REVENUE DEBIT

## 2019-03-29 NOTE — PROGRESS NOTES
Patient being seen today for transitional care management Patient was admitted to [facility] from 3/21-22/19 Initial interactive contact was done by nurse navigator I thoroughly reviewed the discharge summary, notes, consults, labs and imaging studies in the electronic record. Pertinent details are summarized below and the record has been updated to reflect recent events He was admitted for r/o cva after he presented with RLE weakness. CT head showed no acute findings. Mri l spine showed L3-4 disc herniation into right lateral recess w distortion of thecal sac. He was seen by Dr Bernadette Steel for neuro but the sx had almost resolved by then. MRI head showed no acute cva, mod wm changes, left meenu old infarct. Since dc, everything has cleared up Past Medical History:  
Diagnosis Date  Acute hearing loss of right ear  Acute urinary retention 8/15  Anemia of chronic disease 2010  
 saw Dr. Janet Webster in past  
 Asbestosis(501)  Chronic edema  Chronic kidney disease HD- W-W-F  Colon adenoma Dr. Aaliyah Faust 2009, 2/15, 4/18  Diabetes mellitus (HCC)   
 w neuropathy and retinopathy Dr. Linda Quintanilla  DJD (degenerative joint disease)  Dyslipidemia  Erectile dysfunction  ESRD on hemodialysis ([facility] [address] Dr. Le Zimmerman, M-W-F  FHx: heart disease  GERD (gastroesophageal reflux disease)  Glaucoma Dr. Trish George failure [facility]) 2011  
 HTN (hypertension)  Kidney stone  Sarcoidosis  Sleep apnea NOT using cpap (states resolved)  Thyroid disease Current Outpatient Medications Medication Sig  tiZANidine (ZANAFLEX) 4 mg tablet Take 1 Tab by mouth every six (6) hours as needed for Other (spasms).  predniSONE (DELTASONE) 10 mg tablet Take 30 mg by mouth two (2) times a day for 3 days, THEN 20 mg two (2) times a day for 3 days, THEN 10 mg two (2) times a day for 3 days.  atorvastatin (LIPITOR) 40 mg tablet Take 1 Tab by mouth daily.  DUREZOL 0.05 % ophthalmic emulsion INSTILL 1 DROP LEFT EYE TID AS DIRECTED  AURYXIA 210 mg iron tablet Take 210 mg by mouth three (3) times daily (with meals).  cinacalcet (SENSIPAR) 60 mg tab Take 60 mg by mouth nightly.  pantoprazole (PROTONIX) 40 mg tablet TAKE 1 TABLET BY MOUTH TWICE DAILY  isosorbide dinitrate (ISORDIL) 20 mg tablet Take 1 Tab by mouth two (2) times a day.  cloNIDine HCl (CATAPRES) 0.1 mg tablet Take  by mouth two (2) times a day.  docusate sodium (COLACE) 100 mg capsule Take 100 mg by mouth two (2) times a day.  timolol (TIMOPTIC) 0.5 % ophthalmic solution Administer 1 Drop to both eyes two (2) times a day.  aspirin delayed-release 81 mg tablet Take 81 mg by mouth daily.  NIFEdipine XL (PROCARDIA-XL) 90 mg tablet Take 90 mg by mouth daily.  carvedilol (COREG) 25 mg tablet Take 25 mg by mouth two (2) times a day.  MULTIVITAMIN PO Take  by mouth daily. No current facility-administered medications for this visit. No Known Allergies Visit Vitals /80 (BP 1 Location: Right arm, BP Patient Position: Sitting) Pulse (!) 57 Temp 97.7 °F (36.5 °C) (Oral) Resp 14 Ht 5' 11\" (1.803 m) Wt 176 lb (79.8 kg) SpO2 97% BMI 24.55 kg/m² A&O x3 Affect is appropriate. Mood stable No apparent distress Anicteric, no JVD, adenopathy or thyromegaly. No carotid bruits or radiated murmur Lungs clear to auscultation, no wheezes or rales Heart showed regular rate and rhythm. No murmur, rubs, gallops Abdomen soft nontender, no hepatosplenomegaly or masses. Extremities without edema. Pulses 1-2+ symmetrically Assessment and plan: 1. Transient RLE weakness etiology unclear. Observation 2. DM. Controlled 3. HTN. Continue current regimen. 4. ESRD. F/u Dr Rm Leal Above conditions discussed at length and patient vocalized understanding. All questions answered to patient satisfaction

## 2019-03-30 PROCEDURE — 3331090002 HH PPS REVENUE DEBIT

## 2019-03-30 PROCEDURE — 3331090001 HH PPS REVENUE CREDIT

## 2019-03-31 PROCEDURE — 3331090001 HH PPS REVENUE CREDIT

## 2019-03-31 PROCEDURE — 3331090002 HH PPS REVENUE DEBIT

## 2019-04-01 VITALS
HEART RATE: 82 BPM | SYSTOLIC BLOOD PRESSURE: 150 MMHG | TEMPERATURE: 98.2 F | DIASTOLIC BLOOD PRESSURE: 84 MMHG | OXYGEN SATURATION: 98 %

## 2019-04-01 PROCEDURE — 3331090001 HH PPS REVENUE CREDIT

## 2019-04-01 PROCEDURE — 3331090002 HH PPS REVENUE DEBIT

## 2019-04-02 ENCOUNTER — HOME CARE VISIT (OUTPATIENT)
Dept: SCHEDULING | Facility: HOME HEALTH | Age: 64
End: 2019-04-02
Payer: MEDICARE

## 2019-04-02 PROCEDURE — 3331090002 HH PPS REVENUE DEBIT

## 2019-04-02 PROCEDURE — 3331090001 HH PPS REVENUE CREDIT

## 2019-04-02 PROCEDURE — G0157 HHC PT ASSISTANT EA 15: HCPCS

## 2019-04-03 PROCEDURE — 3331090001 HH PPS REVENUE CREDIT

## 2019-04-03 PROCEDURE — 3331090002 HH PPS REVENUE DEBIT

## 2019-04-04 ENCOUNTER — HOME CARE VISIT (OUTPATIENT)
Dept: SCHEDULING | Facility: HOME HEALTH | Age: 64
End: 2019-04-04
Payer: MEDICARE

## 2019-04-04 PROCEDURE — 3331090002 HH PPS REVENUE DEBIT

## 2019-04-04 PROCEDURE — G0157 HHC PT ASSISTANT EA 15: HCPCS

## 2019-04-04 PROCEDURE — 3331090001 HH PPS REVENUE CREDIT

## 2019-04-05 ENCOUNTER — PATIENT OUTREACH (OUTPATIENT)
Dept: INTERNAL MEDICINE CLINIC | Age: 64
End: 2019-04-05

## 2019-04-05 PROCEDURE — 3331090002 HH PPS REVENUE DEBIT

## 2019-04-05 PROCEDURE — 3331090001 HH PPS REVENUE CREDIT

## 2019-04-05 NOTE — PROGRESS NOTES
Hospital Discharge Follow-Up Date/Time:  2019 1:09 PM 
 
Patient Outreach made to patient. He states he is doing well. He is getting around okay. He is using a cane. He still have a small amount of leg pain but nothing like before. He also reports that he is able to sit down now also. He denies needing any assistance at this time. Top Challenges reviewed with the provider  
none Method of communication with provider :none Nurse Navigator (NN) contacted the patient by telephone to perform post hospital discharge assessment. Verified name and  with patient as identifiers. Provided introduction to self, and explanation of the Nurse Navigator role. Disease Specific:   N/A Summary of patient's top problems: 
1.  
2.  
3.  
 
 
Barriers to care? none Current Outpatient Medications Medication Sig  tiZANidine (ZANAFLEX) 4 mg tablet Take 1 Tab by mouth every six (6) hours as needed for Other (spasms).  atorvastatin (LIPITOR) 40 mg tablet Take 1 Tab by mouth daily.  DUREZOL 0.05 % ophthalmic emulsion INSTILL 1 DROP LEFT EYE TID AS DIRECTED  AURYXIA 210 mg iron tablet Take 210 mg by mouth three (3) times daily (with meals).  cinacalcet (SENSIPAR) 60 mg tab Take 60 mg by mouth nightly.  pantoprazole (PROTONIX) 40 mg tablet TAKE 1 TABLET BY MOUTH TWICE DAILY  isosorbide dinitrate (ISORDIL) 20 mg tablet Take 1 Tab by mouth two (2) times a day.  cloNIDine HCl (CATAPRES) 0.1 mg tablet Take  by mouth two (2) times a day.  docusate sodium (COLACE) 100 mg capsule Take 100 mg by mouth two (2) times a day.  timolol (TIMOPTIC) 0.5 % ophthalmic solution Administer 1 Drop to both eyes two (2) times a day.  aspirin delayed-release 81 mg tablet Take 81 mg by mouth daily.  NIFEdipine XL (PROCARDIA-XL) 90 mg tablet Take 90 mg by mouth daily.  carvedilol (COREG) 25 mg tablet Take 25 mg by mouth two (2) times a day.  MULTIVITAMIN PO Take  by mouth daily. No current facility-administered medications for this visit. There are no discontinued medications. [facility] follow up appointment(s):  
Future Appointments Date Time Provider Tamir Santillan 4/9/2019 To Be Determined Abdulkadir Watson PTA [facility]  
4/11/2019 To Be Determined Al[facility]  
4/16/2019 To Be Determined Alire[facility]  
4/18/2019 To Be Determined Sandrine Guerrero PT [facility]  
2/14/2020  8:05 AM [facility] NURSE VISIT [address]  
2/21/2020  2:00 PM Anna Nunes MD [address] Goals  Prevent complications post hospitalization. No admissions post 60 days from discharge of 3/22/19. Initial Care Planning/Transition of Care GOAL:  PREVENT CONDITION WORSENING Tasks: 
Ensure provider appt is scheduled within 7 days post-discharge; patient aware of PCP appt on 3/28 and the importance of attending appt Confirm patient attended post-discharge provider appt; 3/28: Patient attended PCP appt as scheduled. Review/educate common or potential \"red flags\" of condition worsening; patient education on red flags of stroke and to report any worsening or concerning s/s Assess for health risk behaviors and educate patient/caregivers on reducing risk Instruct on adherence to medications as ordered and assess for therapeutic response and side-effects; patient started newly prescribed meds on Sunday Monitor lab results and symptoms, escalate to provider; hx of chronic anemia; H/H stable Ensure DME in place and used as instructed; N/A Evaluate adherence to medications and priority barriers to resolve;will continue to monitor GOAL: REDUCE RISK FOR HOSPITALIZATION Tasks: 
Complete post-visit call to confirm attendance and update care needs; will continue to monitor/assess Communicate visits and goals between multiple providers and services; N/A    
 Review the Home Visit or Home Health documentation for coordinating care and goals; N/a Consult for polypharmacy (pharmacist or provider); N/A Coordinate plan to treat at home when symptoms arise Observe for trends in symptoms and measures, provide direction to patient, and notify provider as needed Discuss and provide resources for ACP; will follow up at a later time

## 2019-04-06 PROCEDURE — 3331090002 HH PPS REVENUE DEBIT

## 2019-04-06 PROCEDURE — 3331090001 HH PPS REVENUE CREDIT

## 2019-04-07 PROCEDURE — 3331090001 HH PPS REVENUE CREDIT

## 2019-04-07 PROCEDURE — 3331090002 HH PPS REVENUE DEBIT

## 2019-04-08 VITALS
TEMPERATURE: 98.2 F | SYSTOLIC BLOOD PRESSURE: 162 MMHG | OXYGEN SATURATION: 98 % | HEART RATE: 82 BPM | DIASTOLIC BLOOD PRESSURE: 82 MMHG | RESPIRATION RATE: 17 BRPM

## 2019-04-08 PROCEDURE — 3331090002 HH PPS REVENUE DEBIT

## 2019-04-08 PROCEDURE — 3331090001 HH PPS REVENUE CREDIT

## 2019-04-09 ENCOUNTER — HOME CARE VISIT (OUTPATIENT)
Dept: SCHEDULING | Facility: HOME HEALTH | Age: 64
End: 2019-04-09
Payer: MEDICARE

## 2019-04-09 VITALS
TEMPERATURE: 98.7 F | RESPIRATION RATE: 18 BRPM | DIASTOLIC BLOOD PRESSURE: 84 MMHG | HEART RATE: 71 BPM | SYSTOLIC BLOOD PRESSURE: 156 MMHG | OXYGEN SATURATION: 98 %

## 2019-04-09 PROCEDURE — 3331090001 HH PPS REVENUE CREDIT

## 2019-04-09 PROCEDURE — 3331090002 HH PPS REVENUE DEBIT

## 2019-04-09 PROCEDURE — G0157 HHC PT ASSISTANT EA 15: HCPCS

## 2019-04-10 ENCOUNTER — HOME CARE VISIT (OUTPATIENT)
Dept: HOME HEALTH SERVICES | Facility: HOME HEALTH | Age: 64
End: 2019-04-10
Payer: MEDICARE

## 2019-04-10 PROCEDURE — 3331090001 HH PPS REVENUE CREDIT

## 2019-04-10 PROCEDURE — 3331090002 HH PPS REVENUE DEBIT

## 2019-04-11 ENCOUNTER — HOME CARE VISIT (OUTPATIENT)
Dept: SCHEDULING | Facility: HOME HEALTH | Age: 64
End: 2019-04-11
Payer: MEDICARE

## 2019-04-11 VITALS
SYSTOLIC BLOOD PRESSURE: 146 MMHG | DIASTOLIC BLOOD PRESSURE: 77 MMHG | TEMPERATURE: 96.6 F | HEART RATE: 65 BPM | OXYGEN SATURATION: 98 %

## 2019-04-11 PROCEDURE — 3331090002 HH PPS REVENUE DEBIT

## 2019-04-11 PROCEDURE — 3331090001 HH PPS REVENUE CREDIT

## 2019-04-11 PROCEDURE — G0152 HHCP-SERV OF OT,EA 15 MIN: HCPCS

## 2019-04-11 PROCEDURE — G0157 HHC PT ASSISTANT EA 15: HCPCS

## 2019-04-12 PROCEDURE — 3331090002 HH PPS REVENUE DEBIT

## 2019-04-12 PROCEDURE — 3331090001 HH PPS REVENUE CREDIT

## 2019-04-13 PROCEDURE — 3331090002 HH PPS REVENUE DEBIT

## 2019-04-13 PROCEDURE — 3331090001 HH PPS REVENUE CREDIT

## 2019-04-14 PROCEDURE — 3331090002 HH PPS REVENUE DEBIT

## 2019-04-14 PROCEDURE — 3331090001 HH PPS REVENUE CREDIT

## 2019-04-15 VITALS
RESPIRATION RATE: 18 BRPM | TEMPERATURE: 98.4 F | HEART RATE: 78 BPM | DIASTOLIC BLOOD PRESSURE: 80 MMHG | OXYGEN SATURATION: 98 % | SYSTOLIC BLOOD PRESSURE: 140 MMHG

## 2019-04-15 PROCEDURE — 3331090001 HH PPS REVENUE CREDIT

## 2019-04-15 PROCEDURE — 3331090002 HH PPS REVENUE DEBIT

## 2019-04-16 ENCOUNTER — HOME CARE VISIT (OUTPATIENT)
Dept: SCHEDULING | Facility: HOME HEALTH | Age: 64
End: 2019-04-16
Payer: MEDICARE

## 2019-04-16 VITALS
OXYGEN SATURATION: 98 % | SYSTOLIC BLOOD PRESSURE: 150 MMHG | HEART RATE: 88 BPM | TEMPERATURE: 98.5 F | DIASTOLIC BLOOD PRESSURE: 78 MMHG

## 2019-04-16 PROCEDURE — 3331090002 HH PPS REVENUE DEBIT

## 2019-04-16 PROCEDURE — G0157 HHC PT ASSISTANT EA 15: HCPCS

## 2019-04-16 PROCEDURE — 3331090001 HH PPS REVENUE CREDIT

## 2019-04-17 PROCEDURE — 3331090002 HH PPS REVENUE DEBIT

## 2019-04-17 PROCEDURE — 3331090001 HH PPS REVENUE CREDIT

## 2019-04-18 ENCOUNTER — HOME CARE VISIT (OUTPATIENT)
Dept: SCHEDULING | Facility: HOME HEALTH | Age: 64
End: 2019-04-18
Payer: MEDICARE

## 2019-04-18 VITALS
OXYGEN SATURATION: 19 % | TEMPERATURE: 98.4 F | SYSTOLIC BLOOD PRESSURE: 160 MMHG | DIASTOLIC BLOOD PRESSURE: 82 MMHG | HEART RATE: 64 BPM

## 2019-04-18 PROCEDURE — G0151 HHCP-SERV OF PT,EA 15 MIN: HCPCS

## 2019-04-18 PROCEDURE — 3331090001 HH PPS REVENUE CREDIT

## 2019-04-18 PROCEDURE — 3331090002 HH PPS REVENUE DEBIT

## 2019-04-19 PROCEDURE — 3331090001 HH PPS REVENUE CREDIT

## 2019-04-19 PROCEDURE — 3331090002 HH PPS REVENUE DEBIT

## 2019-04-20 PROCEDURE — 3331090002 HH PPS REVENUE DEBIT

## 2019-04-20 PROCEDURE — 3331090001 HH PPS REVENUE CREDIT

## 2019-04-21 PROCEDURE — 3331090002 HH PPS REVENUE DEBIT

## 2019-04-21 PROCEDURE — 3331090001 HH PPS REVENUE CREDIT

## 2019-04-22 ENCOUNTER — PATIENT OUTREACH (OUTPATIENT)
Dept: INTERNAL MEDICINE CLINIC | Age: 64
End: 2019-04-22

## 2019-04-22 VITALS
SYSTOLIC BLOOD PRESSURE: 144 MMHG | DIASTOLIC BLOOD PRESSURE: 62 MMHG | OXYGEN SATURATION: 97 % | TEMPERATURE: 98.3 F | HEART RATE: 67 BPM

## 2019-04-22 NOTE — PROGRESS NOTES
Complex Case Management  Follow-Up Date/Time:  4/22/2019 1:10 PM 
  
Patient returned call to NN (Nurse Navigator) for Complex Case Management  follow up. Patient reported:  
Dialysis going well ([facility] in [place] MWF; chair time 7:45 AM) Occasional back pain; 2/10 nagging pain Back pain worse with getting up and down too much or bending over too much Doing home exercises Walking with cane Weight at dialysis today 76.2 kg (this was after treatment); reports he takes weight on non dialysis days but unable to provide weights Sometimes gets dizziness when getting up from lying down Pertinent negatives: SOB 
CP Swelling/edema Numbness Pain in legs Increased weakness Medications:  
New medications since last outreach: no 
Does patient need refills on any medications: no 
Medication changes since last outreach (dose adjustments or discontinued meds): no 
 
Home Health company: [facility] Date of discharge: 4/18/19 Barriers to care? None at this time. Specialist appointments since last outreach? no 
If so, specialist and date: N/A Patient verbalized he is on the list for a kidney. Has appt with Dr. Kelsey New for a cath to make sure there is blockages. Appt was changed from 4/25/19 to 5/2/19 as per patient (chart indicates 4/25/19). Patient reminded that there are physicians on call 24 hours a day / 7 days a week (M-F 5pm to 8am and from Friday 5pm until Monday 8a for the weekend) should the patient have questions or concerns. Future Appointments Date Time Provider Tamir Santillan 2/14/2020  8:05 AM [facility] NURSE VISIT [facility] CHRISTY MONTGOMERY  
2/21/2020  2:00 PM Dara Castillo MD [address] Other upcoming appointments: 
Dr. Kelsey LANZA [facility]) 5/2/19 Goals  Prevent complications post hospitalization. No admissions post 60 days from discharge of 3/22/19. Initial Care Planning/Transition of Care GOAL:  PREVENT CONDITION WORSENING Tasks: 
Ensure provider appt is scheduled within 7 days post-discharge; patient aware of PCP appt on 3/28 and the importance of attending appt Confirm patient attended post-discharge provider appt; 3/28: Patient attended PCP appt as scheduled. Review/educate common or potential \"red flags\" of condition worsening; patient education on red flags of stroke and to report any worsening or concerning s/s. 4/22: Patient denied decline in health at this time. Assess for health risk behaviors and educate patient/caregivers on reducing risk Instruct on adherence to medications as ordered and assess for therapeutic response and side-effects; patient started newly prescribed meds on Sunday. Monitor lab results and symptoms, escalate to provider; hx of chronic anemia; H/H stable. 4/22: No new labs Ensure DME in place and used as instructed; N/A Evaluate adherence to medications and priority barriers to resolve;will continue to monitor GOAL: REDUCE RISK FOR HOSPITALIZATION Tasks: 
Complete post-visit call to confirm attendance and update care needs; will continue to monitor/assess Communicate visits and goals between multiple providers and services; N/A Review the Home Visit or Home Health documentation for coordinating care and goals; 4/22: Patient discharged from PT on 4/18. Was evaluated for OT but patient was not needed. Consult for polypharmacy (pharmacist or provider); N/A Coordinate plan to treat at home when symptoms arise; 4/22: Has had no further s/s. Observe for trends in symptoms and measures, provide direction to patient, and notify provider as needed Discuss and provide resources for ACP; will follow up at a later time. 4/22: Patient reported he has a AMD and will bring it to office.

## 2019-04-22 NOTE — PROGRESS NOTES
Complex Case Management  Follow-Up Date/Time:  4/22/2019 1:04 PM 
  
NN contacted patient for Complex Case Management  follow up. Spoke to [employer], wife. Introduced self/role and reason for call. Wife verbalized patient is still at dialysis but is usually home by now. Thinks  is running late. Writer requested patient return call. Contact information provided.

## 2019-04-25 RX ORDER — NIFEDIPINE 90 MG/1
90 TABLET, EXTENDED RELEASE ORAL DAILY
Qty: 90 TAB | Refills: 3 | Status: SHIPPED | OUTPATIENT
Start: 2019-04-25

## 2019-04-25 NOTE — TELEPHONE ENCOUNTER
Last Visit: 3/28/19 with MD Jan Sanots  Next Appointment: 2/21/20 with MD Jan Santos    Requested Prescriptions     Pending Prescriptions Disp Refills    NIFEdipine ER (PROCARDIA XL) 90 mg ER tablet 90 Tab 3     Sig: Take 1 Tab by mouth daily.

## 2019-05-09 ENCOUNTER — PATIENT OUTREACH (OUTPATIENT)
Dept: FAMILY MEDICINE CLINIC | Age: 64
End: 2019-05-09

## 2019-05-09 NOTE — PROGRESS NOTES
Hospital Discharge Follow-Up Date/Time:  2019 1:40 PM 
 
Patient Outreach made to patient. Patient states he is doing fine. He denies needing any assistance from Dr. Lisandro Panchal at this time. Patient seen on 19 for cardiac cath by cardiologist Dr. Cole Araya. Top Challenges reviewed with the provider  
none Method of communication with provider :none Nurse Navigator (NN) contacted the patient by telephone to perform post hospital discharge assessment. Verified name and  with patient as identifiers. Provided introduction to self, and explanation of the Nurse Navigator role. Patient given an opportunity to ask questions and does not have any further questions or concerns at this time. The patient agrees to contact the PCP office for questions related to their healthcare. NN provided contact information for future reference. Summary of patient's top problems: 1. Leg weakness 2.  
3.  
 
Barriers to care? none Current Outpatient Medications Medication Sig  
 NIFEdipine ER (PROCARDIA XL) 90 mg ER tablet Take 1 Tab by mouth daily.  tiZANidine (ZANAFLEX) 4 mg tablet Take 1 Tab by mouth every six (6) hours as needed for Other (spasms).  atorvastatin (LIPITOR) 40 mg tablet Take 1 Tab by mouth daily.  DUREZOL 0.05 % ophthalmic emulsion INSTILL 1 DROP LEFT EYE TID AS DIRECTED  AURYXIA 210 mg iron tablet Take 210 mg by mouth three (3) times daily (with meals).  cinacalcet (SENSIPAR) 60 mg tab Take 60 mg by mouth nightly.  pantoprazole (PROTONIX) 40 mg tablet TAKE 1 TABLET BY MOUTH TWICE DAILY  isosorbide dinitrate (ISORDIL) 20 mg tablet Take 1 Tab by mouth two (2) times a day.  cloNIDine HCl (CATAPRES) 0.1 mg tablet Take  by mouth two (2) times a day.  docusate sodium (COLACE) 100 mg capsule Take 100 mg by mouth two (2) times a day.   
 timolol (TIMOPTIC) 0.5 % ophthalmic solution Administer 1 Drop to both eyes two (2) times a day. MD discontinued as of 4/18/19.  aspirin delayed-release 81 mg tablet Take 81 mg by mouth daily.  carvedilol (COREG) 25 mg tablet Take 25 mg by mouth two (2) times a day.  MULTIVITAMIN PO Take  by mouth daily. No current facility-administered medications for this visit. There are no discontinued medications. [facility] follow up appointment(s):  
Future Appointments Date Time Provider Tamir Jacque 2/14/2020  8:05 AM [facility] NURSE VISIT [facility] CHRISTY MONTGOMERY  
2/21/2020  2:00 PM Anna Nunes MD [address] Goals  Prevent complications post hospitalization. No admissions post 60 days from discharge of 3/22/19. Initial Care Planning/Transition of Care GOAL:  PREVENT CONDITION WORSENING Tasks: 
Ensure provider appt is scheduled within 7 days post-discharge; patient aware of PCP appt on 3/28 and the importance of attending appt Confirm patient attended post-discharge provider appt; 3/28: Patient attended PCP appt as scheduled. Review/educate common or potential \"red flags\" of condition worsening; patient education on red flags of stroke and to report any worsening or concerning s/s. 4/22: Patient denied decline in health at this time. Assess for health risk behaviors and educate patient/caregivers on reducing risk Instruct on adherence to medications as ordered and assess for therapeutic response and side-effects; patient started newly prescribed meds on Sunday. Monitor lab results and symptoms, escalate to provider; hx of chronic anemia; H/H stable. 4/22: No new labs Ensure DME in place and used as instructed; N/A Evaluate adherence to medications and priority barriers to resolve;will continue to monitor GOAL: REDUCE RISK FOR HOSPITALIZATION Tasks: 
Complete post-visit call to confirm attendance and update care needs; will continue to monitor/assess Communicate visits and goals between multiple providers and services; N/A Review the Home Visit or Home Health documentation for coordinating care and goals; 4/22: Patient discharged from PT on 4/18. Was evaluated for OT but patient was not needed. Consult for polypharmacy (pharmacist or provider); N/A Coordinate plan to treat at home when symptoms arise; 4/22: Has had no further s/s. Observe for trends in symptoms and measures, provide direction to patient, and notify provider as needed Discuss and provide resources for ACP; will follow up at a later time. 4/22: Patient reported he has a AMD and will bring it to office.

## 2019-05-30 ENCOUNTER — PATIENT OUTREACH (OUTPATIENT)
Dept: INTERNAL MEDICINE CLINIC | Age: 64
End: 2019-05-30

## 2019-05-30 NOTE — PROGRESS NOTES
Patient has graduated from the Complex Case Management  program on 5/30/19. Patient's symptoms are stable at this time. Patient reported cardiac cath did not indicate any blockages. Patient denied any more leg pain and is using cane. Denied any falls. Patient/family has the ability to self-manage. Care management goals have been completed at this time. No further nurse navigator follow up scheduled. Goals Addressed                 This Visit's Progress     COMPLETED: Prevent complications post hospitalization. On track     No admissions post 60 days from discharge of 3/22/19. Initial Care Planning/Transition of Care  GOAL:  PREVENT CONDITION WORSENING  Ensure provider appt is scheduled within 7 days post-discharge; patient aware of PCP appt on 3/28 and the importance of attending appt   Confirm patient attended post-discharge provider appt; 3/28: Patient attended PCP appt as scheduled. Review/educate common or potential \"red flags\" of condition worsening; patient education on red flags of stroke and to report any worsening or concerning s/s. 4/22: Patient denied decline in health at this time. Instruct on adherence to medications as ordered and assess for therapeutic response and side-effects; patient started newly prescribed meds on Sunday. Monitor lab results and symptoms, escalate to provider; hx of chronic anemia; H/H stable. 4/22: No new labs      Ensure DME in place and used as instructed; N/A      Evaluate adherence to medications and priority barriers to resolve;will continue to monitor      GOAL: [address]  Tasks:  Complete post-visit call to confirm attendance and update care needs; will continue to monitor/assess    Communicate visits and goals between multiple providers and services; N/A     Review the Home Visit or Home Health documentation for coordinating care and goals; 4/22: Patient discharged from PT on 4/18.  Was evaluated for OT but patient was not needed. Consult for polypharmacy (pharmacist or provider); N/A   Coordinate plan to treat at home when symptoms arise; 4/22: Has had no further s/s. Discuss and provide resources for ACP; will follow up at a later time. 4/22: Patient reported he has a AMD and will bring it to office. Pt has nurse navigator's contact information for any further questions, concerns, or needs.   Patients upcoming visits:    Future Appointments   Date Time Provider Tamir Santillan   2/14/2020  8:05 AM [facility] NURSE VISIT [facility]   2/21/2020  2:00 PM hCiara Baumann MD [facility]

## 2019-06-12 DIAGNOSIS — K21.9 GERD WITHOUT ESOPHAGITIS: ICD-10-CM

## 2019-06-12 RX ORDER — PANTOPRAZOLE SODIUM 40 MG/1
TABLET, DELAYED RELEASE ORAL
Qty: 180 TAB | Refills: 0 | Status: SHIPPED | OUTPATIENT
Start: 2019-06-12 | End: 2020-02-23

## 2019-12-12 ENCOUNTER — OFFICE VISIT (OUTPATIENT)
Dept: INTERNAL MEDICINE CLINIC | Age: 64
End: 2019-12-12

## 2019-12-12 VITALS
OXYGEN SATURATION: 98 % | HEART RATE: 74 BPM | HEIGHT: 71 IN | SYSTOLIC BLOOD PRESSURE: 156 MMHG | TEMPERATURE: 98.3 F | DIASTOLIC BLOOD PRESSURE: 79 MMHG | WEIGHT: 160 LBS | RESPIRATION RATE: 14 BRPM | BODY MASS INDEX: 22.4 KG/M2

## 2019-12-12 DIAGNOSIS — E11.319 DIABETIC RETINOPATHY ASSOCIATED WITH TYPE 2 DIABETES MELLITUS, MACULAR EDEMA PRESENCE UNSPECIFIED, UNSPECIFIED LATERALITY, UNSPECIFIED RETINOPATHY SEVERITY (HCC): ICD-10-CM

## 2019-12-12 DIAGNOSIS — Z94.0 S/P KIDNEY TRANSPLANT: ICD-10-CM

## 2019-12-12 DIAGNOSIS — I49.9 IRREGULAR HEARTBEAT: Primary | ICD-10-CM

## 2019-12-12 DIAGNOSIS — E11.21 TYPE 2 DIABETES WITH NEPHROPATHY (HCC): ICD-10-CM

## 2019-12-12 DIAGNOSIS — E11.40 TYPE 2 DIABETES, CONTROLLED, WITH NEUROPATHY (HCC): ICD-10-CM

## 2019-12-12 DIAGNOSIS — I10 PRIMARY HYPERTENSION: ICD-10-CM

## 2019-12-12 DIAGNOSIS — E78.5 DYSLIPIDEMIA: ICD-10-CM

## 2019-12-12 DIAGNOSIS — G47.33 OBSTRUCTIVE SLEEP APNEA SYNDROME: ICD-10-CM

## 2019-12-12 RX ORDER — LANOLIN ALCOHOL/MO/W.PET/CERES
400 CREAM (GRAM) TOPICAL DAILY
COMMUNITY

## 2019-12-12 RX ORDER — BRIMONIDINE TARTRATE, TIMOLOL MALEATE 2; 5 MG/ML; MG/ML
1 SOLUTION/ DROPS OPHTHALMIC EVERY 12 HOURS
COMMUNITY

## 2019-12-12 RX ORDER — ALUMINA, MAGNESIA, AND SIMETHICONE 2400; 2400; 240 MG/30ML; MG/30ML; MG/30ML
5 SUSPENSION ORAL
COMMUNITY
End: 2020-02-23

## 2019-12-12 RX ORDER — TACROLIMUS 1 MG/1
CAPSULE ORAL EVERY 12 HOURS
COMMUNITY

## 2019-12-12 RX ORDER — DAPSONE 100 MG/1
100 TABLET ORAL DAILY
COMMUNITY

## 2019-12-12 NOTE — PROGRESS NOTES
[place] presents today for   Chief Complaint   Patient presents with    Irregular Heart Beat     went to Dr Ron Castillo 1 month ago advised patient to come be seen, this was happening after kidney transplant. pt stated since then it has subsided and has not had any irregular heart beats in last month              Depression Screening:  3 most recent PHQ Screens 3/28/2019   Little interest or pleasure in doing things Not at all   Feeling down, depressed, irritable, or hopeless Not at all   Total Score PHQ 2 0       Learning Assessment:  Learning Assessment 2/15/2019   PRIMARY LEARNER Patient   HIGHEST LEVEL OF EDUCATION - PRIMARY LEARNER  GRADUATED HIGH SCHOOL OR GED   BARRIERS PRIMARY LEARNER NONE   CO-LEARNER CAREGIVER No   PRIMARY LANGUAGE ENGLISH   LEARNER PREFERENCE PRIMARY DEMONSTRATION   ANSWERED BY patient   RELATIONSHIP SELF       Abuse Screening:  Abuse Screening Questionnaire 2/15/2019   Do you ever feel afraid of your partner? N   Are you in a relationship with someone who physically or mentally threatens you? N   Is it safe for you to go home? Y       Fall Risk  Fall Risk Assessment, last 12 mths 3/28/2019   Able to walk? Yes   Fall in past 12 months? No     Health Maintenance Due   Topic Date Due    Shingrix Vaccine Age 49> (1 of 2) 11/19/2003           Coordination of Care:  1. Have you been to the ER, urgent care clinic since your last visit? Hospitalized since your last visit? no    2. Have you seen or consulted any other health care providers outside of the [address] [place] [address] since your last visit? Include any pap smears or colon screening.  no

## 2019-12-13 NOTE — PROGRESS NOTES
75 y.o. BLACK OR  male who presents for evaluation. Since we saw him last, he has undergone cadaveric kidney transplant at [facility] in August. He seems to be doing well and he's f/u with transplant team    He is here mainly because Dr Patrica Ramos told him to f/u with us in August to be checked out by us as he heard 'irreg heartbeat'. He was having some intermittent palpitations at that time but those sx have cleared completely the last 4-6 weeks.   No cp, sb, pnd, edema, syncope, presyncope    Otherwise, he's doing well    Past Medical History:   Diagnosis Date    Acute hearing loss of right ear     Acute urinary retention 8/15    Anemia of chronic disease     saw Dr. Ayni Pratt in past    Asbestosis(501)     Chronic edema     Chronic kidney disease     HD- W--    Colon adenoma     Dr. Shira Cloud , 2/15,     Diabetes mellitus ([facility] [address]     w neuropathy and retinopathy Dr. Francois Jung DJD (degenerative joint disease)     Dyslipidemia     Erectile dysfunction     ESRD on hemodialysis ([facility] [address]     Dr. Brianne Ng, -W-    FHx: heart disease     GERD (gastroesophageal reflux disease)     Glaucoma     Dr. Claus Crain failure [facility])     HLD (hyperlipidemia)     HTN (hypertension)     Kidney stone     S/P kidney transplant 2019    [facility]    Sarcoidosis     Sleep apnea     NOT using cpap (states resolved)    Thyroid disease     Vitamin D deficiency      Past Surgical History:   Procedure Laterality Date    CARDIAC SURG PROCEDURE UNLIST  2017    cath Dr Nikki Bryant; diffuse luminal irregularities without stenosis Cv, LAD, 20-30% mid RCA     COLONOSCOPY N/A 4/10/2018    Dr. Shira Cloud adenoma , 2/15; adenoma and hyperplastic 4/10/18    HX CATARACT REMOVAL      HX ORTHOPAEDIC  2018    left wrist and humerus fx Dr Renetta Beckford  9/15    cystoscopy negative Dr Bell Humphreys  2019    renal transplant from  donor Dr. Remy Moyer [facility]   Juanito Marinelli UROLOGICAL  08/10/2019    exploratinon of kidney transplant w/ evac of subcutaneous hematoma, core biopsy of transplanted kidney, irrigation and evac of bladder clots Dr. Rojas [facility] [address] [place] S&I N/A 8/1/2018    Fistulogram Left performed by Bruce Wright MD at [facility]   [address] TEST THALLIUM STUDY  2009    negative    VASCULAR SURGERY PROCEDURE UNLIST  3/14    nl BALDEMAR bilat    VASCULAR SURGERY PROCEDURE UNLIST      LEFT ARM GRAFT FOR HD     Social History     Socioeconomic History    Marital status:      Spouse name: Not on file    Number of children: 3    Years of education: Not on file    Highest education level: Not on file   Occupational History    Occupation: [employer]   Social Needs    Financial resource strain: Not on file    Food insecurity:     Worry: Not on file     Inability: Not on file    Transportation needs:     Medical: Not on file     Non-medical: Not on file   Tobacco Use    Smoking status: Never Smoker    Smokeless tobacco: Never Used   Substance and Sexual Activity    Alcohol use: No    Drug use: No    Sexual activity: Never   Lifestyle    Physical activity:     Days per week: Not on file     Minutes per session: Not on file    Stress: Not on file   Relationships    Social connections:     Talks on phone: Not on file     Gets together: Not on file     Attends Yazidi service: Not on file     Active member of club or organization: Not on file     Attends meetings of clubs or organizations: Not on file     Relationship status: Not on file    Intimate partner violence:     Fear of current or ex partner: Not on file     Emotionally abused: Not on file     Physically abused: Not on file     Forced sexual activity: Not on file   Other Topics Concern    Not on file   Social History Narrative    Not on file     Current Outpatient Medications   Medication Sig    tacrolimus (PROGRAF) 1 mg capsule Take  by mouth every twelve (12) hours.  dapsone 100 mg tablet Take 100 mg by mouth daily.  phosphorus (PHOSPHA 250 NEUTRAL) 250 mg tablet Take 1 Tab by mouth three (3) times daily.  brimonidine-timolol (COMBIGAN) 0.2-0.5 % drop ophthalmic solution Administer 1 Drop to both eyes every twelve (12) hours.  magnesium oxide (MAG-OX) 400 mg tablet Take 400 mg by mouth daily.  insulin NPH human isophane (HUMULIN N NPH INSULIN KWIKPEN SC) 22 Units by SubCUTAneous route daily.  aluminum & magnesium hydroxide-simethicone (MAALOX MAXIMUM STRENGTH) 400-400-40 mg/5 mL suspension Take 5 mL by mouth every six (6) hours as needed for Indigestion.  mycophenolic acid (MYFORTIC) 674 mg delayed release tablet Take 180 mg by mouth two (2) times a day.  predniSONE (DELTASONE) 5 mg tablet Take 10 mg by mouth daily.  NIFEdipine ER (PROCARDIA XL) 90 mg ER tablet Take 1 Tab by mouth daily.  atorvastatin (LIPITOR) 40 mg tablet Take 1 Tab by mouth daily.  DUREZOL 0.05 % ophthalmic emulsion Administer  to both eyes daily.  cinacalcet (SENSIPAR) 30 mg tablet Take 30 mg by mouth nightly.  aspirin (ASPIRIN) 325 mg tablet Take 325 mg by mouth daily.  ampicillin (PRINCIPEN) 500 mg capsule     glucose blood VI test strips (ASCENSIA AUTODISC VI, ONE TOUCH ULTRA TEST VI) strip 100 Each.  glipiZIDE (GLUCOTROL) 10 mg tablet Take 10 mg by mouth.  pantoprazole (PROTONIX) 40 mg tablet TAKE 1 TABLET BY MOUTH TWICE DAILY    tiZANidine (ZANAFLEX) 4 mg tablet Take 1 Tab by mouth every six (6) hours as needed for Other (spasms).  AURYXIA 210 mg iron tablet Take 210 mg by mouth three (3) times daily (with meals).  isosorbide dinitrate (ISORDIL) 20 mg tablet Take 1 Tab by mouth two (2) times a day.  cloNIDine HCl (CATAPRES) 0.1 mg tablet Take  by mouth two (2) times a day.  docusate sodium (COLACE) 100 mg capsule Take 100 mg by mouth two (2) times a day.     timolol (TIMOPTIC) 0.5 % ophthalmic solution Administer 1 Drop to both eyes two (2) times a day. MD discontinued as of 4/18/19.  carvedilol (COREG) 25 mg tablet Take 25 mg by mouth two (2) times a day.  MULTIVITAMIN PO Take  by mouth daily. No current facility-administered medications for this visit. No Known Allergies    REVIEW OF SYSTEMS:   Ophtho - no vision change or eye pain  Oral - no mouth pain, tongue or tooth problems  Ears - no hearing loss, ear pain, fullness, no swallowing problems  Cardiac - no CP, PND, orthopnea, edema, palpitations or syncope  Chest - no breast masses  Resp - no wheezing, chronic coughing, dyspnea  GI - no heartburn, nausea, vomiting, change in bowel habits, bleeding, hemorrhoids  Urinary - no dysuria, hematuria, flank pain, urgency, frequency    Visit Vitals  /79   Pulse 74   Temp 98.3 °F (36.8 °C) (Oral)   Resp 14   Ht 5' 11\" (1.803 m)   Wt 160 lb (72.6 kg)   SpO2 98%   BMI 22.32 kg/m²   A&O x3  Affect is appropriate. Mood stable  No apparent distress  Anicteric, no JVD, adenopathy or thyromegaly. No carotid bruits or radiated murmur  Lungs clear to auscultation, no wheezes or rales  Heart showed regular rate and rhythm. No murmur, rubs, gallops  Abdomen soft nontender, no hepatosplenomegaly or masses. Extremities without edema. Pulses 1-2+ symmetrically    EKG showed nsr, freq pvcs, RBBB, bifascicular block    Assessment and plan:  1. Palpitations. As sx have resolved, he is not interested in doing monitoring study. Call if progressive sx  2. DM.  F/U Dr Aurelio Key  3. S/P renal transplant. F/U transplant team      Above conditions discussed at length and patient vocalized understanding.   All questions answered to patient satisfaction

## 2019-12-27 PROBLEM — N18.6 HYPERTENSIVE KIDNEY DISEASE WITH END-STAGE RENAL DISEASE (HCC): Status: ACTIVE | Noted: 2019-12-27

## 2019-12-27 PROBLEM — I12.0 HYPERTENSIVE KIDNEY DISEASE WITH END-STAGE RENAL DISEASE (HCC): Status: ACTIVE | Noted: 2019-12-27

## 2019-12-27 PROBLEM — Q61.3 POLYCYSTIC KIDNEY: Status: ACTIVE | Noted: 2019-10-21

## 2020-02-14 ENCOUNTER — LAB ONLY (OUTPATIENT)
Dept: INTERNAL MEDICINE CLINIC | Age: 65
End: 2020-02-14

## 2020-02-14 ENCOUNTER — HOSPITAL ENCOUNTER (OUTPATIENT)
Dept: LAB | Age: 65
Discharge: HOME | End: 2020-02-14
Payer: MEDICARE

## 2020-02-14 DIAGNOSIS — E78.5 DYSLIPIDEMIA: Primary | ICD-10-CM

## 2020-02-14 DIAGNOSIS — I10 PRIMARY HYPERTENSION: ICD-10-CM

## 2020-02-14 DIAGNOSIS — E78.5 DYSLIPIDEMIA: ICD-10-CM

## 2020-02-14 DIAGNOSIS — E11.21 TYPE 2 DIABETES WITH NEPHROPATHY (HCC): ICD-10-CM

## 2020-02-14 LAB
ALBUMIN SERPL-MCNC: 3.5 G/DL (ref 3.4–5)
ALBUMIN/GLOB SERPL: 1.1 {RATIO} (ref 0.8–1.7)
ALP SERPL-CCNC: 159 U/L (ref 45–117)
ALT SERPL-CCNC: 64 U/L (ref 16–61)
ANION GAP SERPL CALC-SCNC: 4 MMOL/L (ref 3–18)
AST SERPL-CCNC: 33 U/L (ref 10–38)
BASOPHILS # BLD: 0 K/UL (ref 0–0.1)
BASOPHILS NFR BLD: 0 % (ref 0–2)
BILIRUB SERPL-MCNC: 0.7 MG/DL (ref 0.2–1)
BUN SERPL-MCNC: 24 MG/DL (ref 7–18)
BUN/CREAT SERPL: 20 (ref 12–20)
CALCIUM SERPL-MCNC: 9.6 MG/DL (ref 8.5–10.1)
CHLORIDE SERPL-SCNC: 109 MMOL/L (ref 100–111)
CHOLEST SERPL-MCNC: 115 MG/DL
CO2 SERPL-SCNC: 22 MMOL/L (ref 21–32)
CREAT SERPL-MCNC: 1.2 MG/DL (ref 0.6–1.3)
DIFFERENTIAL METHOD BLD: ABNORMAL
EOSINOPHIL # BLD: 0.1 K/UL (ref 0–0.4)
EOSINOPHIL NFR BLD: 1 % (ref 0–5)
ERYTHROCYTE [DISTWIDTH] IN BLOOD BY AUTOMATED COUNT: 15 % (ref 11.6–14.5)
EST. AVERAGE GLUCOSE BLD GHB EST-MCNC: NORMAL MG/DL
GLOBULIN SER CALC-MCNC: 3.2 G/DL (ref 2–4)
GLUCOSE SERPL-MCNC: 97 MG/DL (ref 74–99)
HBA1C MFR BLD: 4.7 % (ref 4.2–5.6)
HCT VFR BLD AUTO: 42 % (ref 36–48)
HDLC SERPL-MCNC: 30 MG/DL (ref 40–60)
HDLC SERPL: 3.8 {RATIO} (ref 0–5)
HGB BLD-MCNC: 13.4 G/DL (ref 13–16)
LDLC SERPL CALC-MCNC: 59.2 MG/DL (ref 0–100)
LIPID PROFILE,FLP: ABNORMAL
LYMPHOCYTES # BLD: 0.6 K/UL (ref 0.9–3.6)
LYMPHOCYTES NFR BLD: 9 % (ref 21–52)
MCH RBC QN AUTO: 30.9 PG (ref 24–34)
MCHC RBC AUTO-ENTMCNC: 31.9 G/DL (ref 31–37)
MCV RBC AUTO: 97 FL (ref 74–97)
MONOCYTES # BLD: 0.9 K/UL (ref 0.05–1.2)
MONOCYTES NFR BLD: 13 % (ref 3–10)
NEUTS SEG # BLD: 5.4 K/UL (ref 1.8–8)
NEUTS SEG NFR BLD: 77 % (ref 40–73)
PLATELET # BLD AUTO: 271 K/UL (ref 135–420)
PMV BLD AUTO: 10.1 FL (ref 9.2–11.8)
POTASSIUM SERPL-SCNC: 5.1 MMOL/L (ref 3.5–5.5)
PROT SERPL-MCNC: 6.7 G/DL (ref 6.4–8.2)
RBC # BLD AUTO: 4.33 M/UL (ref 4.7–5.5)
SODIUM SERPL-SCNC: 135 MMOL/L (ref 136–145)
TRIGL SERPL-MCNC: 129 MG/DL (ref ?–150)
VLDLC SERPL CALC-MCNC: 25.8 MG/DL
WBC # BLD AUTO: 7 K/UL (ref 4.6–13.2)

## 2020-02-14 PROCEDURE — 85025 COMPLETE CBC W/AUTO DIFF WBC: CPT

## 2020-02-14 PROCEDURE — 80061 LIPID PANEL: CPT

## 2020-02-14 PROCEDURE — 36415 COLL VENOUS BLD VENIPUNCTURE: CPT

## 2020-02-14 PROCEDURE — 83036 HEMOGLOBIN GLYCOSYLATED A1C: CPT

## 2020-02-14 PROCEDURE — 80053 COMPREHEN METABOLIC PANEL: CPT

## 2020-02-17 NOTE — PROGRESS NOTES
This is a Subsequent Medicare Annual Wellness Visit     I have reviewed the patient's medical history in detail and updated the computerized patient record.      History     Past Medical History:   Diagnosis Date    Acute hearing loss of right ear     Acute urinary retention 8/15    Anemia of chronic disease     saw Dr. Mery Jenkins in past    Asbestosis(501)     Chronic edema     Colon adenoma     Dr. Lucille Stock , 2/15,     Congest[facility] failure ([facility] [address]     Diabetes mellitus (HCC)     w neuropathy and retinopathy Dr. Jose OLEA (degenerative joint disease)     Dyslipidemia     Erectile dysfunction     ESRD on hemodialysis ([facility] [address]     Dr. Rusty Mckinney, M-W-F; s/p cadaveric renal transplant  [facility]    FHx: heart disease     GERD (gastroesophageal reflux disease)     Glaucoma     Dr. Conor Crawley (hyperlipidemia)     HTN (hypertension)     Kidney stone     Polycystic kidney 10/21/2019    [facility] 2019    [facility]    Sarcoidosis     Sleep apnea     NOT using cpap (states resolved)    Thyroid disease     Vitamin D deficiency       Past Surgical History:   Procedure Laterality Date    CARDIAC SURG PROCEDURE UNLIST  2017    cath Dr Jerry Izaguirre; diffuse luminal irregularities without stenosis Cv, LAD, 20-30% mid RCA     CARDIAC SURG PROCEDURE UNLIST      neg thanllium     COLONOSCOPY N/A 4/10/2018    Dr. Lucille Stock adenoma , 2/15; adenoma and hyperplastic 4/10/18    HX CATARACT REMOVAL      HX ORTHOPAEDIC  2018    left wrist and humerus fx Dr Josiane Headley  9/15    cystoscopy negative Dr Hemal Mcdaniel  2019    renal transplant from  donor Dr. Kristen Hunter  08/10/2019    exploratinon of kidney transplant w/ evac of subcutaneous hematoma, core biopsy of transplanted kidney, irrigation and evac of bladder clots Dr. Herrera Mervat [address] [place] S&I N/A 2018    Fistulogram Left performed by Veda Weaver MD at [address] [facility]    VASCULAR SURGERY PROCEDURE UNLIST  3/14    nl BALDEMAR bilat    VASCULAR SURGERY PROCEDURE UNLIST      LEFT ARM GRAFT FOR HD     Current Outpatient Medications   Medication Sig Dispense Refill    esomeprazole (NEXIUM) 40 mg capsule Take 1 Cap by mouth daily. 90 Cap 3    tacrolimus (PROGRAF) 1 mg capsule Take  by mouth every twelve (12) hours.  dapsone 100 mg tablet Take 100 mg by mouth daily.  phosphorus (PHOSPHA 250 NEUTRAL) 250 mg tablet Take 1 Tab by mouth three (3) times daily.  brimonidine-timolol (COMBIGAN) 0.2-0.5 % drop ophthalmic solution Administer 1 Drop to both eyes every twelve (12) hours.  magnesium oxide (MAG-OX) 400 mg tablet Take 400 mg by mouth daily.  insulin NPH human isophane (HUMULIN N NPH INSULIN KWIKPEN SC) 22 Units by SubCUTAneous route daily.  mycophenolic acid (MYFORTIC) 271 mg delayed release tablet Take 180 mg by mouth two (2) times a day.  predniSONE (DELTASONE) 5 mg tablet Take 10 mg by mouth daily.  NIFEdipine ER (PROCARDIA XL) 90 mg ER tablet Take 1 Tab by mouth daily. 90 Tab 3    atorvastatin (LIPITOR) 40 mg tablet Take 1 Tab by mouth daily. 90 Tab 3    DUREZOL 0.05 % ophthalmic emulsion Administer  to both eyes daily. 3    cinacalcet (SENSIPAR) 60 mg tab Take 60 mg by mouth nightly.  glucose blood VI test strips (ASCENSIA AUTODISC VI, ONE TOUCH ULTRA TEST VI) strip 100 Each.        No Known Allergies  Family History   Problem Relation Age of Onset    Diabetes Father     Heart Disease Sister     Diabetes Brother     Heart Disease Brother     Diabetes Brother     Diabetes Sister      Social History     Tobacco Use    Smoking status: Never Smoker    Smokeless tobacco: Never Used   Substance Use Topics    Alcohol use: No     Depression Risk Factor Screening:     3 most recent PHQ Screens 2/21/2020   Little interest or pleasure in doing things Not at all   Feeling down, depressed, irritable, or hopeless Not at all   Total Score PHQ 2 0     SCREENINGS  Colonoscopy last done 4/18 Dr Vern Mills    Immunization History   Administered Date(s) Administered    (RETIRED) Pneumococcal Vaccine (Unspecified Type) 02/02/2011    Influenza High Dose Vaccine PF 10/01/2017    Influenza Vaccine 12/15/2013, 10/01/2014, 12/31/2018, 12/02/2019    Influenza Vaccine Whole 02/02/2011    Pneumococcal Conjugate (PCV-13) 09/24/2015    Pneumococcal Polysaccharide (PPSV-23) 11/03/2016, 12/20/2016    TD Vaccine 01/01/2007    Zoster Vaccine, Live 03/18/2014     Alcohol Risk Factor Screening: On any occasion during the past 3 months, have you had more than 4 drinks containing alcohol? No    Do you average more than 14 drinks per week? No      Functional Ability and Level of Safety:     Hearing Loss   mild-to-moderate, he has seen Dr Zoraida Rosenbaum in past    Vision - no acute complaints and is followed by Dr Verenice Rizvi of [facility]   Self-care. Requires assistance with: no ADLs    Fall Risk     Fall Risk Assessment, last 12 mths 2/21/2020   Able to walk? Yes   Fall in past 12 months? No     Abuse Screen   Patient is not abused    Review of Systems   A comprehensive review of systems was negative except for that written in the HPI.     Physical Examination     Evaluation of Cognitive Function:  Mood/affect:  neutral  Appearance: age appropriate, well dressed and within normal Limits  Family member/caregiver input: na    Patient Care Team:  Marisa Thakur MD as PCP - General (Internal Medicine)  Marisa Thakur MD as PCP - [facility] Empaneled Provider  Kalyn Paredes MD as Physician (Urology)  Carlos Willams DPM (Podiatry)  Sky Morales MD (Ophthalmology)  Tayla Guthrie MD (Ophthalmology)    Advice/Referrals/Counseling   Education and counseling provided:  Are appropriate based on today's review and evaluation  End-of-Life planning (with patient's consent)  Pneumococcal Vaccine  Influenza Vaccine  Prostate cancer screening tests (PSA, covered annually)  Colorectal cancer screening tests  Cardiovascular screening blood test    Assessment/Plan       ICD-10-CM ICD-9-CM    1. Medicare annual wellness visit, subsequent Z00.00 V70.0    2. GERD without esophagitis K21.9 530.81 esomeprazole (NEXIUM) 40 mg capsule   3. Obstructive sleep apnea syndrome G47.33 327.23    4. Dyslipidemia E78.5 272.4    5. Diabetic retinopathy associated with type 2 diabetes mellitus, macular edema presence unspecified, unspecified laterality, unspecified retinopathy severity ([facility] [address] E11.319 250.50      362.01    6. Type 2 diabetes, controlled, with neuropathy [facility] Dr Willy Oleary E11.40 250.60      357.2    7. Primary hypertension I10 401.9    8. Type 2 diabetes with nephropathy (HCC) E11.21 250.40      583.81    9. S/P kidney transplant Z94.0 V42.0    10. Advanced directives, counseling/discussion Z71.89 V65.49 ADVANCE CARE PLANNING FIRST 30 MINS   11. Screening for alcoholism Z13.39 V79.1 SC ANNUAL ALCOHOL SCREEN 15 MIN   12. Screening for depression Z13.31 V79.0 DEPRESSION SCREEN ANNUAL     current treatment plan is effective, no change in therapy  lab results and schedule of future lab studies reviewed with patient  reviewed diet, exercise and weight control  cardiovascular risk and specific lipid/LDL goals reviewed. End of life discussion undertaken.   Will work on amd  Flu high dose when in season  sachin advocated  Colonoscopy to be scheduled 2023

## 2020-02-17 NOTE — PROGRESS NOTES
75 y.o. BLACK OR  male who presents for f/u evaluation. Doing well since the kidney transplant, following up with [facility] transplant clinic     No cardiovascular complaints. Specifically no palpitations, syncope, presyncope. Trying to be active w walking, he can walk a mile in about 30 min    Denies any GI or  complaints outside of occasional dyspepsia, he is using nexium prn    Denies polyuria, polydipsia, nocturia, vision change.  Weight is stable    LAST MEDICARE WELLNESS EXAM: 9/18/14, 9/24/15, 12/20/16, 3/23/18, 2/15/19, 2/21/20    Past Medical History:   Diagnosis Date    Acute hearing loss of right ear     Acute urinary retention 8/15    Anemia of chronic disease 2010    saw  [facility] in past    Asbestosis(501)     Chronic edema     Colon adenoma     Dr. Jacque Helm 2009, 2/15, 4/18    Congestive heart failure ([address] 2011    Diabetes mellitus [facility]     w neuropathy and retinopathy Dr. Jesus OLEA (degenerative joint disease)     Dyslipidemia     Erectile dysfunction     ESRD on hemodialysis ([facility]     Dr. Ramona Cao, M-W-F; s/p cadaveric renal transplant 2019 [facility]    FHx: heart disease     GERD (gastroesophageal reflux disease)     Glaucoma     Dr. Abigail Burton (hyperlipidemia)     HTN (hypertension)     Kidney stone     Polycystic kidney 10/21/2019    S/P kidney transplant 08/2019    [facility] [place]    Sarcoidosis     Sleep apnea     NOT using cpap (states resolved)    Thyroid disease     Vitamin D deficiency      Past Surgical History:   Procedure Laterality Date    CARDIAC SURG PROCEDURE UNLIST  03/28/2017    cath Dr Mitchell Cook; diffuse luminal irregularities without stenosis Cv, LAD, 20-30% mid RCA     CARDIAC SURG PROCEDURE UNLIST  2009    neg thanllium     COLONOSCOPY N/A 4/10/2018    Dr. Jacque Helm adenoma 2009, 2/15; adenoma and hyperplastic 4/10/18    HX CATARACT REMOVAL      HX ORTHOPAEDIC  01/2018    left wrist and humerus fx Dr Lesley Cavazos  9/15 cystoscopy negative Dr Gale Hathaway  2019    renal [facility] from  donor Dr. Kiana Mcclain [facility]    [address]  08/10/2019    exploratinon of kidney transplant w/ evac of subcutaneous hematoma, core biopsy of transplanted kidney, irrigation and evac of bladder clots Dr. Adrian Mckee DX [address] [place] S&I N/A 2018    Fistulogram Left performed by Sean Cooney MD at [facility]   [address]  3/14    nl BALDEMAR bilat    VASCULAR SURGERY PROCEDURE UNLIST      LEFT ARM GRAFT FOR HD     Social History     Socioeconomic History    Marital status:      Spouse name: Not on file    Number of children: 3    Years of education: Not on file    Highest education level: Not on file   Occupational History    Occupation: [employer]   Social Needs    Financial resource strain: Not on file    Food insecurity:     Worry: Not on file     Inability: Not on file    Transportation needs:     Medical: Not on file     Non-medical: Not on file   Tobacco Use    Smoking status: Never Smoker    Smokeless tobacco: Never Used   Substance and Sexual Activity    Alcohol use: No    Drug use: No    Sexual activity: Never   Lifestyle    Physical activity:     Days per week: Not on file     Minutes per session: Not on file    Stress: Not on file   Relationships    Social connections:     Talks on phone: Not on file     Gets together: Not on file     Attends [employer] service: Not on file     Active member of club or organization: Not on file     Attends meetings of clubs or organizations: Not on file     Relationship status: Not on file    Intimate partner violence:     Fear of current or ex partner: Not on file     Emotionally abused: Not on file     Physically abused: Not on file     Forced sexual activity: Not on file   Other Topics Concern    Not on file   Social History Narrative    Not on file     No Known Allergies    Current Outpatient Medications   Medication Sig    esomeprazole (NEXIUM) 40 mg capsule Take 1 Cap by mouth daily.  tacrolimus (PROGRAF) 1 mg capsule Take  by mouth every twelve (12) hours.  dapsone 100 mg tablet Take 100 mg by mouth daily.  phosphorus (PHOSPHA 250 NEUTRAL) 250 mg tablet Take 1 Tab by mouth three (3) times daily.  brimonidine-timolol (COMBIGAN) 0.2-0.5 % drop ophthalmic solution Administer 1 Drop to both eyes every twelve (12) hours.  magnesium oxide (MAG-OX) 400 mg tablet Take 400 mg by mouth daily.  insulin NPH human isophane (HUMULIN N NPH INSULIN KWIKPEN SC) 22 Units by SubCUTAneous route daily.  mycophenolic acid (MYFORTIC) 245 mg delayed release tablet Take 180 mg by mouth two (2) times a day.  predniSONE (DELTASONE) 5 mg tablet Take 10 mg by mouth daily.  NIFEdipine ER (PROCARDIA XL) 90 mg ER tablet Take 1 Tab by mouth daily.  atorvastatin (LIPITOR) 40 mg tablet Take 1 Tab by mouth daily.  DUREZOL 0.05 % ophthalmic emulsion Administer  to both eyes daily.  cinacalcet (SENSIPAR) 60 mg tab Take 60 mg by mouth nightly.  glucose blood VI test strips (ASCENSIA AUTODISC VI, ONE TOUCH ULTRA TEST VI) strip 100 Each. No current facility-administered medications for this visit.       REVIEW OF SYSTEMS: colo Dr Jemma Sykes 4/18, sees Dr Larry Boswell, Dr Omero Brown in past  Ophtho  no vision change or eye pain  Oral  no mouth pain, tongue or tooth problems  Ears  no hearing loss, ear pain, fullness, no swallowing problems  Cardiac  no CP, PND, orthopnea, edema, palpitations or syncope  Chest  no breast masses  Resp  no wheezing, chronic coughing, dyspnea  GI  no heartburn, nausea, vomiting, change in bowel habits, bleeding, hemorrhoids  Urinary  no dysuria, hematuria, flank pain, urgency, frequency    Visit Vitals  /72   Pulse 87   Temp 98.8 °F (37.1 °C) (Oral)   Resp 14   Ht 5' 11\" (1.803 m)   Wt 167 lb (75.8 kg)   SpO2 96%   BMI 23.29 kg/m²   A&O x3  Affect is appropriate. Mood stable  No apparent distress  Anicteric, no JVD, adenopathy or thyromegaly. No carotid bruits or radiated murmur  Lungs clear to auscultation, no wheezes or rales   Heart --Regular rate and rhythm, 2/6 ERNESTINA lsb without radiation, no rubs, gallops, or clicks. Abdomen -- Soft and nontender, no hepatosplenomegaly or masses. Extremities -- Without cyanosis, clubbing. 2+ pulses equally and bilaterally.   No edema noted, LUE AV fistula w thrill and bruit  Pulses 1-2+ DP and PT    LABS  From 2/11 showed   gluc 131, cr 4.5,             hba1c 6.1,                   chol 187, tg 154, hdl 25, ldl-c 131, wbc 9.8,  hb 8.7,   plt 374, psa 1.00  From 11/11 showed gluc 114,              hba1c 5.6,             chol 157, tg 157, hdl 36, ldl-c 90  From 5/12 showed     alt 13, hba1c 6.1, ldl-p 1661, chol 184, tg 192, hdl 27, ldl-c 119  From 11/12 showed gluc 130, cr 6.0, gfr 11, alt 9,   hba1c 5.4, ldl-p 884,            wbc 8.0,  hb 10.3  plt 324  From 8/13 showed   gluc 144, cr 8.3,  alt<5,  hba1c 5.7,             chol 169, tg 327, hdl 20, ldl-c 84   From 3/14 showed   gluc 125, cr 6.9,  alt<5,  hba1c 5.8,             chol 141, tg 233, hdl 24, ldl-c 70,  wbc 5.7,   hb 10.4, plt 239  From 9/14 showed                hba1c 5.7,                        psa 1.60, vit d 32.8  From 8/15 showed   gluc 151,   alt 14,               wbc 11.9, hb 11.5, plt 189, lip 283  From 9/15 showed   gluc 110, cr 6.51, gfr 9, alt<5,  hba1c 5.6,             chol 182, tg 219, hdl 26, ldl-c 112, wbc 4.5,   hb 11.7, plt 245  From 2/16 showed                hba1c 5.6,             chol 183, tg 169, hdl 34, ldl-c 115  Form 6/16 showed   gluc 124, cr 8.22,             hba1c 6.0,             chol 140, tg 184, hdl 30, ldl-c 73  From 12/16 showed               alt 15, hba1c 4.8,             chol 138, tg 138, hdl 33, ldl-c 77  From 3/18 showed   gluc 103,   alt 13, hba1c 4.6,                   chol 150, tg 108, hdl 35, ldl-c 93  From 2/19 showed   gluc 91,    alt 18, hba1c 5.5,             chol 216, tg 169, hdl 33, ldl-c 149, wbc 4.9, hb 12.9, plt 265    Results for orders placed or performed during the hospital encounter of 63/26/89   METABOLIC PANEL, COMPREHENSIVE   Result Value Ref Range    Sodium 135 (L) 136 - 145 mmol/L    Potassium 5.1 3.5 - 5.5 mmol/L    Chloride 109 100 - 111 mmol/L    CO2 22 21 - 32 mmol/L    Anion gap 4 3.0 - 18 mmol/L    Glucose 97 74 - 99 mg/dL    BUN 24 (H) 7.0 - 18 MG/DL    Creatinine 1.20 0.6 - 1.3 MG/DL    BUN/Creatinine ratio 20 12 - 20      GFR est AA >60 >60 ml/min/1.73m2    GFR est non-AA >60 >60 ml/min/1.73m2    Calcium 9.6 8.5 - 10.1 MG/DL    Bilirubin, total 0.7 0.2 - 1.0 MG/DL    ALT (SGPT) 64 (H) 16 - 61 U/L    AST (SGOT) 33 10 - 38 U/L    Alk. phosphatase 159 (H) 45 - 117 U/L    Protein, total 6.7 6.4 - 8.2 g/dL    Albumin 3.5 3.4 - 5.0 g/dL    Globulin 3.2 2.0 - 4.0 g/dL    A-G Ratio 1.1 0.8 - 1.7     LIPID PANEL   Result Value Ref Range    LIPID PROFILE          Cholesterol, total 115 <200 MG/DL    Triglyceride 129 <150 MG/DL    HDL Cholesterol 30 (L) 40 - 60 MG/DL    LDL, calculated 59.2 0 - 100 MG/DL    VLDL, calculated 25.8 MG/DL    CHOL/HDL Ratio 3.8 0 - 5.0     CBC WITH AUTOMATED DIFF   Result Value Ref Range    WBC 7.0 4.6 - 13.2 K/uL    RBC 4.33 (L) 4.70 - 5.50 M/uL    HGB 13.4 13.0 - 16.0 g/dL    HCT 42.0 36.0 - 48.0 %    MCV 97.0 74.0 - 97.0 FL    MCH 30.9 24.0 - 34.0 PG    MCHC 31.9 31.0 - 37.0 g/dL    RDW 15.0 (H) 11.6 - 14.5 %    PLATELET 072 130 - 223 K/uL    MPV 10.1 9.2 - 11.8 FL    NEUTROPHILS 77 (H) 40 - 73 %    LYMPHOCYTES 9 (L) 21 - 52 %    MONOCYTES 13 (H) 3 - 10 %    EOSINOPHILS 1 0 - 5 %    BASOPHILS 0 0 - 2 %    ABS. NEUTROPHILS 5.4 1.8 - 8.0 K/UL    ABS. LYMPHOCYTES 0.6 (L) 0.9 - 3.6 K/UL    ABS. MONOCYTES 0.9 0.05 - 1.2 K/UL    ABS. EOSINOPHILS 0.1 0.0 - 0.4 K/UL    ABS.  BASOPHILS 0.0 0.0 - 0.1 K/UL    DF AUTOMATED     HEMOGLOBIN A1C WITH EAG   Result Value Ref Range Hemoglobin A1c 4.7 4.2 - 5.6 %    Est. average glucose Cannot be calculated mg/dL     Patient Active Problem List   Diagnosis Code    Sleep apnea Dr. Swati Moore G47.30    Colon adenoma 2/15 Dr. Ana Corrales D12.6    Erectile dysfunction N52.9    Sarcoidosis D86.9    Dyslipidemia E78.5    Primary hypertension I10    GERD without esophagitis K21.9    Arthritis, degenerative M19.90    Advance directive discussed with patient Z71.89    Diabetic retinopathy ([facility] E11.319    Type 2 diabetes, controlled, with neuropathy ([facility] Dr Angel Ha E11.40    Type 2 diabetes with nephropathy ([facility] E11.21    S/P kidney transplant Z94.0     ASSESSMENT AND PLAN:  1. DM.  Well-controlled, f/u Dr. Viry Joyce and Dr Brayden Strange  2. Hypertension. Continue current   3. Hyperlipidemia. He's off statin at this time  4. ESRD, now s/p renal transplant. f/u transplant clinic  5. Colon polyps. Fiber, colo 2023  6. Ortho. F/U Dr Pako Palacios  7. Sleep apnea. Off cpap. F/U Dr. Swati Moore as needed  8. Ophtho. F/U Dr. Jody Warren  9. GERD. Daily ppi, avoidance measures              RTC 8/20    Above conditions discussed at length and patient vocalized understanding.   All questions answered to patient satisfaction

## 2020-02-21 ENCOUNTER — OFFICE VISIT (OUTPATIENT)
Dept: INTERNAL MEDICINE CLINIC | Age: 65
End: 2020-02-21

## 2020-02-21 VITALS
TEMPERATURE: 98.8 F | HEIGHT: 71 IN | RESPIRATION RATE: 14 BRPM | HEART RATE: 87 BPM | OXYGEN SATURATION: 96 % | DIASTOLIC BLOOD PRESSURE: 72 MMHG | WEIGHT: 167 LBS | BODY MASS INDEX: 23.38 KG/M2 | SYSTOLIC BLOOD PRESSURE: 142 MMHG

## 2020-02-21 DIAGNOSIS — I10 PRIMARY HYPERTENSION: ICD-10-CM

## 2020-02-21 DIAGNOSIS — E11.319 DIABETIC RETINOPATHY ASSOCIATED WITH TYPE 2 DIABETES MELLITUS, MACULAR EDEMA PRESENCE UNSPECIFIED, UNSPECIFIED LATERALITY, UNSPECIFIED RETINOPATHY SEVERITY (HCC): ICD-10-CM

## 2020-02-21 DIAGNOSIS — E11.21 TYPE 2 DIABETES WITH NEPHROPATHY (HCC): ICD-10-CM

## 2020-02-21 DIAGNOSIS — Z13.31 SCREENING FOR DEPRESSION: ICD-10-CM

## 2020-02-21 DIAGNOSIS — E11.40 TYPE 2 DIABETES, CONTROLLED, WITH NEUROPATHY (HCC): ICD-10-CM

## 2020-02-21 DIAGNOSIS — E78.5 DYSLIPIDEMIA: ICD-10-CM

## 2020-02-21 DIAGNOSIS — K21.9 GERD WITHOUT ESOPHAGITIS: ICD-10-CM

## 2020-02-21 DIAGNOSIS — Z00.00 MEDICARE ANNUAL WELLNESS VISIT, SUBSEQUENT: Primary | ICD-10-CM

## 2020-02-21 DIAGNOSIS — G47.33 OBSTRUCTIVE SLEEP APNEA SYNDROME: ICD-10-CM

## 2020-02-21 DIAGNOSIS — Z94.0 S/P KIDNEY TRANSPLANT: ICD-10-CM

## 2020-02-21 DIAGNOSIS — Z71.89 ADVANCED DIRECTIVES, COUNSELING/DISCUSSION: ICD-10-CM

## 2020-02-21 DIAGNOSIS — Z13.39 SCREENING FOR ALCOHOLISM: ICD-10-CM

## 2020-02-21 NOTE — PROGRESS NOTES
Ana Malone presents today for No chief complaint on file. Depression Screening:  3 most recent PHQ Screens 3/28/2019   Little interest or pleasure in doing things Not at all   Feeling down, depressed, irritable, or hopeless Not at all   Total Score PHQ 2 0       Learning Assessment:  Learning Assessment 2/15/2019   PRIMARY LEARNER Patient   HIGHEST LEVEL OF EDUCATION - PRIMARY LEARNER  GRADUATED HIGH SCHOOL OR GED   BARRIERS PRIMARY LEARNER NONE   CO-LEARNER CAREGIVER No   PRIMARY LANGUAGE ENGLISH   LEARNER PREFERENCE PRIMARY DEMONSTRATION   ANSWERED BY patient   RELATIONSHIP SELF       Abuse Screening:  Abuse Screening Questionnaire 2/15/2019   Do you ever feel afraid of your partner? N   Are you in a relationship with someone who physically or mentally threatens you? N   Is it safe for you to go home? Y       Fall Risk  Fall Risk Assessment, last 12 mths 3/28/2019   Able to walk? Yes   Fall in past 12 months? No           Coordination of Care:  1. Have you been to the ER, urgent care clinic since your last visit? Hospitalized since your last visit? no    2. Have you seen or consulted any other health care providers outside of the [address] [place] [address] since your last visit? Include any pap smears or colon screening.  No

## 2020-02-23 PROBLEM — I12.0 HYPERTENSIVE KIDNEY DISEASE WITH END-STAGE RENAL DISEASE (HCC): Status: RESOLVED | Noted: 2019-12-27 | Resolved: 2020-02-23

## 2020-02-23 PROBLEM — N18.6 HYPERTENSIVE KIDNEY DISEASE WITH END-STAGE RENAL DISEASE (HCC): Status: RESOLVED | Noted: 2019-12-27 | Resolved: 2020-02-23

## 2020-02-23 RX ORDER — ESOMEPRAZOLE MAGNESIUM 40 MG/1
40 CAPSULE, DELAYED RELEASE ORAL DAILY
Qty: 90 CAP | Refills: 3 | Status: SHIPPED | OUTPATIENT
Start: 2020-02-23 | End: 2021-05-03

## 2020-02-23 NOTE — ACP (ADVANCE CARE PLANNING)
Advance Care Planning    Advance Care Planning (ACP) Provider Note - Comprehensive     Date of ACP Conversation: 02/23/20  Persons included in Conversation:  patient  Length of ACP Conversation in minutes:  16 minutes    Authorized Decision Maker (if patient is incapable of making informed decisions):    This person is: wife  Other Legally Authorized Decision Maker (e.g. Next of Kin)          General ACP for ALL Patients with Decision Making Capacity:   Importance of advance care planning, including choosing a healthcare agent to communicate patient's healthcare decisions if patient lost the ability to make decisions, such as after a sudden illness or accident  Understanding of the healthcare agent role was assessed and information provided    Review of Existing Advance Directive:  na    For Serious or Chronic Illness:  Understanding of medical condition      Interventions Provided:  Recommended completion of Advance Directive form after review of ACP materials and conversation with prospective healthcare agent   Recommended communicating the plan and making copies for the healthcare agent, personal physician, and others as appropriate (e.g., health system)  Recommended review of completed ACP document annually or upon change in health status

## 2020-02-23 NOTE — PATIENT INSTRUCTIONS
Medicare Wellness Visit, Male The best way to live healthy is to have a lifestyle where you eat a well-balanced diet, exercise regularly, limit alcohol use, and quit all forms of tobacco/nicotine, if applicable. Regular preventive services are another way to keep healthy. Preventive services (vaccines, screening tests, monitoring & exams) can help personalize your care plan, which helps you manage your own care. Screening tests can find health problems at the earliest stages, when they are easiest to treat. [facility] follows the current, evidence-based guidelines published by the [facility] Kyle Andrea ([facility]) when recommending preventive services for our patients. Because we follow these guidelines, sometimes recommendations change over time as research supports it. (For example, a prostate screening blood test is no longer routinely recommended for men with no symptoms). Of course, you and your doctor may decide to screen more often for some diseases, based on your risk and co-morbidities (chronic disease you are already diagnosed with). Preventive services for you include: - Medicare offers their members a free annual wellness visit, which is time for you and your primary care provider to discuss and plan for your preventive service needs. Take advantage of this benefit every year! 
-All adults over age 72 should receive the recommended pneumonia vaccines. Current USPSTF guidelines recommend a series of two vaccines for the best pneumonia protection.  
-All adults should have a flu vaccine yearly and tetanus vaccine every 10 years. 
-All adults age 48 and older should receive the shingles vaccines (series of two vaccines).       
-All adults age 38-68 who are overweight should have a diabetes screening test once every three years.  
-Other screening tests & preventive services for persons with diabetes include: an eye exam to screen for diabetic retinopathy, a kidney function test, a foot exam, and stricter control over your cholesterol.  
-Cardiovascular screening for adults with routine risk involves an electrocardiogram (ECG) at intervals determined by the provider.  
-Colorectal cancer screening should be done for adults age 54-65 with no increased risk factors for colorectal cancer. There are a number of acceptable methods of screening for this type of cancer. Each test has its own benefits and drawbacks. Discuss with your provider what is most appropriate for you during your annual wellness visit. The different tests include: colonoscopy (considered the best screening method), a fecal occult blood test, a fecal DNA test, and sigmoidoscopy. 
-All adults born between [facility] should be screened once for Hepatitis C. 
-An Abdominal Aortic Aneurysm (AAA) Screening is recommended for men age 73-68 who has ever smoked in their lifetime. Here is a list of your current Health Maintenance items (your personalized list of preventive services) with a due date: 
Health Maintenance Due Topic Date Due  Shingles Vaccine (1 of 2) 11/19/2003 Adrienne Duncan Annual Well Visit  02/16/2020

## 2020-03-13 DIAGNOSIS — E78.5 DYSLIPIDEMIA: ICD-10-CM

## 2020-03-13 RX ORDER — ATORVASTATIN CALCIUM 40 MG/1
40 TABLET, FILM COATED ORAL DAILY
Qty: 90 TAB | Refills: 3 | Status: SHIPPED | OUTPATIENT
Start: 2020-03-13 | End: 2021-09-29 | Stop reason: DRUGHIGH

## 2020-03-13 NOTE — TELEPHONE ENCOUNTER
Last Visit: 2/21/20 with MD Sadia Sheridan  Next Appointment: 2/26/21 with MD Sadia Sheridan  Previous Refill Encounter(s): 2/17/19 #90 with 3 refills    Requested Prescriptions     Pending Prescriptions Disp Refills    atorvastatin (LIPITOR) 40 mg tablet 90 Tab 3     Sig: Take 1 Tab by mouth daily.

## 2020-06-08 LAB
HBA1C MFR BLD HPLC: 4.8 %
LDL-C, EXTERNAL: 62

## 2020-11-06 LAB — HBA1C MFR BLD HPLC: 8.4 %

## 2020-11-25 ENCOUNTER — TELEPHONE (OUTPATIENT)
Dept: INTERNAL MEDICINE CLINIC | Age: 65
End: 2020-11-25

## 2020-11-25 NOTE — TELEPHONE ENCOUNTER
Roz is calling from [facility] Dr. Gutierrez's office requesting most recent chart note.     Fax: [phone]

## 2020-11-30 ENCOUNTER — TELEPHONE (OUTPATIENT)
Dept: INTERNAL MEDICINE CLINIC | Age: 65
End: 2020-11-30

## 2020-11-30 NOTE — TELEPHONE ENCOUNTER
Roz is calling from [facility]. Patient is having emergency surgery with Dr. Marian Tiwari tomorrow and she needs us to fax his most recent chart note.     Fax: [phone]

## 2020-11-30 NOTE — TELEPHONE ENCOUNTER
Called Roz and left voicemail. She called here on the 25th and most recent note we have was faxed. Patient not seen since Feb 2020.

## 2021-02-19 ENCOUNTER — TELEPHONE (OUTPATIENT)
Dept: INTERNAL MEDICINE CLINIC | Age: 66
End: 2021-02-19

## 2021-04-30 DIAGNOSIS — K21.9 GERD WITHOUT ESOPHAGITIS: ICD-10-CM

## 2021-05-03 RX ORDER — ESOMEPRAZOLE MAGNESIUM 40 MG/1
CAPSULE, DELAYED RELEASE ORAL
Qty: 90 CAP | Refills: 3 | Status: SHIPPED | OUTPATIENT
Start: 2021-05-03 | End: 2022-02-21

## 2021-08-04 LAB — HBA1C MFR BLD HPLC: 7.7 %

## 2021-08-06 LAB
HBA1C MFR BLD HPLC: 7.6 %
LDL-C, EXTERNAL: 99

## 2021-09-22 NOTE — PROGRESS NOTES
77 y.o. BLACK/ male who presents for f/u evaluation. Doing well since transplant. Being followed by Dr James Gamble currently    No cardiovascular complaints. He is able to walks 3/4 to 1 mile and no limiting sx. He is interested in seeing someone for right foot drop. He noted onset when he was in the hospital for the transplant and they gave him rehab for it but did not give him a specific dx, did not see neuro, does not recall emg being done or any spine imaging. Denies any GI or  complaints     Denies polyuria, polydipsia, nocturia, vision change.  Weight is stable  He is being managed by Dr Waqas Burroughsw: 9/18/14, 9/24/15, 12/20/16, 3/23/18, 2/15/19, 2/21/20, 9/29/21    Past Medical History:   Diagnosis Date    Acute hearing loss of right ear     Acute urinary retention 8/15    Anemia of chronic disease 2010    saw Dr. Nabeel Mejia in past    Asbestosis(501)     CHF (congestive heart failure) ([facility] 2011    Chronic edema     Colon adenoma     Dr. Nahomy Nunn 2009, 2/15, 4/18    Diabetes mellitus ([facility]     w neuropathy and retinopathy Dr Milagros Mendez; now seing Dr Jeff Reyes Dyslipidemia     Erectile dysfunction     ESRD on hemodialysis [facility])     Dr. Addi Ramos, M-W-F; s/p cadaveric renal transplant 2019 [facility]    FHx: heart disease     GERD (gastroesophageal reflux disease)     Glaucoma     Dr. Yumi Bardales Hyperlipidemia     Hypertension     Nephrolithiasis     ALFREDO (obstructive sleep apnea)     NOT using cpap (states resolved)    Osteoarthritis     Polycystic kidney 10/21/2019    Right foot drop 2019    S/P kidney transplant 08/2019    [facility] [address]    Sarcoidosis     Thyroid disease     Vitamin D deficiency      Past Surgical History:   Procedure Laterality Date    COLONOSCOPY N/A 4/10/2018    Dr. Nahomy Nunn adenoma 2009, 2/15; adenoma and hyperplastic 4/10/18    HX CATARACT REMOVAL      HX ORTHOPAEDIC  01/2018    left wrist and humerus fx Dr Yen Flores UROLOGICAL  9/15    cystoscopy negative Dr Fina Ny  2019    renal transplant from  donor Dr. Isma Pichardo  08/10/2019    exploratinon of kidney transplant w/ evac of subcutaneous hematoma, core biopsy of transplanted kidney, irrigation and evac of bladder clots Dr. Aliyah Ellington  2017    cath Dr Nettie Harvey; diffuse luminal irregularities without stenosis Cv, LAD, 20-30% mid RCA     [facility] CARDIAC SUR[facility] PROCEDURE UNLIST      neg thanllium     [facility] INTRO CATH DIALYSIS CIRCUIT DX ANGRPH FLUOR S&I N/A 2018    Fistulogram Left performed by Christian Deras [facility] at [facility] CATH LAB   [address]  3/14    nl BALDEMAR bilat    VASCULAR SURGERY PROCEDURE UNLIST      LEFT ARM GRAFT FOR HD     Social History     Socioeconomic History    Marital status:      Spouse name: Not on file    Number of children: 3    Years of education: Not on file    Highest education level: Not on file   Occupational History    Occupation: [employer]   Tobacco Use    Smoking status: Never Smoker    Smokeless tobacco: Never Used   Substance and Sexual Activity    Alcohol use: No    Drug use: No    Sexual activity: Never   Other Topics Concern    Not on file   Social History Narrative    Not on file     Social Determinants of Health     Financial Resource Strain:     Difficulty of Paying Living Expenses:    Food Insecurity:     Worried About Running Out of Food in the Last Year:     [address] in the Last Year:    Transportation Needs:     Lack of Transportation (Medical):      Lack of Transportation (Non-Medical):    Physical Activity:     Days of Exercise per Week:     Minutes of Exercise per Session:    Stress:     Feeling of Stress :    Social Connections:     Frequency of Communication with Friends and Family:     Frequency of Social Gatherings with Friends and Family:     Attends Spiritism Services:     Active Member of Clubs or Organizations:     Attends Club or Organization Meetings:     Marital Status:    Intimate Partner Violence:     Fear of Current or Ex-Partner:     Emotionally Abused:     Physically Abused:     Sexually Abused:      No Known Allergies    Current Outpatient Medications   Medication Sig    atorvastatin (LIPITOR) 80 mg tablet Take 1 Tablet by mouth daily.  esomeprazole (NEXIUM) 40 mg capsule TAKE 1 CAPSULE BY MOUTH DAILY    tacrolimus (PROGRAF) 1 mg capsule Take  by mouth every twelve (12) hours.  dapsone 100 mg tablet Take 100 mg by mouth daily.  phosphorus (PHOSPHA 250 NEUTRAL) 250 mg tablet Take 1 Tab by mouth three (3) times daily.  brimonidine-timolol (COMBIGAN) 0.2-0.5 % drop ophthalmic solution Administer 1 Drop to both eyes every twelve (12) hours.  magnesium oxide (MAG-OX) 400 mg tablet Take 400 mg by mouth daily.  insulin NPH human isophane (HUMULIN N NPH INSULIN KWIKPEN SC) 30 Units by SubCUTAneous route daily.  glucose blood VI test strips (ASCENSIA AUTODISC VI, ONE TOUCH ULTRA TEST VI) strip 100 Each.  mycophenolic acid (MYFORTIC) 006 mg delayed release tablet Take 180 mg by mouth two (2) times a day.  predniSONE (DELTASONE) 5 mg tablet Take 10 mg by mouth daily.  NIFEdipine ER (PROCARDIA XL) 90 mg ER tablet Take 1 Tab by mouth daily.  cinacalcet (SENSIPAR) 60 mg tab Take 60 mg by mouth nightly.  DUREZOL 0.05 % ophthalmic emulsion Administer  to both eyes daily. (Patient not taking: Reported on 9/29/2021)     No current facility-administered medications for this visit.      REVIEW OF SYSTEMS: colo Dr Nikita Jones 4/18, sees Dr Melanie Forrest, Dr Petra Amaya in past  Ophtho - no vision change or eye pain  Oral - no mouth pain, tongue or tooth problems  Ears - no hearing loss, ear pain, fullness, no swallowing problems  Cardiac - no CP, PND, orthopnea, edema, palpitations or syncope  Chest - no breast masses  Resp - no wheezing, chronic coughing, dyspnea  GI - no heartburn, nausea, vomiting, change in bowel habits, bleeding, hemorrhoids  Urinary - no dysuria, hematuria, flank pain, urgency, frequency    Visit Vitals  /70   Pulse 77   Temp 97.7 °F (36.5 °C) (Temporal)   Resp 16   Ht 5' 11\" (1.803 m)   Wt 185 lb (83.9 kg)   SpO2 98%   BMI 25.80 kg/m²   A&O x3  Affect is appropriate. Mood stable  No apparent distress  Anicteric, no JVD, adenopathy or thyromegaly. No carotid bruits or radiated murmur  Lungs clear to auscultation, no wheezes or rales   Heart --Regular rate and rhythm, 2/6 ERNESTINA lsb without radiation, no rubs, gallops, or clicks. Abdomen -- Soft and nontender, no hepatosplenomegaly or masses. Extremities -- Without cyanosis, clubbing. 2+ pulses equally and bilaterally.   No edema noted, LUE AV fistula w thrill and bruit  Pulses 1-2+ DP and PT    LABS  From 2/11 showed   gluc 131, cr 4.5,             hba1c 6.1,                   chol 187, tg 154, hdl 25, ldl-c 131, wbc 9.8,  hb 8.7,   plt 374, psa 1.00  From 11/11 showed gluc 114,              hba1c 5.6,             chol 157, tg 157, hdl 36, ldl-c 90  From 5/12 showed     alt 13, hba1c 6.1, ldl-p 1661, chol 184, tg 192, hdl 27, ldl-c 119  From 11/12 showed gluc 130, cr 6.0, gfr 11, alt 9,   hba1c 5.4, ldl-p 884,            wbc 8.0,  hb 10.3  plt 324  From 8/13 showed   gluc 144, cr 8.3,  alt<5,  hba1c 5.7,             chol 169, tg 327, hdl 20, ldl-c 84   From 3/14 showed   gluc 125, cr 6.9,  alt<5,  hba1c 5.8,             chol 141, tg 233, hdl 24, ldl-c 70,  wbc 5.7,   hb 10.4, plt 239  From 9/14 showed                hba1c 5.7,                        psa 1.60, vit d 32.8  From 8/15 showed   gluc 151,   alt 14,               wbc 11.9, hb 11.5, plt 189, lip 283  From 9/15 showed   gluc 110, cr 6.51, gfr 9, alt<5,  hba1c 5.6,             chol 182, tg 219, hdl 26, ldl-c 112, wbc 4.5,   hb 11.7, plt 245  From 2/16 showed                hba1c 5.6,             chol 183, tg 169, hdl 34, ldl-c 115  Form 6/16 showed   gluc 124, cr 8.22,             hba1c 6.0,             chol 140, tg 184, hdl 30, ldl-c 73  From 12/16 showed               alt 15, hba1c 4.8,             chol 138, tg 138, hdl 33, ldl-c 77  From 3/18 showed   gluc 103,   alt 13, hba1c 4.6,                   chol 150, tg 108, hdl 35, ldl-c 93  From 2/19 showed   gluc 91,    alt 18, hba1c 5.5,             chol 216, tg 169, hdl 33, ldl-c 149, wbc 4.9, hb 12.9, plt 265  From 6/20 showed               hba1c 4.8,         ldl-c 62  From 11/20 showed               hba1c 8.4  From 3/21 showed                hba1c 8.1,             chol 167, tg 231, hdl 29, ldl-c 92  From 8/21 showed   gluc 109, cr 1.40,           alt 65, hba1c 7.7,             chol 161, tg 260, hdl 29, ldl-c 81, wbc 11.4, hb 17.2, plt 253    We reviewed the patient's labs from the last several visits to point out trends in the numbers          Patient Active Problem List   Diagnosis Code    Sleep apnea Dr. Edgar Wells G47.30    Colon adenoma 2/15 Dr. Serjio Donald D12.6    Erectile dysfunction N52.9    Sarcoidosis D86.9    Dyslipidemia E78.5    Primary hypertension I10    GERD without esophagitis K21.9    Arthritis, degenerative M19.90    Advance directive discussed with patient Z71.89    Diabetic retinopathy ([facility] E11.319    Type 2 diabetes, controlled, with neuropathy (HCC) Dr Amada Horton E11.40    Type 2 diabetes with nephropathy ([facility] E11.21    S/P kidney transplant Z94.0     ASSESSMENT AND PLAN:  1. DM.  Well-controlled and follow up Dr Janessa Travis, f/u Dr. Sridevi Turcios and Dr Param Coates  2. Hypertension. Continue current   3. Hyperlipidemia. Increase lipitor to 80  4. S/P renal transplant. F/u transplant clinic  5. Colon polyps. Fiber, colo 2023  6. Ortho. F/U Dr Jessica Dawn  7. Sleep apnea. Off cpap. F/U Dr. Edgar Wells as needed  8. Ophtho. F/U Dr. Alexandria Carrasquillo  9. GERD. Daily ppi, avoidance measures  10. Foot drop.   Will send to neuro for opinion               RTC 8/20    Above conditions discussed at length and patient vocalized understanding. All questions answered to patient satisfaction        ICD-10-CM ICD-9-CM    1. Medicare annual wellness visit, subsequent  Z00.00 V70.0    2. Primary hypertension  I10 401.9    3. Colon adenoma 2/15 Dr. Christina Blandon  D12.6 211.3    4. Diabetic retinopathy associated with type 2 diabetes mellitus, macular edema presence unspecified, unspecified laterality, unspecified retinopathy severity [facility]  E11.319 250.50      362.01    5. Type 2 diabetes, controlled, with neuropathy ([facility] Dr Bennett Sites  E11.40 250.60 CBC W/O DIFF     666.2 METABOLIC PANEL, COMPREHENSIVE      LIPID PANEL      HEMOGLOBIN A1C WITH EAG   6. Obstructive sleep apnea syndrome  G47.33 327.23    7. S/P kidney transplant  Z94.0 V42.0    8. Dyslipidemia  E78.5 272.4 atorvastatin (LIPITOR) 80 mg tablet   9.  Advanced directives, counseling/discussion  Z71.89 V65.49 ADVANCE CARE PLANNING FIRST 30 MINS   10. Right foot drop  M21.371 736.79 REFERRAL TO NEUROLOGY

## 2021-09-22 NOTE — PROGRESS NOTES
This is a Subsequent Medicare Annual Wellness Visit     I have reviewed the patient's medical history in detail and updated the computerized patient record. Assessment/Plan   Education and counseling provided:  Are appropriate based on today's review and evaluation  End-of-Life planning (with patient's consent)  Influenza Vaccine  Prostate cancer screening tests (PSA, covered annually)  Colorectal cancer screening tests    1. Medicare annual wellness visit, subsequent  2. Primary hypertension  3. Colon adenoma 2/15 Dr. Bg Cespedes  4. Diabetic retinopathy associated with type 2 diabetes mellitus, macular edema presence unspecified, unspecified laterality, unspecified retinopathy severity ([facility]  5. Type 2 diabetes, controlled, with neuropathy ([facility] Dr Jaky Rogers  -     [facility] W/O DIFF; Future  -     METABOLIC PANEL, COMPREHENSIVE; Future  -     LIPID PANEL; Future  -     HEMOGLOBIN A1C WITH EAG; Future  6. Obstructive sleep apnea syndrome  7. S/P kidney transplant  8. Dyslipidemia  -     atorvastatin (LIPITOR) 80 mg tablet; Take 1 Tablet by mouth daily. , Normal, Disp-90 Tablet, R-3  9. Advanced directives, counseling/discussion  -     ADVANCE CARE PLANNING FIRST 30 MINS  10. Right foot drop  -     REFERRAL TO NEUROLOGY       Depression Risk Factor Screening     3 most recent PHQ Screens 9/29/2021   Little interest or pleasure in doing things Not at all   Feeling down, depressed, irritable, or hopeless Not at all   Total Score PHQ 2 0       Alcohol Risk Screen    Do you average more than 1 drink per night or more than 7 drinks a week: No    In the past three months have you have had more than 4 drinks containing alcohol on one occasion: No        Functional Ability and Level of Safety    Hearing: Hearing is good. Activities of Daily Living: The home contains: no safety equipment.   Patient does total self care      Ambulation: with difficulty, uses a cane     Fall Risk:  Fall Risk Assessment, last 12 mths 9/29/2021 Able to walk? Yes   Fall in past 12 months? 0   Do you feel unsteady?  0   Are you worried about falling 0      Abuse Screen:  Patient is not abused       Cognitive Screening    Has your family/caregiver stated any concerns about your memory: no     Cognitive Screening: Normal - Mini Cog Test    Health Maintenance Due     Health Maintenance Due   Topic Date Due    Shingrix Vaccine Age 49> (1 of 2) Never done    COVID-19 Vaccine (3 - Moderna risk 3-dose series) 04/05/2021    Flu Vaccine (1) 09/01/2021       Patient Care Team   Patient Care Team:  Moreen Bamberger, MD as PCP - General (Internal Medicine)  Moreen Bamberger, MD as PCP - [facility] Empaneled Provider  Jose Caruso DPM ([place])  Leo Short MD (Ophthalmology)    History     Patient Active Problem List   Diagnosis Code    Sleep apnea Dr. Cecille Robins G47.30    Colon adenoma 2/15 Dr. Radha Emerson D12.6    Erectile dysfunction N52.9    Sarcoidosis D86.9    Dyslipidemia E78.5    Primary hypertension I10    GERD without esophagitis K21.9    Arthritis, degenerative M19.90    Advance directive discussed with patient Z71.89    Diabetic retinopathy ([address] E11.319    Type 2 diabetes, controlled, with neuropathy ([address] Dr Maggie Lopez E11.40    Type 2 diabetes with nephropathy ([address] E11.21    S/P kidney transplant Z94.0     Past Medical History:   Diagnosis Date    Acute hearing loss of right ear     Acute urinary retention 8/15    Anemia of chronic disease 2010    saw Dr. Georgi Lr in past    Asbestosis(501)     Chronic edema     Colon adenoma     Dr. Radha Emerson 2009, 2/15, 4/18    Congestive heart failure ([address] 2011    Diabetes mellitus ([address]     w neuropathy and retinopathy Dr. Amy OLEA (degenerative joint disease)     Dyslipidemia     Erectile dysfunction     ESRD on hemodialysis ([address]     Dr. Mark Koch, M-W-F; s/p cadaveric renal transplant 2019 [facility]    FHx: heart disease     GERD (gastroesophageal reflux disease)     Glaucoma Dr. Ana Ford (hyperlipidemia)     HTN (hypertension)     Kidney stone     Polycystic kidney 10/21/2019    [facility] kidney transplant 2019    [facility]    Sarcoidosis     Sleep apnea     NOT using cpap (states resolved)    Thyroid disease     Vitamin D deficiency       Past Surgical History:   Procedure Laterality Date    COLONOSCOPY N/A 4/10/2018    Dr. Radha Emerson adenoma , 2/15; adenoma and hyperplastic 4/10/18    HX CATARACT REMOVAL      HX ORTHOPAEDIC  2018    left wrist and humerus fx Dr Gary Padilla  9/15    cystoscopy negative Dr Elodia Guerrero  2019    renal transplant from  donor Dr. Bernard Wolff  08/10/2019    exploratinon of kidney transplant w/ evac of subcutaneous hematoma, core biopsy of transplanted kidney, irrigation and evac of bladder clots Dr. Srinivasan Han  2017    cath Dr Annette Mayen; diffuse luminal irregularities without stenosis Cv, LAD, 20-30% mid RCA     [place] CARDIAC SURG PROCEDURE UNLIST      neg thanllium     [place] INTRO CATH DIALYSIS CIRCUIT DX ANGRPH FLUOR S&I N/A 2018    Fistulogram [facility] CATH LAB    VASCULAR SURGERY PROCEDURE UNLIST  3/14    nl BALDEMAR bilat    VASCULAR SURGERY PROCEDURE UNLIST      LEFT ARM GRAFT FOR HD     Current Outpatient Medications   Medication Sig Dispense Refill    atorvastatin (LIPITOR) 80 mg tablet Take 1 Tablet by mouth daily. 90 Tablet 3    esomeprazole (NEXIUM) 40 mg capsule TAKE 1 CAPSULE BY MOUTH DAILY 90 Cap 3    tacrolimus (PROGRAF) 1 mg capsule Take  by mouth every twelve (12) hours.  dapsone 100 mg tablet Take 100 mg by mouth daily.  phosphorus (PHOSPHA 250 NEUTRAL) 250 mg tablet Take 1 Tab by mouth three (3) times daily.  brimonidine-timolol (COMBIGAN) 0.2-0.5 % drop ophthalmic solution Administer 1 Drop to both eyes every twelve (12) hours.       magnesium oxide (MAG-OX) 400 mg tablet Take 400 mg by mouth daily.  insulin NPH human isophane (HUMULIN N NPH INSULIN KWIKPEN SC) 30 Units by SubCUTAneous route daily.  glucose blood VI test strips (ASCENSIA AUTODISC VI, ONE TOUCH ULTRA TEST VI) strip 100 Each.  mycophenolic acid (MYFORTIC) 189 mg delayed release tablet Take 180 mg by mouth two (2) times a day.  predniSONE (DELTASONE) 5 mg tablet Take 10 mg by mouth daily.  NIFEdipine ER (PROCARDIA XL) 90 mg ER tablet Take 1 Tab by mouth daily. 90 Tab 3    cinacalcet (SENSIPAR) 60 mg tab Take 60 mg by mouth nightly.  DUREZOL 0.05 % ophthalmic emulsion Administer  to both eyes daily.  (Patient not taking: Reported on 9/29/2021)  3     No Known Allergies    Family History   Problem Relation Age of Onset    Diabetes Father     Heart Disease Sister     Diabetes Brother     Heart Disease Brother     Diabetes Brother     Diabetes Sister      Social History     Tobacco Use    Smoking status: Never Smoker    Smokeless tobacco: Never Used   Substance Use Topics    Alcohol use: No         Urbano Tariq MD

## 2021-09-29 ENCOUNTER — OFFICE VISIT (OUTPATIENT)
Dept: INTERNAL MEDICINE CLINIC | Age: 66
End: 2021-09-29
Payer: MEDICARE

## 2021-09-29 VITALS
BODY MASS INDEX: 25.9 KG/M2 | TEMPERATURE: 97.7 F | SYSTOLIC BLOOD PRESSURE: 139 MMHG | HEART RATE: 77 BPM | DIASTOLIC BLOOD PRESSURE: 70 MMHG | OXYGEN SATURATION: 98 % | WEIGHT: 185 LBS | HEIGHT: 71 IN | RESPIRATION RATE: 16 BRPM

## 2021-09-29 DIAGNOSIS — E78.5 DYSLIPIDEMIA: ICD-10-CM

## 2021-09-29 DIAGNOSIS — Z71.89 ADVANCED DIRECTIVES, COUNSELING/DISCUSSION: ICD-10-CM

## 2021-09-29 DIAGNOSIS — Z00.00 MEDICARE ANNUAL WELLNESS VISIT, SUBSEQUENT: Primary | ICD-10-CM

## 2021-09-29 DIAGNOSIS — I10 PRIMARY HYPERTENSION: ICD-10-CM

## 2021-09-29 DIAGNOSIS — E11.319 DIABETIC RETINOPATHY ASSOCIATED WITH TYPE 2 DIABETES MELLITUS, MACULAR EDEMA PRESENCE UNSPECIFIED, UNSPECIFIED LATERALITY, UNSPECIFIED RETINOPATHY SEVERITY (HCC): ICD-10-CM

## 2021-09-29 DIAGNOSIS — Z94.0 S/P KIDNEY TRANSPLANT: ICD-10-CM

## 2021-09-29 DIAGNOSIS — G47.33 OBSTRUCTIVE SLEEP APNEA SYNDROME: ICD-10-CM

## 2021-09-29 DIAGNOSIS — D12.6 COLON ADENOMA: ICD-10-CM

## 2021-09-29 DIAGNOSIS — M21.371 RIGHT FOOT DROP: ICD-10-CM

## 2021-09-29 DIAGNOSIS — E11.40 TYPE 2 DIABETES, CONTROLLED, WITH NEUROPATHY (HCC): ICD-10-CM

## 2021-09-29 PROCEDURE — 3046F HEMOGLOBIN A1C LEVEL >9.0%: CPT | Performed by: INTERNAL MEDICINE

## 2021-09-29 PROCEDURE — G8536 NO DOC ELDER MAL SCRN: HCPCS | Performed by: INTERNAL MEDICINE

## 2021-09-29 PROCEDURE — 2022F DILAT RTA XM EVC RTNOPTHY: CPT | Performed by: INTERNAL MEDICINE

## 2021-09-29 PROCEDURE — G0463 HOSPITAL OUTPT CLINIC VISIT: HCPCS | Performed by: INTERNAL MEDICINE

## 2021-09-29 PROCEDURE — G8419 CALC BMI OUT NRM PARAM NOF/U: HCPCS | Performed by: INTERNAL MEDICINE

## 2021-09-29 PROCEDURE — 3017F COLORECTAL CA SCREEN DOC REV: CPT | Performed by: INTERNAL MEDICINE

## 2021-09-29 PROCEDURE — G8427 DOCREV CUR MEDS BY ELIG CLIN: HCPCS | Performed by: INTERNAL MEDICINE

## 2021-09-29 PROCEDURE — G9231 DOC ESRD DIA TRANS PREG: HCPCS | Performed by: INTERNAL MEDICINE

## 2021-09-29 PROCEDURE — 99497 ADVNCD CARE PLAN 30 MIN: CPT | Performed by: INTERNAL MEDICINE

## 2021-09-29 PROCEDURE — 99214 OFFICE O/P EST MOD 30 MIN: CPT | Performed by: INTERNAL MEDICINE

## 2021-09-29 PROCEDURE — G8510 SCR DEP NEG, NO PLAN REQD: HCPCS | Performed by: INTERNAL MEDICINE

## 2021-09-29 PROCEDURE — 1101F PT FALLS ASSESS-DOCD LE1/YR: CPT | Performed by: INTERNAL MEDICINE

## 2021-09-29 PROCEDURE — G0439 PPPS, SUBSEQ VISIT: HCPCS | Performed by: INTERNAL MEDICINE

## 2021-09-29 RX ORDER — ATORVASTATIN CALCIUM 80 MG/1
80 TABLET, FILM COATED ORAL DAILY
Qty: 90 TABLET | Refills: 3 | Status: SHIPPED | OUTPATIENT
Start: 2021-09-29 | End: 2022-10-10

## 2021-09-29 NOTE — PROGRESS NOTES
Beltrami Edwin presents today for   Chief Complaint   Patient presents with   [facility] Annual Wellness Visit    Cholesterol Problem    Hypertension    Diabetes        Coordination of Care:  1. Have you been to the ER, urgent care clinic since your last visit? Hospitalized since your last visit? YES    2. Have you seen or consulted any other health care providers outside of the [address] [place] [address] since your last visit? Include any pap smears or colon screening.  YES

## 2021-09-30 NOTE — ACP (ADVANCE CARE PLANNING)
Advance Care Planning     Advance Care Planning (ACP) Physician/NP/PA Conversation      Date of Conversation: 9/29/2021  Conducted with: Patient with Decision Making Capacity    Healthcare Decision Maker:   No healthcare decision makers have been documented. Click here to complete [address] including selection of the Healthcare Decision Maker Relationship (ie \"Primary\")      Today we documented Decision Maker(s) consistent with Legal Next of Kin hierarchy. Care Preferences:    Hospitalization: \"If your health worsens and it becomes clear that your chance of recovery is unlikely, what would be your preference regarding hospitalization? \"  The patient would prefer hospitalization. Ventilation: \"If you were unable to breathe on your own and your chance of recovery was unlikely, what would be your preference about the use of a ventilator (breathing machine) if it was available to you? \"   The patient would desire the use of a ventilator. Resuscitation: \"In the event your heart stopped as a result of an underlying serious health condition, would you want attempts to be made to restart your heart, or would you prefer a natural death? \"   Yes, attempt to resuscitate.     Additional topics discussed: ventilation preferences, hospitalization preferences and resuscitation preferences    Conversation Outcomes / Follow-Up Plan:   ACP in process - information provided, considering goals and options  Reviewed DNR/DNI and patient elects Full Code (Attempt Resuscitation)     Length of Voluntary ACP Conversation in minutes:  16 minutes    Kendra Griffith MD

## 2021-09-30 NOTE — PATIENT INSTRUCTIONS
Medicare Wellness Visit, Male    The best way to live healthy is to have a lifestyle where you eat a well-balanced diet, exercise regularly, limit alcohol use, and quit all forms of tobacco/nicotine, if applicable. Regular preventive services are another way to keep healthy. Preventive services (vaccines, screening tests, monitoring & exams) can help personalize your care plan, which helps you manage your own care. Screening tests can find health problems at the earliest stages, when they are easiest to treat. [facility] follows the current, evidence-based guidelines published by the [facility] Andrea ([facility]) when recommending preventive services for our patients. Because we follow these guidelines, sometimes recommendations change over time as research supports it. (For example, a prostate screening blood test is no longer routinely recommended for men with no symptoms). Of course, you and your doctor may decide to screen more often for some diseases, based on your risk and co-morbidities (chronic disease you are already diagnosed with). Preventive services for you include:  - Medicare offers their members a free annual wellness visit, which is time for you and your primary care provider to discuss and plan for your preventive service needs. Take advantage of this benefit every year!  -All adults over age 72 should receive the recommended pneumonia vaccines. Current USPSTF guidelines recommend a series of two vaccines for the best pneumonia protection.   -All adults should have a flu vaccine yearly and tetanus vaccine every 10 years.  -All adults age 48 and older should receive the shingles vaccines (series of two vaccines).        -All adults age 38-68 who are overweight should have a diabetes screening test once every three years.   -Other screening tests & preventive services for persons with diabetes include: an eye exam to screen for diabetic retinopathy, a kidney function test, a foot exam, and stricter control over your cholesterol.   -Cardiovascular screening for adults with routine risk involves an electrocardiogram (ECG) at intervals determined by the provider.   -Colorectal cancer screening should be done for adults age 54-65 with no increased risk factors for colorectal cancer. There are a number of acceptable methods of screening for this type of cancer. Each test has its own benefits and drawbacks. Discuss with your provider what is most appropriate for you during your annual wellness visit. The different tests include: colonoscopy (considered the best screening method), a fecal occult blood test, a fecal DNA test, and sigmoidoscopy.  -All adults born between [facility] should be screened once for Hepatitis C.  -An Abdominal Aortic Aneurysm (AAA) Screening is recommended for men age 73-68 who has ever smoked in their lifetime.      Here is a list of your current Health Maintenance items (your personalized list of preventive services) with a due date:  Health Maintenance Due   Topic Date Due    Shingles Vaccine (1 of 2) Never done    COVID-19 Vaccine (3 - Moderna risk 3-dose series) 04/05/2021    Yearly Flu Vaccine (1) 09/01/2021

## 2022-02-21 DIAGNOSIS — K21.9 GERD WITHOUT ESOPHAGITIS: ICD-10-CM

## 2022-02-21 RX ORDER — ESOMEPRAZOLE MAGNESIUM 40 MG/1
CAPSULE, DELAYED RELEASE ORAL
Qty: 90 CAPSULE | Refills: 3 | Status: SHIPPED | OUTPATIENT
Start: 2022-02-21 | End: 2022-03-18 | Stop reason: SDUPTHER

## 2022-03-18 DIAGNOSIS — K21.9 GERD WITHOUT ESOPHAGITIS: ICD-10-CM

## 2022-03-18 NOTE — TELEPHONE ENCOUNTER
----- Message from Manju Henry sent at 3/18/2022 11:19 AM EDT -----  Subject: Refill Request    QUESTIONS  Name of Medication? esomeprazole (NEXIUM) 40 mg capsule  Patient-reported dosage and instructions? TAKE 1 CAPSULE BY MOUTH DAILY  How many days do you have left? 0  Preferred Pharmacy? [facility] phone number (if available)? [phone]  Additional Information for Provider? patient is also stating that the   pharmacy is stating that he needs ro get the prescription re approved if   can please advise   ---------------------------------------------------------------------------  --------------  [address]  What is the best way for the office to contact you? OK to leave message on   [employer]  Preferred Call Back Phone Number?  [phone]

## 2022-03-18 NOTE — TELEPHONE ENCOUNTER
Previous Rx was sent to [facility]    Last Visit: 9/29/21 with MD Dinora Abreu  Next Appointment: 10/4/22 with MD Dinora Abreu    Requested Prescriptions     Pending Prescriptions Disp Refills    esomeprazole (NEXIUM) 40 mg capsule 90 Capsule 3     Sig: Take 1 Capsule by mouth daily.

## 2022-03-20 PROBLEM — E11.21 TYPE 2 DIABETES WITH NEPHROPATHY (HCC): Status: ACTIVE | Noted: 2019-02-17

## 2022-03-20 PROBLEM — Z94.0 S/P KIDNEY TRANSPLANT: Status: ACTIVE | Noted: 2019-12-12

## 2022-03-21 RX ORDER — ESOMEPRAZOLE MAGNESIUM 40 MG/1
40 CAPSULE, DELAYED RELEASE ORAL DAILY
Qty: 90 CAPSULE | Refills: 3 | Status: SHIPPED
Start: 2022-03-21 | End: 2022-03-22 | Stop reason: CLARIF

## 2022-03-22 ENCOUNTER — TELEPHONE (OUTPATIENT)
Dept: INTERNAL MEDICINE CLINIC | Age: 67
End: 2022-03-22

## 2022-03-22 RX ORDER — DEXLANSOPRAZOLE 60 MG/1
60 CAPSULE, DELAYED RELEASE ORAL DAILY
Qty: 90 CAPSULE | Refills: 3 | Status: SHIPPED
Start: 2022-03-22 | End: 2022-10-26 | Stop reason: CLARIF

## 2022-03-22 NOTE — TELEPHONE ENCOUNTER
----- Message from Chong Fisher sent at 3/22/2022  9:53 AM EDT -----  Subject: Message to Provider    QUESTIONS  Information for Provider? pt said Jules Sotelo told him there needs to be   a prior authorization done for the nexium that was sent to them 3/21/22. please work on this and call pt when done.   ---------------------------------------------------------------------------  --------------  Miladys CASTILLO  What is the best way for the office to contact you? OK to leave message on   voicemail  Preferred Call Back Phone Number? [phone]  ---------------------------------------------------------------------------  --------------  SCRIPT ANSWERS  Relationship to Patient?  Self

## 2022-03-22 NOTE — TELEPHONE ENCOUNTER
PA denied for nexium see message below from insurance. There is an EOC that was clinically denied within the last 60 days. This request needs to be sent to the [place]. at [facility]. Appeal requests must come from the member or the provider.   Drug  Esomeprazole Magnesium 40MG dr capsules        Health Plans Preferred Products  OMEPRAZOLE   DEXILANT

## 2022-04-18 ENCOUNTER — HOSPITAL ENCOUNTER (OUTPATIENT)
Dept: PHYSICAL THERAPY | Age: 67
Discharge: HOME | End: 2022-04-18
Payer: MEDICARE

## 2022-04-18 PROCEDURE — 97110 THERAPEUTIC EXERCISES: CPT

## 2022-04-18 PROCEDURE — 97530 THERAPEUTIC ACTIVITIES: CPT

## 2022-04-18 PROCEDURE — 97162 PT EVAL MOD COMPLEX 30 MIN: CPT

## 2022-04-18 NOTE — PROGRESS NOTES
[facility]  [address]  [place], [address]  [phone] fax    Plan of Care/ Statement of Necessity for Physical Therapy Services    Patient name: Elaine Pack Start of Care: 2022   Referral source: Francia Severin, NP : 1955    Medical Diagnosis: Other abnormalities of gait and mobility [R26.89]  Payor: [place] MEDICARE / Plan: [place] MEDICARE PART A & B / Product Type: Medicare /  Onset Date: chronic, worsening over the last 4 years     Treatment Diagnosis: imbalance, gait instability    Prior Hospitalization: see medical history Provider#: [id number]   Medications: Verified on Patient summary List    Comorbidities: Arthritis, Diabetes Type I or II, Hearing Impairment, High Blood Pressure, Visual  Impairment   Prior Level of Function: ambulating with SPC, independent with ADLs/IADLs       The Plan of Care and following information is based on the information from the initial evaluation. Assessment/ key information: Patient is a 74year old male presenting with bilateral (right > left) LE weakness and imbalance worsening over the last 4 years. He reports rolling to the left to get out of the bed 4 years ago and felt a pain shoot all the way down to his right foot. He was prescribed medication and the pain resolved. He reports the weakness began after that incident. He notes right foot drag with ambulation and is concerned about falling as a result. Patient demonstrates decreased endurance, LE strength grossly (right > left), impaired balance, and decreased right heel strike with increased knee/hip flexion for right foot clearance with ambulation. Patient scored 15\" on TUG with use of SPC indicating increased fall risk. He completed 8 sit<>stands in 30 seconds reporting quite a bit of fatigue/weakness in right LE. Patient education provided on use of orthotic if needed should therapy not improve his gait mechanics to decrease fall risk.  Patient and his wife understanding and agreeable to trial of therapy first. Patient would benefit from skilled PT 2x/week for 4 weeks to address the above limitations to improve ease with daily activities and decrease fall risk. Evaluation Complexity History HIGH Complexity :3+ comorbidities / personal factors will impact the outcome/ POC ; Examination MEDIUM Complexity : 3 Standardized tests and measures addressing body structure, function, activity limitation and / or participation in recreation  ;Presentation MEDIUM Complexity : Evolving with changing characteristics  ; Clinical Decision Making MEDIUM Complexity : FOTO score of 26-74  Overall Complexity Rating: MEDIUM  Problem List: pain affecting function, decrease ROM, decrease strength, impaired gait/ balance, decrease ADL/ functional abilitiies, decrease activity tolerance, decrease flexibility/ joint mobility and decrease transfer abilities   Treatment Plan may include any combination of the following: Therapeutic exercise, Therapeutic activities, Neuromuscular re-education, Physical agent/modality, Gait/balance training, Manual therapy, Patient education, Self Care training, Functional mobility training, Home safety training and Stair training  Patient / Family readiness to learn indicated by: asking questions, trying to perform skills and interest  Persons(s) to be included in education: patient (P)  Barriers to Learning/Limitations: None  Patient Goal (s): get my balance back and walk without dragging feet  Patient Self Reported Health Status: fair  Rehabilitation Potential: fair    Short Term Goals: To be accomplished in 2 weeks:  1. Patient will report performance of home exercise program 4 of 7 days in the next week demonstrating compliance to therapy program and progress towards independent management of symptoms. Long Term Goals: To be accomplished in 4 weeks:  1.  Patient will complete the TUG in <13 seconds with SPC to demonstrate decreased fall risk.  2. Patient will increase right LE strength grossly to at least 4+/5 to improve ease with ambulation and daily activities. 3. Patient will increase right DF strength to at least 3+/5 to improve gait mechanics and efficiency with ambulation and decrease fall risk. 4. Patient will increase 30 seconds sit<>stand to at least 14x to perform within age-related normative values and demonstrate improved LE strength and endurance to improve ease with transfers. 5. Patient will increase MSR bilaterally to at least 30\" to demonstrate improved stabilization and control. 6. Patient will increase FOTO score to at least 54 to demonstrate improved tolerance to household activities. Frequency / Duration: Patient to be seen 2 times per week for 4 weeks. Patient/ Caregiver education and instruction: Diagnosis, prognosis, activity modification and exercises   [x]  Plan of care has been reviewed with PTA    Certification Period: 4/18/2022 - 5/16/2022   Star Swain, PT, DPT 4/18/2022 3:01 PM    ________________________________________________________________________    I certify that the above Therapy Services are being furnished while the patient is under my care. I agree with the treatment plan and certify that this therapy is necessary.     [de-identified] Signature:____________________  Date:____________Time: _________     April Rodriguez NP  Please sign and return to In [address]  [facility] [address] [place], [address]  [phone] fax

## 2022-04-18 NOTE — PROGRESS NOTES
PT DAILY TREATMENT NOTE     Patient Name: Yane Garibay  Date:2022  : 1955  [x]  Patient  Verified  Payor: Patti Valdes / Plan: [place] MEDICARE PART A & B / Product Type: Medicare /    In time:234  Out time: 316  Total Treatment Time (min): 42  Visit #: 1 of 8    Medicare/BCBS Only   Total Timed Codes (min):  25 1:1 Treatment Time:  42       Treatment Area: Other abnormalities of gait and mobility [R26.89]    SUBJECTIVE  Pain Level (0-10 scale): 0  Any medication changes, allergies to medications, adverse drug reactions, diagnosis change, or new procedure performed?: [x] No    [] Yes (see summary sheet for update)  Subjective functional status/changes:   [] No changes reported  Patient reports imbalance and LE weakness for \"years\", however noticed increasing right foot drop over the last 4 years which poses increased fall risk. Patient has been ambulating with [facility] for the last 8 years for safety. Patient has visual deficits that also impact mobility. OBJECTIVE    17 min [x]Eval                  []Re-Eval       13 min Therapeutic Exercise:  [x] See flow sheet : Patient provided printed HEP to begin addressing impairments. Patient provided demonstration of exercises and rationale for each exercise to improve compliance to HEP. Education provided on importance of compliance to HEP for improved carry over between therapy session. Rationale: increase ROM, increase strength and improve coordination to improve the patients ability to perform ADLs and self care tasks with greater ease     12 min Therapeutic Activity:  [x]  See flow sheet : Patient education provided on pathology, findings, and treatment plan of care. Education provided on potential orthotic use if needed for right foot drop should he not make considerable progress with therapy.     Rationale: increase strength and improve coordination  to improve the patients ability to perform functional tasks with greater ease         With   [] TE   [] TA   [] neuro   [] other: Patient Education: [x] Review HEP    [] Progressed/Changed HEP based on:   [] positioning   [] body mechanics   [] transfers   [] heat/ice application    [] other:      Other Objective/Functional Measures: see paper chart for evaluation form      Pain Level (0-10 scale) post treatment: 0    ASSESSMENT/Changes in Function: Patient is a 74year old male presenting with bilateral (right > left) LE weakness and imbalance worsening over the last 4 years. He reports rolling to the left to get out of the bed 4 years ago and felt a pain shoot all the way down to his right foot. He was prescribed medication and the pain resolved. He reports the weakness began after that incident. He notes right foot drag with ambulation and is concerned about falling as a result. Patient demonstrates decreased endurance, LE strength grossly (right > left), impaired balance, and decreased right heel strike with increased knee/hip flexion for right foot clearance with ambulation. Patient scored 15\" on TUG with use of SPC indicating increased fall risk. He completed 8 sit<>stands in 30 seconds reporting quite a bit of fatigue/weakness in right LE. Patient education provided on use of orthotic if needed should therapy not improve his gait mechanics to decrease fall risk. Patient and his wife understanding and agreeable to trial of therapy first. Patient would benefit from skilled PT 2x/week for 4 weeks to address the above limitations to improve ease with daily activities and decrease fall risk.      Patient will continue to benefit from skilled PT services to modify and progress therapeutic interventions, address functional mobility deficits, address ROM deficits, address strength deficits, analyze and address soft tissue restrictions, analyze and cue movement patterns, analyze and modify body mechanics/ergonomics, assess and modify postural abnormalities, address imbalance/dizziness and instruct in home and community integration to attain remaining goals. [x]  See Plan of Care  []  See progress note/recertification  []  See Discharge Summary         Progress towards goals / Updated goals:  Short Term Goals: To be accomplished in 2 weeks:  1. Patient will report performance of home exercise program 4 of 7 days in the next week demonstrating compliance to therapy program and progress towards independent management of symptoms. Eval: HEP provided and instructed     Long Term Goals: To be accomplished in 4 weeks:  1. Patient will complete the TUG in <13 seconds with SPC to demonstrate decreased fall risk. Eval: 15 seconds with use of SPC   2. Patient will increase right LE strength grossly to at least 4+/5 to improve ease with ambulation and daily activities. Eval: 4-/5 grossly   3. Patient will increase right DF strength to at least 3+/5 to improve gait mechanics and efficiency with ambulation and decrease fall risk. Eval: 2/5 DF   4. Patient will increase 30 seconds sit<>stand to at least 14x to perform within age-related normative values and demonstrate improved LE strength and endurance to improve ease with transfers. Eval: 8x with decreased eccentric control; fatigued before 30\" was completed   5. Patient will increase MSR bilaterally to at least 30\" to demonstrate improved stabilization and control. Eval: 9\" on right, 11\" on left   6. Patient will increase FOTO score to at least 54 to demonstrate improved tolerance to household activities.     Eval: 45    PLAN  []  Upgrade activities as tolerated     [x]  Continue plan of care  []  Update interventions per flow sheet       []  Discharge due to:_  []  Other:_      Lali Lim PT, DPT 4/18/2022  2:57 PM    Future Appointments   Date Time Provider Tamir Santillan   9/27/2022  1:50 PM [facility] LAB VISIT [facility] SRINIVASAN   10/4/2022  2:00 PM Jarvis Bush MD IO [facility]

## 2022-04-21 ENCOUNTER — HOSPITAL ENCOUNTER (OUTPATIENT)
Dept: PHYSICAL THERAPY | Age: 67
Discharge: HOME | End: 2022-04-21
Payer: MEDICARE

## 2022-04-21 PROCEDURE — 97112 NEUROMUSCULAR REEDUCATION: CPT

## 2022-04-21 PROCEDURE — 97110 THERAPEUTIC EXERCISES: CPT

## 2022-04-21 NOTE — PROGRESS NOTES
PT DAILY TREATMENT NOTE     Patient Name: Jennyfer Oakes  Date:2022  : 1955  [x]  Patient  Verified  Payor: Jo Fierro / Plan: [place] MEDICARE PART A & B / Product Type: Medicare /    In time:1:45  Out time:2:29  Total Treatment Time (min): 44  Visit #: 2 of 8    Medicare/BCBS Only   Total Timed Codes (min):  44 1:1 Treatment Time:  44       Treatment Area: Other abnormalities of gait and mobility [R26.89]    SUBJECTIVE  Pain Level (0-10 scale): 0/10  Any medication changes, allergies to medications, adverse drug reactions, diagnosis change, or new procedure performed?: [x] No    [] Yes (see summary sheet for update)  Subjective functional status/changes:   [] No changes reported  \"No pain but I have a lot of weakness. \"    OBJECTIVE    21 min Therapeutic Exercise:  [x] See flow sheet :   Rationale: increase ROM and increase strength to improve the patients ability to perform ADL's.    23 min Neuromuscular Re-education:  [x]  See flow sheet : MSR on level surface. 6\" step taps, sit to stand, seated on dalton disc series, dynamic gait: lateral steps and marching. Rationale: increase strength, improve coordination, improve balance and increase proprioception  to improve the patients ability to perform functional activities. With   [x] TE   [] TA   [] neuro   [] other: Patient Education: [x] Review HEP    [] Progressed/Changed HEP based on:   [] positioning   [] body mechanics   [] transfers   [] heat/ice application    [] other:      Other Objective/Functional Measures: Initiated exercises per flow sheet. Cueing for exercise mechanics. Required occasional handrail support to perform 6\" step taps. Pain Level (0-10 scale) post treatment: 0/10    ASSESSMENT/Changes in Function: First F/U visit. Pt fully participated in treatment and is motivated. Limited Right ankle DF AROM. Several moments of instability noted with gait and toe taps.  Moderate trunk sway when turning around 180 degrees during dynamic gait. Continue PT to increase strength/stability and balance to improve ease of ambulating community distances and decrease risk of falls. Patient will continue to benefit from skilled PT services to modify and progress therapeutic interventions, address functional mobility deficits, address ROM deficits, address strength deficits, analyze and cue movement patterns, analyze and modify body mechanics/ergonomics and address imbalance/dizziness to attain remaining goals. [x]  See Plan of Care  []  See progress note/recertification  []  See Discharge Summary         Progress towards goals / Updated goals:  Short Term Goals: To be accomplished in 2 weeks:  1. Patient will report performance of home exercise program 4 of 7 days in the next week demonstrating compliance to therapy program and progress towards independent management of symptoms. Eval: HEP provided and instructed.     Long Term Goals: To be accomplished in 4 weeks:  1. Patient will complete the TUG in <13 seconds with SPC to demonstrate decreased fall risk. Eval: 15 seconds with use of SPC   2. Patient will increase right LE strength grossly to at least 4+/5 to improve ease with ambulation and daily activities. Eval: 4-/5 grossly   3. Patient will increase right DF strength to at least 3+/5 to improve gait mechanics and efficiency with ambulation and decrease fall risk. Eval: 2/5 DF   4. Patient will increase 30 seconds sit<>stand to at least 14x to perform within age-related normative values and demonstrate improved LE strength and endurance to improve ease with transfers. Eval: 8x with decreased eccentric control; fatigued before 30\" was completed   5. Patient will increase MSR bilaterally to at least 30\" to demonstrate improved stabilization and control. Eval: 9\" on right, 11\" on left   6.  Patient will increase FOTO score to at least 54 to demonstrate improved tolerance to household activities.                Eval: 45       PLAN  []  Upgrade activities as tolerated     [x]  Continue plan of care  []  Update interventions per flow sheet       []  Discharge due to:_  []  Other:_      Dionicio Palomo, PTA 4/21/2022  1:27 PM    Future Appointments   Date Time Provider Tamir Santillan   4/21/2022  1:45 PM Toy Arlene, PTA [facility]   4/27/2022  1:30 PM Toy Arlene, PTA [facility]   4/29/2022  2:15 PM Rell David [facility]   5/4/2022  2:15 PM Elder Rebel, PTA [facility]   5/6/2022  2:15 PM Moore Idler [facility]   5/11/2022  2:15 PM Elder Rebel, PTA [facility]   5/13/2022  2:15 PM Moore Idler [facility]   9/27/2022  1:50 PM [facility] LAB VISIT [facility]   10/4/2022  2:00 PM Jose Ulloa MD [facility]

## 2022-04-27 ENCOUNTER — HOSPITAL ENCOUNTER (OUTPATIENT)
Dept: PHYSICAL THERAPY | Age: 67
Discharge: HOME | End: 2022-04-27
Payer: MEDICARE

## 2022-04-27 PROCEDURE — 97110 THERAPEUTIC EXERCISES: CPT

## 2022-04-27 PROCEDURE — 97112 NEUROMUSCULAR REEDUCATION: CPT

## 2022-04-29 ENCOUNTER — HOSPITAL ENCOUNTER (OUTPATIENT)
Dept: PHYSICAL THERAPY | Age: 67
Discharge: HOME | End: 2022-04-29
Payer: MEDICARE

## 2022-04-29 PROCEDURE — 97112 NEUROMUSCULAR REEDUCATION: CPT

## 2022-04-29 PROCEDURE — 97110 THERAPEUTIC EXERCISES: CPT

## 2022-04-29 NOTE — PROGRESS NOTES
PT DAILY TREATMENT NOTE     Patient Name: Derrick Martinez  Date:2022  : 1955  [x]  Patient  Verified  Payor: Mikal Naylor / Plan: [place] MEDICARE PART A & B / Product Type: Medicare /    In time: 215  Out time:257  Total Treatment Time (min): 42  Visit #: 4 of 8    Medicare/BCBS Only   Total Timed Codes (min):  42 1:1 Treatment Time:  42       Treatment Area: Other abnormalities of gait and mobility [R26.89]    SUBJECTIVE  Pain Level (0-10 scale): 0  Any medication changes, allergies to medications, adverse drug reactions, diagnosis change, or new procedure performed?: [x] No    [] Yes (see summary sheet for update)  Subjective functional status/changes:   [] No changes reported  Patient reports feeling better muscle engagement to anterior shin. OBJECTIVE    17 min Therapeutic Exercise:  [x] See flow sheet :   Rationale: increase ROM, increase strength and improve coordination to improve the patients ability to perform ADLs and self care tasks with greater ease     25 min Neuromuscular Re-education:  [x]  See flow sheet : slow march walking x 60, retro walking x 60', side stepping with peach TB x 30' each, tandem balance, step taps, dynadisc core stabilization series    Rationale: increase strength, improve coordination, improve balance and increase proprioception  to improve the patients ability to perform functional tasks with improved stabilization and control           With   [] TE   [] TA   [] neuro   [] other: Patient Education: [x] Review HEP    [] Progressed/Changed HEP based on:   [] positioning   [] body mechanics   [] transfers   [] heat/ice application    [] other:      Other Objective/Functional Measures: increased walking to 200 feet today without SPC, increased reps for most exercises today with good tolerance      Pain Level (0-10 scale) post treatment: 0    ASSESSMENT/Changes in Function: Patient with improved tolerance to all exercises today.  Patient reports feeling more \"stable\" with ambulation without cane today. Mild instability noted with tandem balance today requiring intermittent UE support on parallel bars to prevent LOB. Patient with no pain throughout session. Continue with therapy to progress balance, stabilization, strength, and mobility to promote return to PLOF. Patient will continue to benefit from skilled PT services to modify and progress therapeutic interventions, address functional mobility deficits, address ROM deficits, address strength deficits, analyze and address soft tissue restrictions, analyze and cue movement patterns, analyze and modify body mechanics/ergonomics, assess and modify postural abnormalities, address imbalance/dizziness and instruct in home and community integration to attain remaining goals. []  See Plan of Care  []  See progress note/recertification  []  See Discharge Summary         Progress towards goals / Updated goals:  Short Term Goals: To be accomplished in 2 weeks:  1. Patient will report performance of home exercise program 4 of 7 days in the next week demonstrating compliance to therapy program and progress towards independent management of symptoms.             Eval: HEP provided and instructed. Current: Met, reports HEP compliance. 4/27/2022     Long Term Goals: To be accomplished in 4 weeks:  1. Patient will complete the TUG in <13 seconds with SPC to demonstrate decreased fall risk.              Eval: 15 seconds with use of SPC   2. Patient will increase right LE strength grossly to at least 4+/5 to improve ease with ambulation and daily activities.                Eval: 4-/5 grossly   3.  Patient will increase right DF strength to at least 3+/5 to improve gait mechanics and efficiency with ambulation and decrease fall risk.               Eval: 2/5 DF   4. Patient will increase 30 seconds sit<>stand to at least 14x to perform within age-related normative values and demonstrate improved LE strength and endurance to improve ease with transfers.               Eval: 8x with decreased eccentric control; fatigued before 30\" was completed   5. Patient will increase MSR bilaterally to at least 30\" to demonstrate improved stabilization and control.              Eval: 9\" on right, 11\" on left    Current: progressing 4/29/2022 - tandem right 10\", left 12\"   6. Patient will increase FOTO score to at least 54 to demonstrate improved tolerance to household activities.                Eval: 45    PLAN  [x]  Upgrade activities as tolerated     []  Continue plan of care  []  Update interventions per flow sheet       []  Discharge due to:_  []  Other:_      Samy Thakkar, PT, DPT  4/29/2022  2:14 PM    Future Appointments   Date Time Provider Tamir Santillan   4/29/2022  2:15 PM Danielitoy Arms [facility]   5/4/2022  2:15 PM Lottie Amaya PTA [facility]   5/6/2022  2:15 PM Ana [facility]   5/11/2022  2:15 PM Lottie Amaya PTA [facility]   5/13/2022  2:15 PM Hodan Garcia [facility]   9/27/2022  1:50 PM [facility] LAB VISIT [facility]   10/4/2022  2:00 PM Ariel Cummins MD [facility]

## 2022-05-04 ENCOUNTER — HOSPITAL ENCOUNTER (OUTPATIENT)
Dept: PHYSICAL THERAPY | Age: 67
Discharge: HOME | End: 2022-05-04
Payer: MEDICARE

## 2022-05-04 PROCEDURE — 97112 NEUROMUSCULAR REEDUCATION: CPT

## 2022-05-04 PROCEDURE — 97110 THERAPEUTIC EXERCISES: CPT

## 2022-05-04 NOTE — PROGRESS NOTES
PT DAILY TREATMENT NOTE     Patient Name: Jas Franz  Date:2022  : 1955  [x]  Patient  Verified  Payor: [place] MEDICARE / Plan: [place] MEDICARE PART A & B / Product Type: Medicare /    In time: 2:15  Out time:3:08  Total Treatment Time (min): 53   Visit #: 5 of 8    Medicare/BCBS Only   Total Timed Codes (min):  53 1:1 Treatment Time:  53       Treatment Area: Other abnormalities of gait and mobility [R26.89]    SUBJECTIVE  Pain Level (0-10 scale): 0  Any medication changes, allergies to medications, adverse drug reactions, diagnosis change, or new procedure performed?: [x] No    [] Yes (see summary sheet for update)  Subjective functional status/changes:   [] No changes reported  Pt reports he is feeling good and has no pain. OBJECTIVE      17 min Therapeutic Exercise:  [x] See flow sheet :   Rationale: increase ROM, increase strength and improve coordination to improve the patients ability to perform functional task with ease. 36 min Neuromuscular Re-education:  [x]  See flow sheet :   Rationale: increase strength, improve coordination, improve balance and increase proprioception  to improve the patients ability to perform ADL's with ease            With   [] TE   [] TA   [] neuro   [] other: Patient Education: [x] Review HEP    [] Progressed/Changed HEP based on:   [] positioning   [] body mechanics   [] transfers   [] heat/ice application    [] other:      Other Objective/Functional Measures:      Pain Level (0-10 scale) post treatment: 0    ASSESSMENT/Changes in Function:   Increased reps to most exercises to improve endurance, strength and stability in the B LE to aid with ease of everyday activities. Sit to stands performed at the [facility] at 54 cm for 12 reps with no UE assist this visit. Pt required cueing to engage glutes and push through the B LE's but displays improved form and increased ease with standing with no UE use. Improved DF mobility of the right foot noted with testing. Cueing required to maintain neutral hips with side stepping with a peach band. Pt reports no pain post treatment and would benefit from continued treatment to improve B LE strength and stability to aid with ease of ADL's. Patient will continue to benefit from skilled PT services to modify and progress therapeutic interventions, address functional mobility deficits, address ROM deficits, address strength deficits, analyze and address soft tissue restrictions, analyze and cue movement patterns, analyze and modify body mechanics/ergonomics and assess and modify postural abnormalities to attain remaining goals. [x]  See Plan of Care  []  See progress note/recertification   []  See Discharge Summary         Progress towards goals / Updated goals:  Short Term Goals: To be accomplished in 2 weeks:  1. Patient will report performance of home exercise program 4 of 7 days in the next week demonstrating compliance to therapy program and progress towards independent management of symptoms.             Eval: HEP provided and instructed.              [id number]: Met, reports HEP compliance. 4/27/2022     Long Term Goals: To be accomplished in 4 weeks:  1. Patient will complete the TUG in <13 seconds with SPC to demonstrate decreased fall risk.              Eval: 15 seconds with use of SPC   2. Patient will increase right LE strength grossly to at least 4+/5 to improve ease with ambulation and daily activities.                Eval: 4-/5 grossly   3. Patient will increase right DF strength to at least 3+/5 to improve gait mechanics and efficiency with ambulation and decrease fall risk.               Eval: 2/5 DF    Current: progressing 5/4/22 right DF MMT 3-/5  4.  Patient will increase 30 seconds sit<>stand to at least 14x to perform within age-related normative values and demonstrate improved LE strength and endurance to improve ease with transfers.               Eval: 8x with decreased eccentric control; fatigued before 30\" was completed   5. Patient will increase MSR bilaterally to at least 30\" to demonstrate improved stabilization and control.              Eval: 9\" on right, 11\" on left               Current: progressing 4/29/2022 - tandem right 10\", left 12\"   6. Patient will increase FOTO score to at least 54 to demonstrate improved tolerance to household activities.                Eval: 45    PLAN  []  Upgrade activities as tolerated     [x]  Continue plan of care  []  Update interventions per flow sheet       []  Discharge due to:_  []  Other:_      Mary Alice Alas PTA 5/4/2022  2:12 PM    Future Appointments   Date Time Provider Tamir Jacque   5/4/2022  2:15 PM Mehdi Gardner PTA [facility]   5/6/2022  2:15 PM Blima Saltness [facility]   5/11/2022  2:15 PM Mehdi Gardner PTA [facility]   5/13/2022  2:15 PM Parish Vences [facility]   9/27/2022  1:50 PM [facility]   10/4/2022  2:00 PM Whit Smith MD [facility]

## 2022-05-06 ENCOUNTER — HOSPITAL ENCOUNTER (OUTPATIENT)
Dept: PHYSICAL THERAPY | Age: 67
Discharge: HOME | End: 2022-05-06
Payer: MEDICARE

## 2022-05-06 PROCEDURE — 97112 NEUROMUSCULAR REEDUCATION: CPT

## 2022-05-06 PROCEDURE — 97110 THERAPEUTIC EXERCISES: CPT

## 2022-05-06 NOTE — PROGRESS NOTES
PT DAILY TREATMENT NOTE     Patient Name: Claire Watts  Date:2022  : 1955  [x]  Patient  Verified  Payor: [employer] / Plan: [place] MEDICARE PART A & B / Product Type: Medicare /    In time: 216  Out time:258  Total Treatment Time (min): 42  Visit #: 6 of 8    Medicare/BCBS Only   Total Timed Codes (min):  42 1:1 Treatment Time:  42       Treatment Area: Other abnormalities of gait and mobility [R26.89]    SUBJECTIVE  Pain Level (0-10 scale): 0  Any medication changes, allergies to medications, adverse drug reactions, diagnosis change, or new procedure performed?: [x] No    [] Yes (see summary sheet for update)  Subjective functional status/changes:   [] No changes reported  Patient feels improved strength through his anterior tib and improved heel strike. OBJECTIVE     12 min Therapeutic Exercise:  [x] See flow sheet :   Rationale: increase ROM, increase strength and improve coordination to improve the patients ability to perform ADLs and self care tasks with greater ease     30 min Neuromuscular Re-education:  [x]  See flow sheet : Dynadisc series, dynamic gait series, tandem balance, step taps    Rationale: increase strength and improve coordination  to improve the patients ability to perform functional tasks with greater stabilization           With   [] TE   [] TA   [] neuro   [] other: Patient Education: [x] Review HEP    [] Progressed/Changed HEP based on:   [] positioning   [] body mechanics   [] transfers   [] heat/ice application    [] other:      Other Objective/Functional Measures: added small ninfa negotiation forward, increased resistance with side stepping, step taps to 8\" box      Pain Level (0-10 scale) post treatment: 0    ASSESSMENT/Changes in Function: Patient requiring VC to slow down and focus on stabilization with ninfa negotiation today. CGA provided for safety with mild LOB noted with hurdles. Intermittent UE support required for tandem balance today.  Patient quite fatigued by end of session, though no pain present. Recommend continued skilled PT to further improve strength, balance, and endurance as able to promote improved ease with daily activities and safety with ambulation. Patient will continue to benefit from skilled PT services to modify and progress therapeutic interventions, address functional mobility deficits, address ROM deficits, address strength deficits, analyze and address soft tissue restrictions, analyze and cue movement patterns, analyze and modify body mechanics/ergonomics, assess and modify postural abnormalities, address imbalance/dizziness and instruct in home and community integration to attain remaining goals. []  See Plan of Care  []  See progress note/recertification  []  See Discharge Summary         Progress towards goals / Updated goals:  Short Term Goals: To be accomplished in 2 weeks:  1. Patient will report performance of home exercise program 4 of 7 days in the next week demonstrating compliance to therapy program and progress towards independent management of symptoms.             Eval: HEP provided and instructed.              HNJJFQT: Met, reports HEP compliance. 4/27/2022     Long Term Goals: To be accomplished in 4 weeks:  1. Patient will complete the TUG in <13 seconds with SPC to demonstrate decreased fall risk.              Eval: 15 seconds with use of SPC   2. Patient will increase right LE strength grossly to at least 4+/5 to improve ease with ambulation and daily activities.                Eval: 4-/5 grossly   3. Patient will increase right DF strength to at least 3+/5 to improve gait mechanics and efficiency with ambulation and decrease fall risk.               Eval: 2/5 DF               Current: progressing 5/4/22 right DF MMT 3-/5  4.  Patient will increase 30 seconds sit<>stand to at least 14x to perform within age-related normative values and demonstrate improved LE strength and endurance to improve ease with transfers.               Eval: 8x with decreased eccentric control; fatigued before 30\" was completed   Current: progressing 5/6/2022 - 9x with improved eccentric control, no UE support   5. Patient will increase MSR bilaterally to at least 30\" to demonstrate improved stabilization and control.              Eval: 9\" on right, 11\" on left               Current: progressing 4/29/2022 - tandem right 10\", left 12\"   6. Patient will increase FOTO score to at least 54 to demonstrate improved tolerance to household activities.                Eval: 45    PLAN  [x]  Upgrade activities as tolerated     []  Continue plan of care  []  Update interventions per flow sheet       []  Discharge due to:_  []  Other:_      Benedicto Lim PT, DPT 5/6/2022  2:19 PM    Future Appointments   Date Time Provider Tamir Santillan   5/11/2022  2:15 PM Nataliya Null, PTA [facility]   5/13/2022  2:15 PM Sharif Ryans [facility]   9/27/2022  1:50 PM [facility]IT [facility]   10/4/2022  2:00 PM Kendall Bojorquez MD [facility]

## 2022-05-11 ENCOUNTER — HOSPITAL ENCOUNTER (OUTPATIENT)
Dept: PHYSICAL THERAPY | Age: 67
Discharge: HOME | End: 2022-05-11
Payer: MEDICARE

## 2022-05-11 PROCEDURE — 97110 THERAPEUTIC EXERCISES: CPT

## 2022-05-11 PROCEDURE — 97112 NEUROMUSCULAR REEDUCATION: CPT

## 2022-05-11 NOTE — PROGRESS NOTES
PT DAILY TREATMENT NOTE     Patient Name: Keven Church  Date:2022  : 1955  [x]  Patient  Verified  Payor: [place] MEDICARE / Plan: [place] MEDICARE PART A & B / Product Type: Medicare /    In time:2:15  Out time:3:04  Total Treatment Time (min): 49  Visit #: 7 of 8    Medicare/BCBS Only   Total Timed Codes (min):  49 1:1 Treatment Time:  45       Treatment Area: Other abnormalities of gait and mobility [R26.89]    SUBJECTIVE  Pain Level (0-10 scale): 0  Any medication changes, allergies to medications, adverse drug reactions, diagnosis change, or new procedure performed?: [x] No    [] Yes (see summary sheet for update)  Subjective functional status/changes:   [] No changes reported  Pt reports he is doing good today. OBJECTIVE    12 min Therapeutic Exercise:  [x] See flow sheet :   Rationale: increase ROM and increase strength to improve the patients ability to perform functional task with ease. 37 min Neuromuscular Re-education:  [x]  See flow sheet :   Rationale: increase strength, improve coordination, improve balance and increase proprioception  to improve the patients ability to perform ADL's with ease. With   [] TE   [] TA   [] neuro   [] other: Patient Education: [x] Review HEP    [] Progressed/Changed HEP based on:   [] positioning   [] body mechanics   [] transfers   [] heat/ice application    [] other:      Other Objective/Functional Measures:   Standing single leg TR- 10x ea       Pain Level (0-10 scale) post treatment: 0    ASSESSMENT/Changes in Function:   Improved right hip strength noted with testing this visit with pt progressing towards LTG #2. Pt continues to be challenged with B TR in standing however was able to perform SL TR for 10 reps in the //: bars. Continued fatigue throughout therec but pt puts forth good effort. No pain post treatment.     Patient will continue to benefit from skilled PT services to modify and progress therapeutic interventions, address functional mobility deficits, address ROM deficits, address strength deficits, analyze and address soft tissue restrictions, analyze and cue movement patterns, analyze and modify body mechanics/ergonomics and assess and modify postural abnormalities to attain remaining goals. [x]  See Plan of Care  []  See progress note/recertification  []  See Discharge Summary         Progress towards goals / Updated goals:  Short Term Goals: To be accomplished in 2 weeks:  1. Patient will report performance of home exercise program 4 of 7 days in the next week demonstrating compliance to therapy program and progress towards independent management of symptoms.             Eval: HEP provided and instructed.              IHAIXLL: Met, reports HEP compliance. 4/27/2022     Long Term Goals: To be accomplished in 4 weeks:  1. Patient will complete the TUG in <13 seconds with SPC to demonstrate decreased fall risk.              Eval: 15 seconds with use of SPC   2. Patient will increase right LE strength grossly to at least 4+/5 to improve ease with ambulation and daily activities.                Eval: 4-/5 grossly    Current: progressing 5/11/22 4/5 grossly  3. Patient will increase right DF strength to at least 3+/5 to improve gait mechanics and efficiency with ambulation and decrease fall risk.               Eval: 2/5 DF               VFQUYQM: progressing 5/4/22 right DF MMT 3-/5  4. Patient will increase 30 seconds sit<>stand to at least 14x to perform within age-related normative values and demonstrate improved LE strength and endurance to improve ease with transfers.               Eval: 8x with decreased eccentric control; fatigued before 30\" was completed              Current: progressing 5/6/2022 - 9x with improved eccentric control, no UE support   5.  Patient will increase MSR bilaterally to at least 30\" to demonstrate improved stabilization and control.              Eval: 9\" on right, 11\" on left               Current: progressing 4/29/2022 - tandem right 10\", left 12\"   6. Patient will increase FOTO score to at least 54 to demonstrate improved tolerance to household activities.                Eval: 45    PLAN  []  Upgrade activities as tolerated     [x]  Continue plan of care  []  Update interventions per flow sheet       []  Discharge due to:_  []  Other:_      Sissy , PTA 5/11/2022  2:17 PM    Future Appointments   Date Time Provider Tamir Santillan   5/13/2022  2:15 PM Bethany Conte [facility]   9/27/2022  1:50 PM [facility] VIS[facility]   10/4/2022  2:00 PM Rea Oglesby MD [facility]

## 2022-05-13 ENCOUNTER — APPOINTMENT (OUTPATIENT)
Dept: PHYSICAL THERAPY | Age: 67
End: 2022-05-13
Payer: MEDICARE

## 2022-05-13 ENCOUNTER — TELEPHONE (OUTPATIENT)
Dept: PHYSICAL THERAPY | Age: 67
End: 2022-05-13

## 2022-06-01 ENCOUNTER — APPOINTMENT (OUTPATIENT)
Dept: PHYSICAL THERAPY | Age: 67
End: 2022-06-01

## 2022-06-07 NOTE — PROGRESS NOTES
PT DAILY TREATMENT NOTE     Patient Name: Genesis Simon  Date:2022  : 1955  [x]  Patient  Verified  Payor: Chad Deleon / Plan: [place] MEDICARE PART A & B / Product Type: Medicare /    In time:1:30  Out time:2:11  Total Treatment Time (min): 41  Visit #: 3 of 8    Medicare/BCBS Only   Total Timed Codes (min):  41 1:1 Treatment Time:  41       Treatment Area: Other abnormalities of gait and mobility [R26.89]    SUBJECTIVE  Pain Level (0-10 scale): 0/10  Any medication changes, allergies to medications, adverse drug reactions, diagnosis change, or new procedure performed?: [x] No    [] Yes (see summary sheet for update)  Subjective functional status/changes:   [] No changes reported  \"Feel a little stronger, can go up the stairs easier. \"    OBJECTIVE    16 min Therapeutic Exercise:  [x] See flow sheet :   Rationale: increase ROM and increase strength to improve the patients ability to perform ADL's.    25 min Neuromuscular Re-education:  [x]  See flow sheet : Tandem on level surface. 6\" step taps, sit to stand, seated on dalton disc series, dynamic gait: retro gait, marching, peach resisted side-steps. Rationale: increase strength, improve coordination, improve balance and increase proprioception  to improve the patients ability to perform functional activities and ambulate safely in the community. With   [x] TE   [] TA   [] neuro   [] other: Patient Education: [x] Review HEP    [] Progressed/Changed HEP based on:   [] positioning   [] body mechanics   [] transfers   [] heat/ice application    [] other:      Other Objective/Functional Measures: Increased MSR to tandem, occasional tapping on handrail to maintain balance. Increased step ups to 6\" height and added lateral step ups. Added peach t-band resistance to lateral side-steps.      Pain Level (0-10 scale) post treatment: 0/10    ASSESSMENT/Changes in Function: Demonstrated improved sitting balance on dalton disc and with standing tandem balance. Pt reported feeling a bit fatigued/tired post-treatment. No notable change in Right ankle DF AROM at this time. Continue PT to increase strength/stability and endurance to improve ease with performing functional activities. Patient will continue to benefit from skilled PT services to modify and progress therapeutic interventions, address functional mobility deficits, address ROM deficits, address strength deficits, analyze and address soft tissue restrictions, analyze and cue movement patterns, analyze and modify body mechanics/ergonomics and address imbalance/dizziness to attain remaining goals. [x]  See Plan of Care  []  See progress note/recertification  []  See Discharge Summary         Progress towards goals / Updated goals:  Short Term Goals: To be accomplished in 2 weeks:  1. Patient will report performance of home exercise program 4 of 7 days in the next week demonstrating compliance to therapy program and progress towards independent management of symptoms.             Eval: HEP provided and instructed. Current: Met, reports HEP compliance. 4/27/2022     Long Term Goals: To be accomplished in 4 weeks:  1. Patient will complete the TUG in <13 seconds with SPC to demonstrate decreased fall risk.              Eval: 15 seconds with use of SPC   2. Patient will increase right LE strength grossly to at least 4+/5 to improve ease with ambulation and daily activities.                Eval: 4-/5 grossly   3. Patient will increase right DF strength to at least 3+/5 to improve gait mechanics and efficiency with ambulation and decrease fall risk.               Eval: 2/5 DF   4. Patient will increase 30 seconds sit<>stand to at least 14x to perform within age-related normative values and demonstrate improved LE strength and endurance to improve ease with transfers.               Eval: 8x with decreased eccentric control; fatigued before 30\" was completed   5.  Patient will increase MSR bilaterally to at least 30\" to demonstrate improved stabilization and control.              Eval: 9\" on right, 11\" on left   6. Patient will increase FOTO score to at least 54 to demonstrate improved tolerance to household activities.                Eval: 45    PLAN  []  Upgrade activities as tolerated     []  Continue plan of care  []  Update interventions per flow sheet       []  Discharge due to:_  []  Other:_      Nita Mccloud PTA 4/27/2022  1:30 PM    Future Appointments   Date Time Provider Tamir Santillan   4/29/2022  2:15 PM Blima Saltness [facility]   5/4/2022  2:15 PM Mehdi Gardner PTA [facility]   5/6/2022  2:15 PM Blima Saltness [facility]   5/11/2022  2:15 PM Mehdi Gardner PTA [facility]   5/13/2022  2:15 PM Parish Vences [facility]   9/27/2022  1:50 PM [facility]   10/4/2022  2:00 PM Whit Smith MD [facility] [de-identified] : 72-year-old female with a history of diabetes mellitus, dyslipidemia, hypertension, osteoarthritis presents today for annual exam\par , Being followed by Ortho\par Dyslipidemia fairly well controlled \par Hypertension patient did not take medications this morning due to the annual exam blood pressure after repeat was 140/80\par History of pulmonary nodules on screening lung exams is a former smoker noting greater than 50-year\par

## 2022-06-14 NOTE — PROGRESS NOTES
[facility]  [address] [facility] [address]  [place], [address]  [phone] fax    Physical Therapy Discharge Summary    Patient name: Claire Watts Start of Care: 2022   Referral source: Cynthia Fischer NP : 1955   Medical/Treatment Diagnosis: Other abnormalities of gait and mobility [R26.89]  Payor: [place] MEDICARE / Plan: [place] MEDICARE PART A & B / Product Type: Medicare /  Onset Date: chronic, worsening over the last 4 years              Prior Hospitalization: see medical history Provider#: [id number]   Medications: Verified on Patient Summary List    Comorbidities: Arthritis, Diabetes Type I or II, Hearing Impairment, High Blood Pressure, Visual  Impairment   Prior Level of Function: ambulating with SPC, independent with ADLs/IADLs   Visits from Start of Care: 7    Missed Visits: 1  Reporting Period : 2022 to 2022    Summary of Care:  Short Term Goals: To be accomplished in 2 weeks:  1. Patient will report performance of home exercise program 4 of 7 days in the next week demonstrating compliance to therapy program and progress towards independent management of symptoms.             Eval: HEP provided and instructed.              [id number]: Met, reports HEP compliance     Long Term Goals: To be accomplished in 4 weeks:  1. Patient will complete the TUG in <13 seconds with SPC to demonstrate decreased fall risk.              Eval: 15 seconds with use of SPC   Current: unable to assess    2. Patient will increase right LE strength grossly to at least 4+/5 to improve ease with ambulation and daily activities.                Eval: 4-/5 grossly               Current: progressing - 4/5 grossly  3. Patient will increase right DF strength to at least 3+/5 to improve gait mechanics and efficiency with ambulation and decrease fall risk.               Eval: 2/5 DF               Current: progressing - right DF MMT 3-/5  4.  Patient will increase 30 seconds sit<>stand to at least 14x to perform within age-related normative values and demonstrate improved LE strength and endurance to improve ease with transfers.               Eval: 8x with decreased eccentric control; fatigued before 30\" was completed              Current: progressing - 9x with improved eccentric control, no UE support   5. Patient will increase MSR bilaterally to at least 30\" to demonstrate improved stabilization and control.              Eval: 9\" on right, 11\" on left               Current: progressing - tandem right 10\", left 12\"   6. Patient will increase FOTO score to at least 54 to demonstrate improved tolerance to household activities.                Eval: 45   Current: unable to assess       ASSESSMENT/RECOMMENDATIONS: Patient was progressing well with therapy, however had eye surgery that required him to be out of therapy for 3 weeks and patient has not returned since. Unable to further assess goals due to attendance. Patient discharged at this time with no further instructions.  Thank you for this referral!     [x]Discontinue therapy: []Patient has reached or is progressing toward set goals      [x]Patient has abdicated      []Due to lack of appreciable progress towards set goals    Samy Thakkar, PT, DPT 6/14/2022 9:03 AM

## 2022-09-24 LAB — HBA1C MFR BLD HPLC: 7.9 %

## 2022-10-08 DIAGNOSIS — E78.5 DYSLIPIDEMIA: ICD-10-CM

## 2022-10-10 RX ORDER — ATORVASTATIN CALCIUM 80 MG/1
TABLET, FILM COATED ORAL
Qty: 90 TABLET | Refills: 3 | Status: SHIPPED | OUTPATIENT
Start: 2022-10-10 | End: 2022-10-11

## 2022-10-11 DIAGNOSIS — E78.5 DYSLIPIDEMIA: ICD-10-CM

## 2022-10-11 RX ORDER — ATORVASTATIN CALCIUM 80 MG/1
TABLET, FILM COATED ORAL
Qty: 90 TABLET | Refills: 3 | Status: SHIPPED | OUTPATIENT
Start: 2022-10-11

## 2022-10-23 NOTE — PROGRESS NOTES
This is a Subsequent Medicare Annual Wellness Visit     I have reviewed the patient's medical history in detail and updated the computerized patient record. Assessment/Plan   Education and counseling provided:  Are appropriate based on today's review and evaluation  End-of-Life planning (with patient's consent)  Prostate cancer screening tests (PSA, covered annually)  Colorectal cancer screening tests  Cardiovascular screening blood test    1. Medicare annual wellness visit, subsequent  2. Colon adenoma 2/15 Dr. Alec Morley  3. Type 2 diabetes, controlled, with neuropathy ([facility]  4. Type 2 diabetes with nephropathy ([facility]  5. S/P kidney transplant  6. Primary hypertension  7. Dyslipidemia  -     ezetimibe (ZETIA) 10 mg tablet; Take 1 Tablet by mouth daily. , Normal, Disp-30 Tablet, R-11  8. Erectile dysfunction, unspecified erectile dysfunction type  -     tadalafiL (CIALIS) 20 mg tablet; Take 1 Tablet by mouth daily as needed for Erectile Dysfunction. , Normal, Disp-10 Tablet, R-5  9. Advanced directives, counseling/discussion  -     ADVANCE CARE PLANNING FIRST 30 MINS       Depression Risk Factor Screening     3 most recent PHQ Screens 10/31/2022   Little interest or pleasure in doing things Not at all   Feeling down, depressed, irritable, or hopeless Not at all   Total Score PHQ 2 0       Alcohol & Drug Abuse Risk Screen    Do you average more than 1 drink per night or more than 7 drinks a week: No    In the past three months have you have had more than 4 drinks containing alcohol on one occasion: No          Functional Ability and Level of Safety    Hearing: Hearing is good. Activities of Daily Living: The home contains: no safety equipment. Patient does total self care      Ambulation: with no difficulty     Fall Risk:  Fall Risk Assessment, last 12 mths 10/31/2022   Able to walk? Yes   Fall in past 12 months? 0   Do you feel unsteady?  0   Are you worried about falling 0      Abuse Screen:  Patient is not abused       Cognitive Screening    Has your family/caregiver stated any concerns about your memory: no     Cognitive Screening: Normal - Mini Cog Test    Health Maintenance Due     Health Maintenance Due   Topic Date Due    COVID-19 Vaccine (3 - Moderna risk series) 04/05/2021    Flu Vaccine (1) 08/01/2022    A1C test (Diabetic or Prediabetic)  08/06/2022    Depression Screen  09/29/2022       Patient Care Team   Patient Care Team:  Chris Barry MD as PCP - General (Internal Medicine Physician)  Chris Barry MD as PCP - [facility]  Amie Shah MD (Ophthalmology)    History     Patient Active Problem List   Diagnosis Code    Sleep apnea Dr. Shantell Tejada G47.30    Colon adenoma 2/15 Dr. Alec Morley D12.6    Erectile dysfunction N52.9    Sarcoidosis D86.9    Dyslipidemia E78.5    Primary hypertension I10    GERD without esophagitis K21.9    Arthritis, degenerative M19.90    Advance directive discussed with patient Z71.89    Diabetic retinopathy ([address] E11.319    Type 2 diabetes, controlled, with neuropathy ([address] Dr Yasmin Alonso E11.40    Type 2 diabetes with nephropathy ([address] E11.21    S/P kidney transplant Z94.0     Past Medical History:   Diagnosis Date    Acute hearing loss of right ear     Acute urinary retention 08/2015    Anemia of chronic disease 2010    saw Dr. Fouzia Nielson in past    Asbestosis(501)     CHF (congestive heart failure) ([address] 2011    Chronic edema     Colon adenoma     Dr. Alec Morley 2009, 2/15, 4/18    DM (diabetes mellitus) ([address]     w neuropathy and retinopathy Dr Bipin Coughlin; now beata Wood; EMG 2/22 Dr Tiana Kim    Dyslipidemia     ED (erectile dysfunction)     ESRD on hemodialysis [facility])     Dr. Manohar Calix, M-W-F; [facility]    FHx: heart disease     GERD (gastroesophageal reflux disease)     Glaucoma     Dr. Velvet Daily    Hyperlipidemia     Hypertension     Nephrolithiasis     ALFREDO (obstructive sleep apnea)     NOT using cpap (states resolved) Osteoarthritis     Polycystic kidney 10/21/2019    Right foot drop 2019    S/P kidney transplant 2019    [facility]    Sarcoidosis     Vitamin D deficiency       Past Surgical History:   Procedure Laterality Date    COLONOSCOPY N/A 04/10/2018    Dr. Omero Tong adenoma , 2/15; adenoma and hyperplastic 4/10/18    HX CATARACT REMOVAL      HX ORTHOPAEDIC  2018    left wrist and humerus fx Dr Aron [place] RENAL TRANSPLANT  2019     donor Dr. Lincoln Medina [facility]    HX UROLOGICAL  2015    cystoscopy negative Dr Ashtyn Frias  08/10/2019    exploratinon of kidney transplant w/ evac of subcutaneous hematoma, core biopsy of transplanted kidney, irrigation and evac of bladder clots Dr. Aneta Hopkins  2017    cath Dr Jacinta Johnson; diffuse luminal irregularities without stenosis Cv, LAD, 20-30% mid RCA     [facility] CARDIAC SURG PROCEDURE UNLIST      neg thallium    NH INTRO CATH DIALYSIS CIRCUIT DX ANGRPH FLUOR S&I N/A 2018    Fistulogram Left performed by Jossie Kraft MD at [facility] CATH LAB    VASCULAR SURGERY PROCEDURE UNLIST  2014    nl BALDEMAR bilat    VASCULAR SURGERY PROCEDURE UNLIST      LEFT ARM GRAFT FOR HD     Current Outpatient Medications   Medication Sig Dispense Refill    bimatoprost (LUMIGAN) 0.01 % ophthalmic drops 1 Drop At bedtime. Alphagan P 0.1 % ophthalmic solution       tadalafiL (CIALIS) 20 mg tablet Take 1 Tablet by mouth daily as needed for Erectile Dysfunction. 10 Tablet 5    ezetimibe (ZETIA) 10 mg tablet Take 1 Tablet by mouth daily. 30 Tablet 11    omeprazole (PRILOSEC) 40 mg capsule Take 1 Capsule by mouth daily. 90 Capsule 3    atorvastatin (LIPITOR) 80 mg tablet TAKE ONE TABLET BY MOUTH DAILY 90 Tablet 3    tacrolimus (PROGRAF) 1 mg capsule Take  by mouth every twelve (12) hours. dapsone 100 mg tablet Take 100 mg by mouth daily. phosphorus (K PHOS NEUTRAL) 250 mg tablet Take 1 Tab by mouth three (3) times daily. brimonidine-timoloL (COMBIGAN) 0.2-0.5 % drop ophthalmic solution Administer 1 Drop to both eyes every twelve (12) hours. magnesium oxide (MAG-OX) 400 mg tablet Take 400 mg by mouth daily. insulin NPH human isophane (HUMULIN N NPH INSULIN KWIKPEN SC) 30 Units by SubCUTAneous route daily. mycophenolic acid (MYFORTIC) 120 mg delayed release tablet Take 180 mg by mouth two (2) times a day. predniSONE (DELTASONE) 5 mg tablet Take 10 mg by mouth daily. NIFEdipine ER (PROCARDIA XL) 90 mg ER tablet Take 1 Tab by mouth daily. 90 Tab 3    cinacalcet 60 mg tab Take 60 mg by mouth nightly. glucose blood VI test strips (ASCENSIA AUTODISC VI, ONE TOUCH ULTRA TEST VI) strip 100 Each. (Patient not taking: Reported on 10/31/2022)      DUREZOL 0.05 % ophthalmic emulsion Administer  to both eyes daily.  (Patient not taking: Reported on 10/31/2022)  3     No Known Allergies    Family History   Problem Relation Age of Onset    Diabetes Father     Heart Disease Sister     Diabetes Brother     Heart Disease Brother     Diabetes Brother     Diabetes Sister      Social History     Tobacco Use    Smoking status: Never    Smokeless tobacco: Never   Substance Use Topics    Alcohol use: No         Sara Toro MD

## 2022-10-23 NOTE — PROGRESS NOTES
74 y.o. BLACK/ male who presents for f/u evaluation. Wife was present for the evaluation    He continues to see Dr Marcelo Benson and also Dr Javy Antunez at the transplant clinic and reports that he is doing well     No cardiovascular complaints. Not set exercise outside of adls, more motivational than anything    Denies any GI or  complaints. He is wanting to try something for the ED issues    Denies polyuria, polydipsia, nocturia, vision change.  Weight is stable  He is being managed by Dr Tenisha Cantu and the last a1c was in the high 6 range apparently    LAST MEDICARE WELLNESS EXAM: 14, 9/24/15, 16, 3/23/18, 2/15/19, 20, 21, 10/31/22    Past Medical History:   Diagnosis Date    Acute hearing loss of right ear     Acute urinary retention 2015    Anemia of chronic disease     saw Dr. Cindy King in past    Asbestosis(501)     CHF (congestive heart failure) [facility]     Chronic edema     Colon adenoma     Dr. Earnest Meehan , 2/15,     DM (diabetes mellitus) ([facility]     w neuropathy and retinopathy Dr Cathleen Mark; now seing Dr Tenisha Cantu; EMG  Dr Lender Councilman    Dyslipidemia     ED (erectile dysfunction)     ESRD on hemodialysis [facility])     Dr. Manuel Gallego, M-W-F; s/p cadaveric renal transplant 2019 [facility]    FHx: heart disease     GERD (gastroesophageal reflux disease)     Glaucoma     Dr. Mcgee Apo    Hyperlipidemia     Hypertension     Nephrolithiasis     ALFREDO (obstructive sleep apnea)     NOT using cpap (states resolved)    Osteoarthritis     Polycystic kidney 10/21/2019    Right foot drop 2019    S/P kidney transplant 2019    [facility]    Sarcoidosis     Vitamin D deficiency      Past Surgical History:   Procedure Laterality Date    COLONOSCOPY N/A 04/10/2018    Dr. Earnest Meehan adenoma , 2/15; adenoma and hyperplastic 4/10/18    HX CATARACT REMOVAL      HX ORTHOPAEDIC  2018    left wrist and humerus fx Dr Cecille Cid RENAL TRANSPLANT  2019     donor Dr. Renu Spencer [facility]    [address]  2015 cystoscopy negative Dr Frantz Holland  08/10/2019    exploratinon of kidney transplant w/ evac of subcutaneous hematoma, core biopsy of transplanted kidney, irrigation and evac of bladder clots Dr. Oksana Brown  03/28/2017    cath Dr Paco Figueroa; diffuse luminal irregularities without stenosis Cv, LAD, 20-30% mid RCA     [facility] CARDIAC SURG PROCEDURE UNLIST  2009    neg thallium    [facility] INTRO CATH DIALYSIS CIRCUIT DX ANGRPH FLUOR S&I N/A 08/01/2018    Fistulogram Left performed by Samir Navarrete MD at [facility] CATH LAB    VASCULAR SURGERY PROCEDURE UNLIST  03/2014    nl BALDEMAR bilat    VASCULAR SURGERY PROCEDURE UNLIST      LEFT ARM GRAFT FOR HD     Social History     Socioeconomic History    Marital status:      Spouse name: Not on file    Number of children: 3    Years of education: Not on file    Highest education level: Not on file   Occupational History    Occupation: [employer]   Tobacco Use    Smoking status: Never    Smokeless tobacco: Never   Substance and Sexual Activity    Alcohol use: No    Drug use: No    Sexual activity: Never   Other Topics Concern    Not on file   Social History Narrative    Not on file     Social Determinants of Health     Financial Resource Strain: Not on file   Food Insecurity: Not on file   Transportation Needs: Not on file   Physical Activity: Not on file   Stress: Not on file   Social Connections: Not on file   Intimate Partner Violence: Not on file   Housing Stability: Not on file     No Known Allergies    Current Outpatient Medications   Medication Sig    bimatoprost (LUMIGAN) 0.01 % ophthalmic drops 1 Drop At bedtime. Alphagan P 0.1 % ophthalmic solution     tadalafiL (CIALIS) 20 mg tablet Take 1 Tablet by mouth daily as needed for Erectile Dysfunction. ezetimibe (ZETIA) 10 mg tablet Take 1 Tablet by mouth daily. omeprazole (PRILOSEC) 40 mg capsule Take 1 Capsule by mouth daily.     atorvastatin (LIPITOR) 80 mg tablet TAKE ONE TABLET BY MOUTH DAILY    tacrolimus (PROGRAF) 1 mg capsule Take  by mouth every twelve (12) hours. dapsone 100 mg tablet Take 100 mg by mouth daily. phosphorus (K PHOS NEUTRAL) 250 mg tablet Take 1 Tab by mouth three (3) times daily. brimonidine-timoloL (COMBIGAN) 0.2-0.5 % drop ophthalmic solution Administer 1 Drop to both eyes every twelve (12) hours. magnesium oxide (MAG-OX) 400 mg tablet Take 400 mg by mouth daily. insulin NPH human isophane (HUMULIN N NPH INSULIN KWIKPEN SC) 30 Units by SubCUTAneous route daily. mycophenolic acid (MYFORTIC) 004 mg delayed release tablet Take 180 mg by mouth two (2) times a day. predniSONE (DELTASONE) 5 mg tablet Take 10 mg by mouth daily. NIFEdipine ER (PROCARDIA XL) 90 mg ER tablet Take 1 Tab by mouth daily. cinacalcet 60 mg tab Take 60 mg by mouth nightly. glucose blood VI test strips (ASCENSIA AUTODISC VI, ONE TOUCH ULTRA TEST VI) strip 100 Each. (Patient not taking: Reported on 10/31/2022)    DUREZOL 0.05 % ophthalmic emulsion Administer  to both eyes daily. (Patient not taking: Reported on 10/31/2022)     No current facility-administered medications for this visit. REVIEW OF SYSTEMS: colo Dr Clyde Serra 4/18, sees Dr Chikis Wang, Dr Cristal Vergara in past  Ophtho - no vision change or eye pain  Oral - no mouth pain, tongue or tooth problems  Ears - no hearing loss, ear pain, fullness, no swallowing problems  Cardiac - no CP, PND, orthopnea, edema, palpitations or syncope  Chest - no breast masses  Resp - no wheezing, chronic coughing, dyspnea  GI - no heartburn, nausea, vomiting, change in bowel habits, bleeding, hemorrhoids  Urinary - no dysuria, hematuria, flank pain, urgency, frequency    Visit Vitals  /63   Pulse 85   Temp 97.3 °F (36.3 °C) (Temporal)   Resp 16   Ht 5' 11\" (1.803 m)   Wt 177 lb (80.3 kg)   SpO2 98%   BMI 24.69 kg/m²     A&O x3  Affect is appropriate.   Mood stable  No apparent distress  Anicteric, no JVD, adenopathy or thyromegaly. No carotid bruits or radiated murmur  Lungs clear to auscultation, no wheezes or rales   Heart --Regular rate and rhythm, 2/6 ERNESTINA lsb without radiation, no rubs, gallops, or clicks. Abdomen -- Soft and nontender, no hepatosplenomegaly or masses. Extremities -- Without cyanosis, clubbing. 2+ pulses equally and bilaterally.   No edema noted, LUE AV fistula w thrill and bruit  Pulses 1-2+ DP and PT    LABS  From 2/11 showed   gluc 131, cr 4.5,             hba1c 6.1,                   chol 187, tg 154, hdl 25, ldl-c 131, wbc 9.8,  hb 8.7,   plt 374, psa 1.00  From 11/11 showed gluc 114,              hba1c 5.6,             chol 157, tg 157, hdl 36, ldl-c 90  From 5/12 showed     alt 13, hba1c 6.1, ldl-p 1661, chol 184, tg 192, hdl 27, ldl-c 119  From 11/12 showed gluc 130, cr 6.0, gfr 11, alt 9,   hba1c 5.4, ldl-p 884,            wbc 8.0,  hb 10.3  plt 324  From 8/13 showed   gluc 144, cr 8.3,  alt<5,  hba1c 5.7,             chol 169, tg 327, hdl 20, ldl-c 84   From 3/14 showed   gluc 125, cr 6.9,  alt<5,  hba1c 5.8,             chol 141, tg 233, hdl 24, ldl-c 70,  wbc 5.7,   hb 10.4, plt 239  From 9/14 showed                hba1c 5.7,                        psa 1.60, vit d 32.8  From 8/15 showed   gluc 151,   alt 14,                wbc 11.9, hb 11.5, plt 189, lip 283  From 9/15 showed   gluc 110, cr 6.51, gfr 9, alt<5,  hba1c 5.6,             chol 182, tg 219, hdl 26, ldl-c 112, wbc 4.5,   hb 11.7, plt 245  From 2/16 showed                hba1c 5.6,             chol 183, tg 169, hdl 34, ldl-c 115  Form 6/16 showed   gluc 124, cr 8.22,             hba1c 6.0,             chol 140, tg 184, hdl 30, ldl-c 73  From 12/16 showed               alt 15, hba1c 4.8,             chol 138, tg 138, hdl 33, ldl-c 77  From 3/18 showed   gluc 103,   alt 13, hba1c 4.6,                   chol 150, tg 108, hdl 35, ldl-c 93  From 2/19 showed   gluc 91,    alt 18, hba1c 5.5,             chol 216, tg 169, hdl 33, ldl-c 149, wbc 4.9, hb 12.9, plt 265  From 6/20 showed               hba1c 4.8,         ldl-c 62  From 11/20 showed               hba1c 8.4  From 3/21 showed                hba1c 8.1,             chol 167, tg 231, hdl 29, ldl-c 92  From 8/21 showed   gluc 109, cr 1.40,          alt 65, hba1c 7.7,             chol 161, tg 260, hdl 29, ldl-c 81,  wbc 11.4, hb 17.2, plt 253  From 10/22 showed gluc 83,   cr 1.41,  alt 57,               chol 162, tg 226, hdl 32, ldl-c 85,  wbc 10.0, hb 16.4, plt 227    We reviewed the patient's labs from the last several visits to point out trends in the numbers          Patient Active Problem List   Diagnosis Code    Sleep apnea Dr. Aparna Redmond G47.30    Colon adenoma 2/15 Dr. Sarah Flowers D12.6    Erectile dysfunction N52.9    Sarcoidosis D86.9    Dyslipidemia E78.5    Primary hypertension I10    GERD without esophagitis K21.9    Arthritis, degenerative M19.90    Advance directive discussed with patient Z71.89    Diabetic retinopathy ([facility] E11.319    Type 2 diabetes, controlled, with neuropathy ([facility] Dr Marcela Mojica E11.40    Type 2 diabetes with nephropathy ([facility] E11.21    S/P kidney transplant Z94.0     ASSESSMENT AND PLAN:  DM.  Well-controlled and follow up Dr Krysta Black, f/u Dr. Becky Yu and Dr Prachi Smith  Hypertension. Continue current   Hyperlipidemia. Ldl mostly above 70, will add zetia fater discussing sfx  S/P renal transplant. F/u transplant clinic and Dr Armando Mendoza. Fiber, will send referral for colo  Ortho. F/U Dr Borja Smoke  Sleep apnea. Off cpap. F/U Dr. Aparna Redmond as needed  Ophtho. F/U Dr. Chong Amel  GERD. Daily ppi, avoidance measures  10. ED. Prn cialis after discussing risks and benefits              RTC 2/23    Above conditions discussed at length and patient vocalized understanding. All questions answered to patient satisfaction        ICD-10-CM ICD-9-CM    1. Medicare annual wellness visit, subsequent  Z00.00 V70.0       2.  Colon adenoma 2/15 Dr. Sarah Flowers  D12.6 211.3 REFERRAL TO GASTROENTEROLOGY      3. Type 2 diabetes, controlled, with neuropathy (HCC)  E11.40 250.60      357.2       4. Type 2 diabetes with nephropathy (HCC)  E11.21 250.40      583.81       5. S/P kidney transplant  Z94.0 V42.0       6. Primary hypertension  I10 401.9       7. Dyslipidemia  E78.5 272.4 ezetimibe (ZETIA) 10 mg tablet      8. Erectile dysfunction, unspecified erectile dysfunction type  N52.9 607.84 tadalafiL (CIALIS) 20 mg tablet      9.  Advanced directives, counseling/discussion  Z71.89 V65.49 ADVANCE CARE PLANNING FIRST 30 MINS

## 2022-10-24 ENCOUNTER — HOSPITAL ENCOUNTER (OUTPATIENT)
Dept: LAB | Age: 67
Discharge: HOME | End: 2022-10-24
Payer: MEDICARE

## 2022-10-24 ENCOUNTER — TELEPHONE (OUTPATIENT)
Dept: INTERNAL MEDICINE CLINIC | Age: 67
End: 2022-10-24

## 2022-10-24 ENCOUNTER — APPOINTMENT (OUTPATIENT)
Dept: INTERNAL MEDICINE CLINIC | Age: 67
End: 2022-10-24

## 2022-10-24 DIAGNOSIS — E11.40 TYPE 2 DIABETES, CONTROLLED, WITH NEUROPATHY (HCC): ICD-10-CM

## 2022-10-24 LAB
ALBUMIN SERPL-MCNC: 3.4 G/DL (ref 3.4–5)
ALBUMIN/GLOB SERPL: 0.8 {RATIO} (ref 0.8–1.7)
ALP SERPL-CCNC: 132 U/L (ref 45–117)
ALT SERPL-CCNC: 57 U/L (ref 16–61)
ANION GAP SERPL CALC-SCNC: 4 MMOL/L (ref 3–18)
AST SERPL-CCNC: 29 U/L (ref 10–38)
BILIRUB SERPL-MCNC: 0.5 MG/DL (ref 0.2–1)
BUN SERPL-MCNC: 26 MG/DL (ref 7–18)
BUN/CREAT SERPL: 18 (ref 12–20)
CALCIUM SERPL-MCNC: 10.6 MG/DL (ref 8.5–10.1)
CHLORIDE SERPL-SCNC: 104 MMOL/L (ref 100–111)
CHOLEST SERPL-MCNC: 162 MG/DL
CO2 SERPL-SCNC: 30 MMOL/L (ref 21–32)
CREAT SERPL-MCNC: 1.41 MG/DL (ref 0.6–1.3)
ERYTHROCYTE [DISTWIDTH] IN BLOOD BY AUTOMATED COUNT: 14.6 % (ref 11.6–14.5)
GLOBULIN SER CALC-MCNC: 4.1 G/DL (ref 2–4)
GLUCOSE SERPL-MCNC: 83 MG/DL (ref 74–99)
HCT VFR BLD AUTO: 50.8 % (ref 36–48)
HDLC SERPL-MCNC: 32 MG/DL (ref 40–60)
HDLC SERPL: 5.1 {RATIO} (ref 0–5)
HGB BLD-MCNC: 16.4 G/DL (ref 13–16)
LDLC SERPL CALC-MCNC: 84.8 MG/DL (ref 0–100)
LIPID PROFILE,FLP: ABNORMAL
MCH RBC QN AUTO: 29.2 PG (ref 24–34)
MCHC RBC AUTO-ENTMCNC: 32.3 G/DL (ref 31–37)
MCV RBC AUTO: 90.4 FL (ref 78–100)
NRBC # BLD: 0 K/UL (ref 0–0.01)
NRBC BLD-RTO: 0 PER 100 WBC
PLATELET # BLD AUTO: 287 K/UL (ref 135–420)
PMV BLD AUTO: 10.1 FL (ref 9.2–11.8)
POTASSIUM SERPL-SCNC: 4.6 MMOL/L (ref 3.5–5.5)
PROT SERPL-MCNC: 7.5 G/DL (ref 6.4–8.2)
RBC # BLD AUTO: 5.62 M/UL (ref 4.35–5.65)
SODIUM SERPL-SCNC: 138 MMOL/L (ref 136–145)
TRIGL SERPL-MCNC: 226 MG/DL (ref ?–150)
VLDLC SERPL CALC-MCNC: 45.2 MG/DL
WBC # BLD AUTO: 10 K/UL (ref 4.6–13.2)

## 2022-10-24 PROCEDURE — 36415 COLL VENOUS BLD VENIPUNCTURE: CPT

## 2022-10-24 PROCEDURE — 80053 COMPREHEN METABOLIC PANEL: CPT

## 2022-10-24 PROCEDURE — 85027 COMPLETE CBC AUTOMATED: CPT

## 2022-10-24 PROCEDURE — 80061 LIPID PANEL: CPT

## 2022-10-24 NOTE — TELEPHONE ENCOUNTER
Patient is calling stating he spoke with his insurance company and was told they will no longer pay for Nexium. Needs to be prescribed something else.

## 2022-10-26 RX ORDER — OMEPRAZOLE 40 MG/1
40 CAPSULE, DELAYED RELEASE ORAL DAILY
Qty: 90 CAPSULE | Refills: 3 | Status: SHIPPED | OUTPATIENT
Start: 2022-10-26

## 2022-10-31 ENCOUNTER — OFFICE VISIT (OUTPATIENT)
Dept: INTERNAL MEDICINE CLINIC | Age: 67
End: 2022-10-31
Payer: MEDICARE

## 2022-10-31 VITALS
BODY MASS INDEX: 24.78 KG/M2 | HEART RATE: 85 BPM | OXYGEN SATURATION: 98 % | TEMPERATURE: 97.3 F | WEIGHT: 177 LBS | DIASTOLIC BLOOD PRESSURE: 63 MMHG | SYSTOLIC BLOOD PRESSURE: 127 MMHG | HEIGHT: 71 IN | RESPIRATION RATE: 16 BRPM

## 2022-10-31 DIAGNOSIS — E11.40 TYPE 2 DIABETES, CONTROLLED, WITH NEUROPATHY (HCC): ICD-10-CM

## 2022-10-31 DIAGNOSIS — D12.6 COLON ADENOMA: ICD-10-CM

## 2022-10-31 DIAGNOSIS — I10 PRIMARY HYPERTENSION: ICD-10-CM

## 2022-10-31 DIAGNOSIS — Z94.0 S/P KIDNEY TRANSPLANT: ICD-10-CM

## 2022-10-31 DIAGNOSIS — Z00.00 MEDICARE ANNUAL WELLNESS VISIT, SUBSEQUENT: Primary | ICD-10-CM

## 2022-10-31 DIAGNOSIS — N52.9 ERECTILE DYSFUNCTION, UNSPECIFIED ERECTILE DYSFUNCTION TYPE: ICD-10-CM

## 2022-10-31 DIAGNOSIS — E11.21 TYPE 2 DIABETES WITH NEPHROPATHY (HCC): ICD-10-CM

## 2022-10-31 DIAGNOSIS — Z71.89 ADVANCED DIRECTIVES, COUNSELING/DISCUSSION: ICD-10-CM

## 2022-10-31 DIAGNOSIS — E78.5 DYSLIPIDEMIA: ICD-10-CM

## 2022-10-31 PROCEDURE — G8536 NO DOC ELDER MAL SCRN: HCPCS | Performed by: INTERNAL MEDICINE

## 2022-10-31 PROCEDURE — 99497 ADVNCD CARE PLAN 30 MIN: CPT | Performed by: INTERNAL MEDICINE

## 2022-10-31 PROCEDURE — 99214 OFFICE O/P EST MOD 30 MIN: CPT | Performed by: INTERNAL MEDICINE

## 2022-10-31 PROCEDURE — 1101F PT FALLS ASSESS-DOCD LE1/YR: CPT | Performed by: INTERNAL MEDICINE

## 2022-10-31 PROCEDURE — G0439 PPPS, SUBSEQ VISIT: HCPCS | Performed by: INTERNAL MEDICINE

## 2022-10-31 PROCEDURE — G0463 HOSPITAL OUTPT CLINIC VISIT: HCPCS | Performed by: INTERNAL MEDICINE

## 2022-10-31 PROCEDURE — G8420 CALC BMI NORM PARAMETERS: HCPCS | Performed by: INTERNAL MEDICINE

## 2022-10-31 PROCEDURE — 3078F DIAST BP <80 MM HG: CPT | Performed by: INTERNAL MEDICINE

## 2022-10-31 PROCEDURE — G8510 SCR DEP NEG, NO PLAN REQD: HCPCS | Performed by: INTERNAL MEDICINE

## 2022-10-31 PROCEDURE — 3046F HEMOGLOBIN A1C LEVEL >9.0%: CPT | Performed by: INTERNAL MEDICINE

## 2022-10-31 PROCEDURE — 3074F SYST BP LT 130 MM HG: CPT | Performed by: INTERNAL MEDICINE

## 2022-10-31 PROCEDURE — G9231 DOC ESRD DIA TRANS PREG: HCPCS | Performed by: INTERNAL MEDICINE

## 2022-10-31 PROCEDURE — 3017F COLORECTAL CA SCREEN DOC REV: CPT | Performed by: INTERNAL MEDICINE

## 2022-10-31 PROCEDURE — 1123F ACP DISCUSS/DSCN MKR DOCD: CPT | Performed by: INTERNAL MEDICINE

## 2022-10-31 PROCEDURE — 2022F DILAT RTA XM EVC RTNOPTHY: CPT | Performed by: INTERNAL MEDICINE

## 2022-10-31 PROCEDURE — G8427 DOCREV CUR MEDS BY ELIG CLIN: HCPCS | Performed by: INTERNAL MEDICINE

## 2022-10-31 RX ORDER — BRIMONIDINE TARTRATE 1 MG/ML
SOLUTION/ DROPS OPHTHALMIC
COMMUNITY
Start: 2022-09-22

## 2022-10-31 RX ORDER — TADALAFIL 20 MG/1
20 TABLET ORAL
Qty: 10 TABLET | Refills: 5 | Status: SHIPPED | OUTPATIENT
Start: 2022-10-31

## 2022-10-31 RX ORDER — EZETIMIBE 10 MG/1
10 TABLET ORAL DAILY
Qty: 30 TABLET | Refills: 11 | Status: SHIPPED | OUTPATIENT
Start: 2022-10-31

## 2022-10-31 NOTE — PROGRESS NOTES
Alejandra Aaron presents today for   Chief Complaint   Patient presents with    Annual Wellness Visit    Labs     10-24-22     1. \"Have you been to the ER, urgent care clinic since your last visit? Hospitalized since your last visit? \" no    2. \"Have you seen or consulted any other health care providers outside of the [address] [place] [address] since your last visit? \" yes     3. For patients aged 39-70: Has the patient had a colonoscopy / FIT/ Cologuard? Yes - no Care Gap present      If the patient is female:    4. For patients aged 41-77: Has the patient had a mammogram within the past 2 years? NA - based on age or sex  See top three    5. For patients aged 21-65: Has the patient had a pap smear?  NA - based on age or sex

## 2022-10-31 NOTE — PATIENT INSTRUCTIONS
Medicare Wellness Visit, Male    The best way to live healthy is to have a lifestyle where you eat a well-balanced diet, exercise regularly, limit alcohol use, and quit all forms of tobacco/nicotine, if applicable. Regular preventive services are another way to keep healthy. Preventive services (vaccines, screening tests, monitoring & exams) can help personalize your care plan, which helps you manage your own care. Screening tests can find health problems at the earliest stages, when they are easiest to treat. [facility] follows the current, evidence-based guidelines published by the [facility] ([facility]) when recommending preventive services for our patients. Because we follow these guidelines, sometimes recommendations change over time as research supports it. (For example, a prostate screening blood test is no longer routinely recommended for men with no symptoms). Of course, you and your doctor may decide to screen more often for some diseases, based on your risk and co-morbidities (chronic disease you are already diagnosed with). Preventive services for you include:  - Medicare offers their members a free annual wellness visit, which is time for you and your primary care provider to discuss and plan for your preventive service needs. Take advantage of this benefit every year!  -All adults over age 72 should receive the recommended pneumonia vaccines. Current USPSTF guidelines recommend a series of two vaccines for the best pneumonia protection.   -All adults should have a flu vaccine yearly and tetanus vaccine every 10 years.  -All adults age 48 and older should receive the shingles vaccines (series of two vaccines).        -All adults age 38-68 who are overweight should have a diabetes screening test once every three years.   -Other screening tests & preventive services for persons with diabetes include: an eye exam to screen for diabetic retinopathy, a kidney function test, a foot exam, and stricter control over your cholesterol.   -Cardiovascular screening for adults with routine risk involves an electrocardiogram (ECG) at intervals determined by the provider.   -Colorectal cancer screening should be done for adults age 54-65 with no increased risk factors for colorectal cancer. There are a number of acceptable methods of screening for this type of cancer. Each test has its own benefits and drawbacks. Discuss with your provider what is most appropriate for you during your annual wellness visit. The different tests include: colonoscopy (considered the best screening method), a fecal occult blood test, a fecal DNA test, and sigmoidoscopy.  -All adults born between [facility] should be screened once for Hepatitis C.  -An Abdominal Aortic Aneurysm (AAA) Screening is recommended for men age 73-68 who has ever smoked in their lifetime.      Here is a list of your current Health Maintenance items (your personalized list of preventive services) with a due date:  Health Maintenance Due   Topic Date Due    COVID-19 Vaccine (3 - Moderna risk series) 04/05/2021    Yearly Flu Vaccine (1) 08/01/2022    Hemoglobin A1C    08/06/2022    Depresssion Screening  09/29/2022

## 2022-10-31 NOTE — ACP (ADVANCE CARE PLANNING)
Advance Care Planning     Advance Care Planning (ACP) Physician/NP/PA Conversation      Date of Conversation: 10/31/2022  Conducted with: Patient with Decision Making Capacity    Healthcare Decision Maker:     Primary Decision Maker: Octavio Mercado - Spouse - [phone]  Click here to complete [address] including selection of the Healthcare Decision Maker Relationship (ie \"Primary\")      Today we documented Decision Maker(s) consistent with Legal Next of Kin hierarchy. Care Preferences:    Hospitalization: \"If your health worsens and it becomes clear that your chance of recovery is unlikely, what would be your preference regarding hospitalization? \"  The patient would prefer hospitalization. Ventilation: \"If you were unable to breathe on your own and your chance of recovery was unlikely, what would be your preference about the use of a ventilator (breathing machine) if it was available to you? \"   The patient would desire the use of a ventilator. Resuscitation: \"In the event your heart stopped as a result of an underlying serious health condition, would you want attempts to be made to restart your heart, or would you prefer a natural death? \"   Yes, attempt to resuscitate.     Additional topics discussed: ventilation preferences, hospitalization preferences, and resuscitation preferences    Conversation Outcomes / Follow-Up Plan:   ACP in process - information provided, considering goals and options  Reviewed DNR/DNI and patient elects Full Code (Attempt Resuscitation)     Length of Voluntary ACP Conversation in minutes:  16 minutes    Melida Clements MD

## 2023-03-03 ENCOUNTER — TELEPHONE (OUTPATIENT)
Age: 68
End: 2023-03-03

## 2023-03-03 DIAGNOSIS — E78.5 DYSLIPIDEMIA: ICD-10-CM

## 2023-03-03 DIAGNOSIS — E11.40 TYPE 2 DIABETES, CONTROLLED, WITH NEUROPATHY (HCC): Primary | ICD-10-CM

## 2023-03-10 ENCOUNTER — HOSPITAL ENCOUNTER (OUTPATIENT)
Facility: HOSPITAL | Age: 68
Setting detail: SPECIMEN
End: 2023-03-10
Payer: MEDICARE

## 2023-03-10 DIAGNOSIS — E11.40 TYPE 2 DIABETES, CONTROLLED, WITH NEUROPATHY (HCC): ICD-10-CM

## 2023-03-10 DIAGNOSIS — E78.5 DYSLIPIDEMIA: ICD-10-CM

## 2023-03-10 LAB
ALBUMIN SERPL-MCNC: 3.5 G/DL (ref 3.4–5)
ALBUMIN/GLOB SERPL: 0.9 (ref 0.8–1.7)
ALP SERPL-CCNC: 119 U/L (ref 45–117)
ALT SERPL-CCNC: 62 U/L (ref 16–61)
ANION GAP SERPL CALC-SCNC: 8 MMOL/L (ref 3–18)
AST SERPL-CCNC: 29 U/L (ref 10–38)
BILIRUB SERPL-MCNC: 0.4 MG/DL (ref 0.2–1)
BUN SERPL-MCNC: 18 MG/DL (ref 7–18)
BUN/CREAT SERPL: 14 (ref 12–20)
CALCIUM SERPL-MCNC: 9.7 MG/DL (ref 8.5–10.1)
CHLORIDE SERPL-SCNC: 102 MMOL/L (ref 100–111)
CHOLEST SERPL-MCNC: 95 MG/DL
CO2 SERPL-SCNC: 27 MMOL/L (ref 21–32)
CREAT SERPL-MCNC: 1.26 MG/DL (ref 0.6–1.3)
GLOBULIN SER CALC-MCNC: 3.8 G/DL (ref 2–4)
GLUCOSE SERPL-MCNC: 84 MG/DL (ref 74–99)
HBA1C MFR BLD: 8.2 % (ref 4.2–5.6)
HDLC SERPL-MCNC: 31 MG/DL (ref 40–60)
HDLC SERPL: 3.1 (ref 0–5)
LDLC SERPL CALC-MCNC: 30.4 MG/DL (ref 0–100)
LIPID PANEL: ABNORMAL
POTASSIUM SERPL-SCNC: 4.3 MMOL/L (ref 3.5–5.5)
PROT SERPL-MCNC: 7.3 G/DL (ref 6.4–8.2)
SODIUM SERPL-SCNC: 137 MMOL/L (ref 136–145)
TRIGL SERPL-MCNC: 168 MG/DL
VLDLC SERPL CALC-MCNC: 33.6 MG/DL

## 2023-03-10 PROCEDURE — 80053 COMPREHEN METABOLIC PANEL: CPT

## 2023-03-10 PROCEDURE — 80061 LIPID PANEL: CPT

## 2023-03-10 PROCEDURE — 83036 HEMOGLOBIN GLYCOSYLATED A1C: CPT

## 2023-03-10 PROCEDURE — 36415 COLL VENOUS BLD VENIPUNCTURE: CPT

## 2023-03-16 NOTE — PROGRESS NOTES
Fransisco Mckeon presents today for   Chief Complaint   Patient presents with    Follow-up    Discuss Labs     3-10-23    Diabetes     Type 2 with diabetic neuropathy     Hypertension    Hyperlipidemia     1. \"Have you been to the ER, urgent care clinic since your last visit? Hospitalized since your last visit? \" no    2. \"Have you seen or consulted any other health care providers outside of the [address] [place] [address] since your last visit? \" no     3. For patients aged 39-70: Has the patient had a colonoscopy / FIT/ Cologuard? No      If the patient is female:    4. For patients aged 41-77: Has the patient had a mammogram within the past 2 years? NA - based on age or sex      11. For patients aged 21-65: Has the patient had a pap smear?  NA - based on age or sex

## 2023-03-17 ENCOUNTER — OFFICE VISIT (OUTPATIENT)
Age: 68
End: 2023-03-17
Payer: MEDICARE

## 2023-03-17 VITALS
TEMPERATURE: 97.5 F | OXYGEN SATURATION: 97 % | DIASTOLIC BLOOD PRESSURE: 73 MMHG | WEIGHT: 184 LBS | BODY MASS INDEX: 25.76 KG/M2 | HEART RATE: 77 BPM | HEIGHT: 71 IN | RESPIRATION RATE: 18 BRPM | SYSTOLIC BLOOD PRESSURE: 140 MMHG

## 2023-03-17 DIAGNOSIS — I10 PRIMARY HYPERTENSION: ICD-10-CM

## 2023-03-17 DIAGNOSIS — D12.6 COLON ADENOMA: ICD-10-CM

## 2023-03-17 DIAGNOSIS — G47.30 SLEEP APNEA, UNSPECIFIED TYPE: ICD-10-CM

## 2023-03-17 DIAGNOSIS — E78.5 DYSLIPIDEMIA: ICD-10-CM

## 2023-03-17 DIAGNOSIS — E11.21 TYPE 2 DIABETES WITH NEPHROPATHY (HCC): Primary | ICD-10-CM

## 2023-03-17 DIAGNOSIS — Z94.0 S/P KIDNEY TRANSPLANT: ICD-10-CM

## 2023-03-17 PROCEDURE — 3074F SYST BP LT 130 MM HG: CPT | Performed by: INTERNAL MEDICINE

## 2023-03-17 PROCEDURE — 2022F DILAT RTA XM EVC RTNOPTHY: CPT | Performed by: INTERNAL MEDICINE

## 2023-03-17 PROCEDURE — 1036F TOBACCO NON-USER: CPT | Performed by: INTERNAL MEDICINE

## 2023-03-17 PROCEDURE — G8484 FLU IMMUNIZE NO ADMIN: HCPCS | Performed by: INTERNAL MEDICINE

## 2023-03-17 PROCEDURE — 3052F HG A1C>EQUAL 8.0%<EQUAL 9.0%: CPT | Performed by: INTERNAL MEDICINE

## 2023-03-17 PROCEDURE — 3078F DIAST BP <80 MM HG: CPT | Performed by: INTERNAL MEDICINE

## 2023-03-17 PROCEDURE — G8417 CALC BMI ABV UP PARAM F/U: HCPCS | Performed by: INTERNAL MEDICINE

## 2023-03-17 PROCEDURE — 99214 OFFICE O/P EST MOD 30 MIN: CPT | Performed by: INTERNAL MEDICINE

## 2023-03-17 PROCEDURE — G8427 DOCREV CUR MEDS BY ELIG CLIN: HCPCS | Performed by: INTERNAL MEDICINE

## 2023-03-17 PROCEDURE — 3017F COLORECTAL CA SCREEN DOC REV: CPT | Performed by: INTERNAL MEDICINE

## 2023-03-17 PROCEDURE — 1123F ACP DISCUSS/DSCN MKR DOCD: CPT | Performed by: INTERNAL MEDICINE

## 2023-03-17 ASSESSMENT — PATIENT HEALTH QUESTIONNAIRE - PHQ9
SUM OF ALL RESPONSES TO PHQ QUESTIONS 1-9: 0
2. FEELING DOWN, DEPRESSED OR HOPELESS: 0
SUM OF ALL RESPONSES TO PHQ9 QUESTIONS 1 & 2: 0
SUM OF ALL RESPONSES TO PHQ QUESTIONS 1-9: 0
1. LITTLE INTEREST OR PLEASURE IN DOING THINGS: 0

## 2023-03-18 NOTE — PROGRESS NOTES
69 y.o. male who presents for evaluation. He continues to see Dr lOiva Espinoza and also Dr Eva Haskins at the transplant clinic and reports no new recs    No cardiovascular complaints. Not set exercise outside of adls, more motivational than anything    Denies any GI or  complaints. Denies polyuria, polydipsia, nocturia, vision change. Weight is stable  He is being managed by Dr Danuta Field  and currently on tresiba 30u. He checks fasting readings and generally around 100, not checking pps. He only eats breakfast for his sole meal, smaller snacks late in afternoon or evening, he has no prandial coverage.   Last a1c was 7.1 about 3 mos ago    We had added zetia at the last visit and no issues    LAST MEDICARE WELLNESS EXAM: 9/18/14, 9/24/15, 12/20/16, 3/23/18, 2/15/19, 2/21/20, 9/29/21, 10/31/22    Past Medical History:   Diagnosis Date    Acute hearing loss of right ear     Acute urinary retention 08/2015    Anemia of chronic disease 2010    saw Dr. Geovani Mcgarry in past    Asbestosis(501)     CHF (congestive heart failure) ([facility] 2011    Chronic edema     Colon adenoma     Dr. Almita Hope 2009, 2/15, 4/18    DM (diabetes mellitus) ([facility]     w neuropathy and retinopathy Dr Blake Newman; now seing Dr Danuta Field; EMG 2/22 Dr Christian Logan    Dyslipidemia     ED (erectile dysfunction)     ESRD on hemodialysis [facility])     Dr. Donte Holcomb, M-W-F; s/p cadaveric renal transplant 2019 [facility]    FHx: heart disease     GERD (gastroesophageal reflux disease)     Glaucoma     Dr. Jenna Hope    H/O cardiac catheterization 03/28/2017    Dr Maritza Lucio; diffuse luminal irregularities without stenosis Cv, LAD, 20-30% mid RCA    H/O cardiovascular stress test     NST neg 2009    Hyperlipidemia     Hypertension     Nephrolithiasis     CHON (obstructive sleep apnea)     NOT using cpap (states resolved)    Osteoarthritis     Polycystic kidney 10/21/2019    Right foot drop 2019    S/P kidney transplant 08/2019    [facility]    Sarcoidosis     Vitamin D deficiency      Past Surgical History:   Procedure Laterality Date    CATARACT REMOVAL      COLONOSCOPY N/A 04/10/2018    Dr. Alejandro Trevino adenoma (), (2/15); adenoma and hyperplastic (4/10/18)    CYSTOSCOPY  2015    neg Dr Rosemary Pena DX [id number] [place] S&I N/A 2018    Fistulogram Left performed by Jessie Comer MD at [facility] CATH LAB    KIDNEY TRANSPLANT  2019     donor Dr. Mckenzie Height  08/10/2019    exploratinon of kidney transplant w/ evac of subcutaneous hematoma, core biopsy of transplanted kidney, irrigation and evac of bladder clots Dr. Jessica Patterson HD    VASCULAR SURGERY  2014    nl MONET bilat    WRIST FRACTURE SURGERY Left 2018    wrist and humerus Dr Wellington Moyer History    Marital status:      Spouse name: Not on file    Number of children: Not on file    Years of education: Not on file    Highest education level: Not on file   Occupational History    Not on file   Tobacco Use    Smoking status: Never    Smokeless tobacco: Never   Substance and Sexual Activity    Alcohol use: No    Drug use: No    Sexual activity: Not on file   Other Topics Concern    Not on file   Social History Narrative    Not on file     Social Determinants of Health     Financial Resource Strain: Not on file   Food Insecurity: Not on file   Transportation Needs: Not on file   Physical Activity: Not on file   Stress: Not on file   Social Connections: Not on file   Intimate Partner Violence: Not on file   Housing Stability: Not on file     Current Outpatient Medications   Medication Sig    Insulin Degludec (TRESIBA SC) Inject 30 Units into the skin at bedtime    atorvastatin (LIPITOR) 80 MG tablet TAKE ONE TABLET BY MOUTH DAILY    bimatoprost (LUMIGAN) 0.01 % SOLN ophthalmic drops 1 drop nightly    brimonidine (ALPHAGAN P) 0.1 % SOLN [facility]: Outside name: Alphagan P 0.1 % ophthalmic solution    brimonidine-timolol (COMBIGAN) 0.2-0.5 % ophthalmic solution Apply 1 drop to eye in the morning and 1 drop in the evening. cinacalcet (SENSIPAR) 60 MG tablet Take 60 mg by mouth    dapsone 100 MG tablet Take 100 mg by mouth daily    difluprednate (DUREZOL) 0.05 % EMUL Apply to eye daily    ezetimibe (ZETIA) 10 MG tablet Take 10 mg by mouth daily    phosphorus (K PHOS NEUTRAL) 155-852-130 MG tablet Take 1 tablet by mouth 3 times daily    magnesium oxide (MAG-OX) 400 MG tablet Take 400 mg by mouth daily    mycophenolate (MYFORTIC) 180 MG DR tablet Take 180 mg by mouth 2 times daily    NIFEdipine (PROCARDIA XL) 90 MG extended release tablet Take 90 mg by mouth daily    omeprazole (PRILOSEC) 40 MG delayed release capsule Take 40 mg by mouth daily    predniSONE (DELTASONE) 5 MG tablet Take 10 mg by mouth daily    tacrolimus (PROGRAF) 1 MG capsule Take by mouth every 12 hours    tadalafil (CIALIS) 20 MG tablet Take 20 mg by mouth daily as needed     No current facility-administered medications for this visit. No Known Allergies    REVIEW OF SYSTEMS: colo Dr Nina Call 4/18, sees Dr Sudheer Singh, Dr Nora Ricks in past  Ophtho - no vision change or eye pain  Oral - no mouth pain, tongue or tooth problems  Ears - no hearing loss, ear pain, fullness, no swallowing problems  Cardiac - no CP, PND, orthopnea, edema, palpitations or syncope  Chest - no breast masses  Resp - no wheezing, chronic coughing, dyspnea  GI - no heartburn, nausea, vomiting, change in bowel habits, bleeding, hemorrhoids  Urinary - no dysuria, hematuria, flank pain, urgency, frequency    Vitals:    03/17/23 1348   BP: (!) 140/73   Pulse: 77   Resp: 18   Temp:    SpO2: 97%     A&O x3  Affect is appropriate. Mood stable  No apparent distress  Anicteric, no JVD, adenopathy or thyromegaly.    No carotid bruits or radiated murmur  Lungs clear to auscultation, no wheezes or rales   Heart --Regular rate and rhythm, 2/6 VALENTINA lsb without radiation, no rubs, gallops, or clicks. Abdomen -- Soft and nontender, no hepatosplenomegaly or masses. Extremities -- Without cyanosis, clubbing. 2+ pulses equally and bilaterally.   No edema noted, LUE AV fistula w thrill and bruit  Pulses 1-2+ DP and PT    LABS  From 2/11 showed   gluc 131, cr 4.5,             hba1c 6.1,                   chol 187, tg 154, hdl 25, ldl-c 131, wbc 9.8,  hb 8.7,   plt 374, psa 1.00  From 11/11 showed gluc 114,              hba1c 5.6,             chol 157, tg 157, hdl 36, ldl-c 90  From 5/12 showed     alt 13, hba1c 6.1, ldl-p 1661, chol 184, tg 192, hdl 27, ldl-c 119  From 11/12 showed gluc 130, cr 6.0, gfr 11, alt 9,   hba1c 5.4, ldl-p 884,            wbc 8.0,  hb 10.3  plt 324  From 8/13 showed   gluc 144, cr 8.3,  alt<5,  hba1c 5.7,             chol 169, tg 327, hdl 20, ldl-c 84   From 3/14 showed   gluc 125, cr 6.9,  alt<5,  hba1c 5.8,             chol 141, tg 233, hdl 24, ldl-c 70,  wbc 5.7,   hb 10.4, plt 239  From 9/14 showed                hba1c 5.7,                        psa 1.60, vit d 32.8  From 8/15 showed   gluc 151,   alt 14,                wbc 11.9, hb 11.5, plt 189, lip 283  From 9/15 showed   gluc 110, cr 6.51, gfr 9, alt<5,  hba1c 5.6,             chol 182, tg 219, hdl 26, ldl-c 112, wbc 4.5,   hb 11.7, plt 245  From 2/16 showed                hba1c 5.6,             chol 183, tg 169, hdl 34, ldl-c 115  Form 6/16 showed   gluc 124, cr 8.22,             hba1c 6.0,             chol 140, tg 184, hdl 30, ldl-c 73  From 12/16 showed               alt 15, hba1c 4.8,             chol 138, tg 138, hdl 33, ldl-c 77  From 3/18 showed   gluc 103,   alt 13, hba1c 4.6,                   chol 150, tg 108, hdl 35, ldl-c 93  From 2/19 showed   gluc 91,    alt 18, hba1c 5.5,             chol 216, tg 169, hdl 33, ldl-c 149, wbc 4.9, hb 12.9, plt 265  From 6/20 showed               hba1c 4.8,         ldl-c 62  From 11/20 showed               hba1c 8.4  From 3/21 showed hba1c 8.1,             chol 167, tg 231, hdl 29, ldl-c 92  From 8/21 showed   gluc 109, cr 1.40,          alt 65, hba1c 7.7,             chol 161, tg 260, hdl 29, ldl-c 81,  wbc 11.4, hb 17.2, plt 253  From 10/22 showed gluc 83,   cr 1.41,  alt 57,               chol 162, tg 226, hdl 32, ldl-c 85,  wbc 10.0, hb 16.4, plt 227  From 12/22 showed                hba1c 7.1    Results for orders placed or performed during the hospital encounter of 03/10/23 (from the past 2160 hour(s))   Comprehensive Metabolic Panel   Result Value Ref Range    Sodium 137 136 - 145 mmol/L    Potassium 4.3 3.5 - 5.5 mmol/L    Chloride 102 100 - 111 mmol/L    CO2 27 21 - 32 mmol/L    Anion Gap 8 3.0 - 18 mmol/L    Glucose 84 74 - 99 mg/dL    BUN 18 7.0 - 18 MG/DL    Creatinine 1.26 0.6 - 1.3 MG/DL    Bun/Cre Ratio 14 12 - 20      Est, Glom Filt Rate >60 >60 ml/min/1.73m2    Calcium 9.7 8.5 - 10.1 MG/DL    Total Bilirubin 0.4 0.2 - 1.0 MG/DL    ALT 62 (H) 16 - 61 U/L    AST 29 10 - 38 U/L    Alk Phosphatase 119 (H) 45 - 117 U/L    Total Protein 7.3 6.4 - 8.2 g/dL    Albumin 3.5 3.4 - 5.0 g/dL    Globulin 3.8 2.0 - 4.0 g/dL    Albumin/Globulin Ratio 0.9 0.8 - 1.7     HEMOGLOBIN A1C W/O EAG   Result Value Ref Range    Hemoglobin A1C 8.2 (H) 4.2 - 5.6 %   Lipid Panel   Result Value Ref Range    LIPID PANEL          Cholesterol, Total 95 <200 MG/DL    Triglycerides 168 (H) <150 MG/DL    HDL 31 (L) 40 - 60 MG/DL    LDL Calculated 30.4 0 - 100 MG/DL    VLDL Cholesterol Calculated 33.6 MG/DL    Chol/HDL Ratio 3.1 0 - 5.0           We reviewed the patient's labs from the last several visits to point out trends in the numbers      Patient Active Problem List   Diagnosis    Type 2 diabetes, controlled, with neuropathy (HCC)    Colon adenoma    Diabetic retinopathy ([facility]    GERD without esophagitis    Sarcoidosis    Sleep apnea    Advance directive discussed with patient    Dyslipidemia    Arthritis, degenerative    Primary hypertension    Type 2 diabetes with nephropathy (HCC)    Erectile dysfunction    S/P kidney transplant     ASSESSMENT AND PLAN:  DM.  We discussed prandial insulin in general, he will start checking 2 hr pps readings ad report those to Dr Stephanie Dewey, f/u Dr. Jordan Cervantes and Dr Ese Ngo  Hypertension. Continue current   Hyperlipidemia. Doing well after adding zetia  S/P renal transplant. F/u transplant clinic and Dr Sandi Salgado. Fiber, he was referred to gi last visit for 5yr follow up   Ortho. F/U Dr Hoff Fraction  Sleep apnea. Off cpap. F/U Dr. Trinh Lee as needed  Ophtho. F/U Dr. Viral Stone  GERD. Daily ppi, avoidance measures  10. ED. Prn valentina               RTC 9/23    Above conditions discussed at length and patient vocalized understanding. All questions answered to patient satisfaction     Diagnosis Orders   1. Type 2 diabetes with nephropathy (HCC)  Comprehensive Metabolic Panel    HEMOGLOBIN A1C W/O EAG      2. Primary hypertension        3. Sleep apnea, unspecified type        4. Colon adenoma        5. Dyslipidemia        6.  S/P kidney transplant

## 2023-06-21 ENCOUNTER — ANESTHESIA EVENT (OUTPATIENT)
Facility: HOSPITAL | Age: 68
End: 2023-06-21
Payer: MEDICARE

## 2023-06-22 ENCOUNTER — ANESTHESIA (OUTPATIENT)
Facility: HOSPITAL | Age: 68
End: 2023-06-22
Payer: MEDICARE

## 2023-06-22 ENCOUNTER — HOSPITAL ENCOUNTER (OUTPATIENT)
Facility: HOSPITAL | Age: 68
Setting detail: OUTPATIENT SURGERY
Discharge: HOME | End: 2023-06-22
Attending: INTERNAL MEDICINE | Admitting: INTERNAL MEDICINE
Payer: MEDICARE

## 2023-06-22 VITALS
OXYGEN SATURATION: 99 % | SYSTOLIC BLOOD PRESSURE: 116 MMHG | HEART RATE: 82 BPM | BODY MASS INDEX: 25.2 KG/M2 | WEIGHT: 180 LBS | HEIGHT: 71 IN | RESPIRATION RATE: 18 BRPM | TEMPERATURE: 97.9 F | DIASTOLIC BLOOD PRESSURE: 58 MMHG

## 2023-06-22 LAB
ANION GAP SERPL CALC-SCNC: 6 MMOL/L (ref 3–18)
BUN SERPL-MCNC: 24 MG/DL (ref 7–18)
BUN/CREAT SERPL: 18 (ref 12–20)
CALCIUM SERPL-MCNC: 10.7 MG/DL (ref 8.5–10.1)
CHLORIDE SERPL-SCNC: 101 MMOL/L (ref 100–111)
CO2 SERPL-SCNC: 27 MMOL/L (ref 21–32)
CREAT SERPL-MCNC: 1.37 MG/DL (ref 0.6–1.3)
GLUCOSE BLD STRIP.AUTO-MCNC: 65 MG/DL (ref 70–110)
GLUCOSE BLD STRIP.AUTO-MCNC: 77 MG/DL (ref 70–110)
GLUCOSE BLD STRIP.AUTO-MCNC: 82 MG/DL (ref 70–110)
GLUCOSE BLD STRIP.AUTO-MCNC: 83 MG/DL (ref 70–110)
GLUCOSE SERPL-MCNC: 81 MG/DL (ref 74–99)
POTASSIUM SERPL-SCNC: 4.1 MMOL/L (ref 3.5–5.5)
SODIUM SERPL-SCNC: 134 MMOL/L (ref 136–145)

## 2023-06-22 PROCEDURE — 2500000003 HC RX 250 WO HCPCS: Performed by: NURSE ANESTHETIST, CERTIFIED REGISTERED

## 2023-06-22 PROCEDURE — 80048 BASIC METABOLIC PNL TOTAL CA: CPT

## 2023-06-22 PROCEDURE — 3600007502: Performed by: INTERNAL MEDICINE

## 2023-06-22 PROCEDURE — 3700000001 HC ADD 15 MINUTES (ANESTHESIA): Performed by: INTERNAL MEDICINE

## 2023-06-22 PROCEDURE — 82962 GLUCOSE BLOOD TEST: CPT

## 2023-06-22 PROCEDURE — 2709999900 HC NON-CHARGEABLE SUPPLY: Performed by: INTERNAL MEDICINE

## 2023-06-22 PROCEDURE — 3600007512: Performed by: INTERNAL MEDICINE

## 2023-06-22 PROCEDURE — 3700000000 HC ANESTHESIA ATTENDED CARE: Performed by: INTERNAL MEDICINE

## 2023-06-22 PROCEDURE — 7100000011 HC PHASE II RECOVERY - ADDTL 15 MIN: Performed by: INTERNAL MEDICINE

## 2023-06-22 PROCEDURE — 7100000000 HC PACU RECOVERY - FIRST 15 MIN: Performed by: INTERNAL MEDICINE

## 2023-06-22 PROCEDURE — 7100000010 HC PHASE II RECOVERY - FIRST 15 MIN: Performed by: INTERNAL MEDICINE

## 2023-06-22 PROCEDURE — 6360000002 HC RX W HCPCS: Performed by: NURSE ANESTHETIST, CERTIFIED REGISTERED

## 2023-06-22 PROCEDURE — 6370000000 HC RX 637 (ALT 250 FOR IP)

## 2023-06-22 PROCEDURE — 2580000003 HC RX 258: Performed by: NURSE ANESTHETIST, CERTIFIED REGISTERED

## 2023-06-22 RX ORDER — FENTANYL CITRATE 50 UG/ML
25 INJECTION, SOLUTION INTRAMUSCULAR; INTRAVENOUS EVERY 5 MIN PRN
Status: CANCELLED | OUTPATIENT
Start: 2023-06-22

## 2023-06-22 RX ORDER — INSULIN LISPRO 100 [IU]/ML
1-15 INJECTION, SOLUTION INTRAVENOUS; SUBCUTANEOUS ONCE
Status: DISCONTINUED | OUTPATIENT
Start: 2023-06-22 | End: 2023-06-22 | Stop reason: HOSPADM

## 2023-06-22 RX ORDER — SODIUM CHLORIDE 9 MG/ML
INJECTION, SOLUTION INTRAVENOUS CONTINUOUS
Status: DISCONTINUED | OUTPATIENT
Start: 2023-06-22 | End: 2023-06-22 | Stop reason: HOSPADM

## 2023-06-22 RX ORDER — FAMOTIDINE 20 MG/1
20 TABLET, FILM COATED ORAL ONCE
Status: COMPLETED | OUTPATIENT
Start: 2023-06-22 | End: 2023-06-22

## 2023-06-22 RX ORDER — LIDOCAINE HYDROCHLORIDE 10 MG/ML
1 INJECTION, SOLUTION EPIDURAL; INFILTRATION; INTRACAUDAL; PERINEURAL
Status: DISCONTINUED | OUTPATIENT
Start: 2023-06-22 | End: 2023-06-22 | Stop reason: HOSPADM

## 2023-06-22 RX ORDER — FAMOTIDINE 20 MG/1
TABLET, FILM COATED ORAL
Status: COMPLETED
Start: 2023-06-22 | End: 2023-06-22

## 2023-06-22 RX ORDER — SODIUM CHLORIDE 0.9 % (FLUSH) 0.9 %
5-40 SYRINGE (ML) INJECTION EVERY 12 HOURS SCHEDULED
Status: CANCELLED | OUTPATIENT
Start: 2023-06-22

## 2023-06-22 RX ORDER — DEXTROSE MONOHYDRATE 100 MG/ML
INJECTION, SOLUTION INTRAVENOUS CONTINUOUS PRN
Status: CANCELLED | OUTPATIENT
Start: 2023-06-22

## 2023-06-22 RX ORDER — SODIUM CHLORIDE 0.9 % (FLUSH) 0.9 %
5-40 SYRINGE (ML) INJECTION PRN
Status: CANCELLED | OUTPATIENT
Start: 2023-06-22

## 2023-06-22 RX ORDER — ONDANSETRON 2 MG/ML
4 INJECTION INTRAMUSCULAR; INTRAVENOUS
Status: CANCELLED | OUTPATIENT
Start: 2023-06-22 | End: 2023-06-23

## 2023-06-22 RX ORDER — PROPOFOL 10 MG/ML
INJECTION, EMULSION INTRAVENOUS PRN
Status: DISCONTINUED | OUTPATIENT
Start: 2023-06-22 | End: 2023-06-22 | Stop reason: SDUPTHER

## 2023-06-22 RX ORDER — SODIUM CHLORIDE 9 MG/ML
INJECTION, SOLUTION INTRAVENOUS PRN
Status: CANCELLED | OUTPATIENT
Start: 2023-06-22

## 2023-06-22 RX ORDER — DEXTROSE MONOHYDRATE 100 MG/ML
INJECTION, SOLUTION INTRAVENOUS CONTINUOUS PRN
Status: DISCONTINUED | OUTPATIENT
Start: 2023-06-22 | End: 2023-06-22 | Stop reason: HOSPADM

## 2023-06-22 RX ORDER — LIDOCAINE HYDROCHLORIDE 20 MG/ML
INJECTION, SOLUTION EPIDURAL; INFILTRATION; INTRACAUDAL; PERINEURAL PRN
Status: DISCONTINUED | OUTPATIENT
Start: 2023-06-22 | End: 2023-06-22 | Stop reason: SDUPTHER

## 2023-06-22 RX ADMIN — PROPOFOL 50 MG: 10 INJECTION, EMULSION INTRAVENOUS at 11:46

## 2023-06-22 RX ADMIN — SODIUM CHLORIDE: 9 INJECTION, SOLUTION INTRAVENOUS at 09:45

## 2023-06-22 RX ADMIN — PROPOFOL 100 MG: 10 INJECTION, EMULSION INTRAVENOUS at 11:36

## 2023-06-22 RX ADMIN — FAMOTIDINE 20 MG: 20 TABLET ORAL at 09:49

## 2023-06-22 RX ADMIN — FAMOTIDINE 20 MG: 20 TABLET, FILM COATED ORAL at 09:49

## 2023-06-22 RX ADMIN — LIDOCAINE HYDROCHLORIDE 60 MG: 20 INJECTION, SOLUTION EPIDURAL; INFILTRATION; INTRACAUDAL; PERINEURAL at 11:36

## 2023-06-22 NOTE — H&P
[email or web address]  [phone]    GASTROENTEROLOGY CONSULT      Impression:   1. Hx polyps      Plan:     1. Mineral mac all risks benefits and alt discussed       Chief Complaint: hx polyps      HPI:  Nicholas Lloyd is a 69 y.o. male who I am being asked to see in consultation for an opinion regarding hx polyps.     PMH:   Past Medical History:   Diagnosis Date    Acute hearing loss of right ear     Acute urinary retention 2015    Anemia of chronic disease     saw Dr. Lennox Landa in past    Asbestosis(501)     CHF (congestive heart failure) ([facility]     Chronic edema     Colon adenoma     Dr. Jordan Price , 2/15,     DM (diabetes mellitus) ([facility]     w neuropathy and retinopathy Dr Abbi Dao; now seing Dr Sandy Bloch; EMG  Dr Viviana Mays    Dyslipidemia     ED (erectile dysfunction)     ESRD on hemodialysis [facility])     Dr. Akilah Shaikh, M-W-F; s/p cadaveric renal transplant  [facility]    FHx: heart disease     GERD (gastroesophageal reflux disease)     Glaucoma     Dr. Guero Whitlock    H/O cardiac catheterization 2017    Dr Kiara Ayala; diffuse luminal irregularities without stenosis Cv, LAD, 20-30% mid RCA    H/O cardiovascular stress test     NST neg     Hyperlipidemia     Hypertension     Nephrolithiasis     CHON (obstructive sleep apnea)     no cpap    Osteoarthritis     Polycystic kidney 10/21/2019    Right foot drop 2019    S/P kidney transplant 2019    [facility]    Sarcoidosis     denies 23    Vitamin D deficiency        PSH:   Past Surgical History:   Procedure Laterality Date    CATARACT REMOVAL Bilateral     COLONOSCOPY N/A 04/10/2018    Dr. Jordan Price adenoma (), (2/15); adenoma and hyperplastic (4/10/18)    CYSTOSCOPY  2015    neg Dr Aidan Patton DX [address] [place] S&I N/A 2018    Fistulogram Left performed by Jeanenne Snellen, MD at [facility] CATH LAB    KIDNEY TRANSPLANT  2019     donor Dr. Shannan Baldwin  08/10/2019    exploratinon

## 2023-06-22 NOTE — PERIOP NOTE
Patient's blood sugar is now 65 mg/dL--Patient is still A&Ox3. Patient given 8 oz of Soda to drink. Will re-check blood sugar in 15 minutes.

## 2023-06-22 NOTE — DISCHARGE INSTRUCTIONS
Colonoscopy: What to Expect at [address] [place] [address]  After a colonoscopy, you'll stay at the clinic until you wake up. Then you can go home. But you'll need to arrange for a ride. Your doctor will tell you when you can eat and do your other usual activities. Your doctor will talk to you about when you'll need your next colonoscopy. Your doctor can help you decide how often you need to be checked. This will depend on the results of your test and your risk for colorectal cancer. After the test, you may be bloated or have gas pains. You may need to pass gas. If a biopsy was done or a polyp was removed, you may have streaks of blood in your stool (feces) for a few days. Problems such as heavy rectal bleeding may not occur until several weeks after the test. This isn't common. But it can happen after polyps are removed. This care sheet gives you a general idea about how long it will take for you to recover. But each person recovers at a different pace. Follow the steps below to get better as quickly as possible. How can you care for yourself at home? Activity    Rest when you feel tired. You can do your normal activities when it feels okay to do so. Diet    Follow your doctor's directions for eating. Unless your doctor has told you not to, drink plenty of fluids. This helps to replace the fluids that were lost during the colon prep. Do not drink alcohol. Medicines    Your doctor will tell you if and when you can restart your medicines. You will also be given instructions about taking any new medicines. If you take aspirin or some other blood thinner, ask your doctor if and when to start taking it again. Make sure that you understand exactly what your doctor wants you to do. If polyps were removed or a biopsy was done during the test, your doctor may tell you not to take aspirin or other anti-inflammatory medicines for a few days. These include ibuprofen (Advil, Motrin) and naproxen (Aleve).

## 2023-06-22 NOTE — ANESTHESIA PRE PROCEDURE
Department of Anesthesiology  Preprocedure Note       Name:  Cookie Nugent   Age:  69 y.o.  :  1955                                          MRN:  [id number]         Date:  2023      Surgeon: Rj Russ):  Alexis Urbano MD    Procedure: Procedure(s):  COLONOSCOPY    Medications prior to admission:   Prior to Admission medications    Medication Sig Start Date End Date Taking? Authorizing Provider   Insulin Degludec (TRESIBA SC) Inject 34 Units into the skin at bedtime    Historical Provider, MD   atorvastatin (LIPITOR) 80 MG tablet TAKE ONE TABLET BY MOUTH DAILY 10/11/22   Ar Automatic Reconciliation   bimatoprost (LUMIGAN) 0.01 % SOLN ophthalmic drops 1 drop nightly    Ar Automatic Reconciliation   brimonidine (ALPHAGAN P) 0.1 % SOLN ceived the following from [facility]: Outside name: Alphagan P 0.1 % ophthalmic solution 22   Ar Automatic Reconciliation   brimonidine-timolol (COMBIGAN) 0.2-0.5 % ophthalmic solution Apply 1 drop to eye in the morning and 1 drop in the evening.     Ar Automatic Reconciliation   cinacalcet (SENSIPAR) 60 MG tablet Take 60 mg by mouth    Ar Automatic Reconciliation   dapsone 100 MG tablet Take by mouth 2 times daily Takes 1/2 tab BID    Ar Automatic Reconciliation   difluprednate (DUREZOL) 0.05 % EMUL Apply to eye daily 19   Ar Automatic Reconciliation   ezetimibe (ZETIA) 10 MG tablet Take 10 mg by mouth daily 10/31/22   Ar Automatic Reconciliation   phosphorus (K PHOS NEUTRAL) 155-852-130 MG tablet Take 1 tablet by mouth 2 times daily    Ar Automatic Reconciliation   magnesium oxide (MAG-OX) 400 MG tablet Take 400 mg by mouth daily    Ar Automatic Reconciliation   mycophenolate (MYFORTIC) 180 MG DR tablet Take 180 mg by mouth 2 times daily 19   Ar Automatic Reconciliation   NIFEdipine (PROCARDIA XL) 90 MG extended release tablet Take 90 mg by mouth daily 19   Ar Automatic Reconciliation   omeprazole (PRILOSEC) 40 MG delayed release Pt had TSH drawn by Gin Noel , and is asking Dr. Ring to review the results. Please advise. Thanks.

## 2023-06-22 NOTE — ANESTHESIA POSTPROCEDURE EVALUATION
Department of Anesthesiology  Postprocedure Note    Patient: Matthew Quiñones  MRN: [id number]  YOB: 1955  Date of evaluation: 6/22/2023      Procedure Summary     Date: 06/22/23 Room / Location: [facility] ENDO 03 / [facility] ENDOSCOPY    Anesthesia Start: 1126 Anesthesia Stop: 1150    Procedure: COLONOSCOPY (Abdomen) Diagnosis:       Renal transplant recipient      Congestive heart failure, unspecified HF chronicity, unspecified [facility]      Hypertension, unspecified type      Personal history of colonic polyps      Gastroesophageal reflux disease, unspecified whether esophagitis present      Obesity, unspecified classification, unspecified obesity type, unspecified whether serious comorbidity present      (Renal transplant recipient [Z94.0])      (Congestive heart failure, unspecified HF chronicity, unspecified heart failure type ([facility] [I50.9])      (Hypertension, unspecified type [I10])      (Personal history of colonic polyps [Z86.010])      (Gastroesophageal reflux disease, unspecified whether esophagitis present [K21.9])      (Obesity, unspecified classification, unspecified obesity type, unspecified whether serious comorbidity present [E66.9])    Surgeons: Evelyn Jovel MD Responsible Provider: Yoshi Ochoa MD    Anesthesia Type: MAC ASA Status: 3          Anesthesia Type: No value filed.     William Phase I: William Score: 8    William Phase II: William Score: 10      Anesthesia Post Evaluation    Patient location during evaluation: PACU  Patient participation: complete - patient participated  Level of consciousness: awake and alert  Pain score: 0  Airway patency: patent  Nausea & Vomiting: no nausea and no vomiting  Complications: no  Cardiovascular status: hemodynamically stable  Respiratory status: acceptable  Hydration status: euvolemic  Multimodal analgesia pain management approach

## 2023-06-22 NOTE — PERIOP NOTE
Patient's blood sugar is 77 mg/dL---Patient is A&Ox3--Patient given 8 oz of Apple juice to drink. Will re-check blood sugar in 15 minutes.

## 2023-07-20 ENCOUNTER — HOSPITAL ENCOUNTER (OUTPATIENT)
Facility: HOSPITAL | Age: 68
Setting detail: SPECIMEN
Discharge: HOME | End: 2023-07-20
Payer: MEDICARE

## 2023-07-20 DIAGNOSIS — E11.21 TYPE 2 DIABETES WITH NEPHROPATHY (HCC): ICD-10-CM

## 2023-07-20 LAB
ALBUMIN SERPL-MCNC: 3.4 G/DL (ref 3.4–5)
ALBUMIN/GLOB SERPL: 0.9 (ref 0.8–1.7)
ALP SERPL-CCNC: 120 U/L (ref 45–117)
ALT SERPL-CCNC: 90 U/L (ref 16–61)
ANION GAP SERPL CALC-SCNC: 3 MMOL/L (ref 3–18)
AST SERPL-CCNC: 43 U/L (ref 10–38)
BILIRUB SERPL-MCNC: 0.5 MG/DL (ref 0.2–1)
BUN SERPL-MCNC: 21 MG/DL (ref 7–18)
BUN/CREAT SERPL: 19 (ref 12–20)
CALCIUM SERPL-MCNC: 9.2 MG/DL (ref 8.5–10.1)
CHLORIDE SERPL-SCNC: 103 MMOL/L (ref 100–111)
CO2 SERPL-SCNC: 28 MMOL/L (ref 21–32)
CREAT SERPL-MCNC: 1.1 MG/DL (ref 0.6–1.3)
GLOBULIN SER CALC-MCNC: 4 G/DL (ref 2–4)
GLUCOSE SERPL-MCNC: 76 MG/DL (ref 74–99)
HBA1C MFR BLD: 8.1 % (ref 4.2–5.6)
POTASSIUM SERPL-SCNC: 4.5 MMOL/L (ref 3.5–5.5)
PROT SERPL-MCNC: 7.4 G/DL (ref 6.4–8.2)
SODIUM SERPL-SCNC: 134 MMOL/L (ref 136–145)

## 2023-07-20 PROCEDURE — 80053 COMPREHEN METABOLIC PANEL: CPT

## 2023-07-20 PROCEDURE — 83036 HEMOGLOBIN GLYCOSYLATED A1C: CPT

## 2023-07-20 PROCEDURE — 36415 COLL VENOUS BLD VENIPUNCTURE: CPT

## 2023-07-27 ENCOUNTER — OFFICE VISIT (OUTPATIENT)
Age: 68
End: 2023-07-27
Payer: MEDICARE

## 2023-07-27 ENCOUNTER — HOSPITAL ENCOUNTER (OUTPATIENT)
Facility: HOSPITAL | Age: 68
Setting detail: SPECIMEN
Discharge: HOME | End: 2023-07-27
Payer: MEDICARE

## 2023-07-27 VITALS
RESPIRATION RATE: 18 BRPM | HEIGHT: 72 IN | TEMPERATURE: 98.5 F | SYSTOLIC BLOOD PRESSURE: 128 MMHG | HEART RATE: 81 BPM | DIASTOLIC BLOOD PRESSURE: 60 MMHG | BODY MASS INDEX: 25.33 KG/M2 | OXYGEN SATURATION: 99 % | WEIGHT: 187 LBS

## 2023-07-27 DIAGNOSIS — E11.21 TYPE 2 DIABETES WITH NEPHROPATHY (HCC): ICD-10-CM

## 2023-07-27 DIAGNOSIS — E78.5 DYSLIPIDEMIA: ICD-10-CM

## 2023-07-27 DIAGNOSIS — Z94.0 S/P KIDNEY TRANSPLANT: ICD-10-CM

## 2023-07-27 DIAGNOSIS — R74.01 TRANSAMINASEMIA: Primary | ICD-10-CM

## 2023-07-27 DIAGNOSIS — D12.6 COLON ADENOMA: ICD-10-CM

## 2023-07-27 DIAGNOSIS — I10 PRIMARY HYPERTENSION: ICD-10-CM

## 2023-07-27 LAB
CREAT UR-MCNC: 29 MG/DL (ref 30–125)
MICROALBUMIN UR-MCNC: 0.54 MG/DL (ref 0–3)
MICROALBUMIN/CREAT UR-RTO: 19 MG/G (ref 0–30)

## 2023-07-27 PROCEDURE — 2022F DILAT RTA XM EVC RTNOPTHY: CPT | Performed by: INTERNAL MEDICINE

## 2023-07-27 PROCEDURE — 99214 OFFICE O/P EST MOD 30 MIN: CPT | Performed by: INTERNAL MEDICINE

## 2023-07-27 PROCEDURE — 82043 UR ALBUMIN QUANTITATIVE: CPT

## 2023-07-27 PROCEDURE — 3078F DIAST BP <80 MM HG: CPT | Performed by: INTERNAL MEDICINE

## 2023-07-27 PROCEDURE — G8427 DOCREV CUR MEDS BY ELIG CLIN: HCPCS | Performed by: INTERNAL MEDICINE

## 2023-07-27 PROCEDURE — 1123F ACP DISCUSS/DSCN MKR DOCD: CPT | Performed by: INTERNAL MEDICINE

## 2023-07-27 PROCEDURE — 3052F HG A1C>EQUAL 8.0%<EQUAL 9.0%: CPT | Performed by: INTERNAL MEDICINE

## 2023-07-27 PROCEDURE — 82570 ASSAY OF URINE CREATININE: CPT

## 2023-07-27 PROCEDURE — 1036F TOBACCO NON-USER: CPT | Performed by: INTERNAL MEDICINE

## 2023-07-27 PROCEDURE — 3074F SYST BP LT 130 MM HG: CPT | Performed by: INTERNAL MEDICINE

## 2023-07-27 PROCEDURE — 3017F COLORECTAL CA SCREEN DOC REV: CPT | Performed by: INTERNAL MEDICINE

## 2023-07-27 PROCEDURE — G8417 CALC BMI ABV UP PARAM F/U: HCPCS | Performed by: INTERNAL MEDICINE

## 2023-07-27 SDOH — ECONOMIC STABILITY: INCOME INSECURITY: HOW HARD IS IT FOR YOU TO PAY FOR THE VERY BASICS LIKE FOOD, HOUSING, MEDICAL CARE, AND HEATING?: NOT HARD AT ALL

## 2023-07-27 SDOH — ECONOMIC STABILITY: HOUSING INSECURITY
IN THE LAST 12 MONTHS, WAS THERE A TIME WHEN YOU DID NOT HAVE A STEADY PLACE TO SLEEP OR SLEPT IN A SHELTER (INCLUDING NOW)?: NO

## 2023-07-27 SDOH — ECONOMIC STABILITY: FOOD INSECURITY: WITHIN THE PAST 12 MONTHS, YOU WORRIED THAT YOUR FOOD WOULD RUN OUT BEFORE YOU GOT MONEY TO BUY MORE.: NEVER TRUE

## 2023-07-27 SDOH — ECONOMIC STABILITY: FOOD INSECURITY: WITHIN THE PAST 12 MONTHS, THE FOOD YOU BOUGHT JUST DIDN'T LAST AND YOU DIDN'T HAVE MONEY TO GET MORE.: NEVER TRUE

## 2023-08-16 ENCOUNTER — HOSPITAL ENCOUNTER (OUTPATIENT)
Facility: HOSPITAL | Age: 68
Discharge: HOME | End: 2023-08-19
Payer: MEDICARE

## 2023-08-16 DIAGNOSIS — R74.01 TRANSAMINASEMIA: ICD-10-CM

## 2023-08-16 PROCEDURE — 76700 US EXAM ABDOM COMPLETE: CPT

## 2023-10-06 RX ORDER — ATORVASTATIN CALCIUM 80 MG/1
TABLET, FILM COATED ORAL
Qty: 90 TABLET | Refills: 3 | Status: SHIPPED | OUTPATIENT
Start: 2023-10-06

## 2023-11-01 NOTE — TELEPHONE ENCOUNTER
PCP: Hira Mejia MD    LAST REFILL PER CHART:  ezetimibe (ZETIA) 10 mg tablet 30 Tablet 11 10/31/2022    Sig - Route: Take 1 Tablet by mouth daily. - Oral   Sent to pharmacy as: ezetimibe 10 mg tablet (ZETIA)   E-Prescribing Status: Receipt confirmed by pharmacy (10/31/2022  4:32 PM EDT)    Future Appointments   Date Time Provider Department Center   1/24/2024 11:00 AM IO LAB VISIT IO BS AMB   1/31/2024 11:00 AM Hira Mejia MD IO BS AMB

## 2023-11-03 RX ORDER — EZETIMIBE 10 MG/1
10 TABLET ORAL DAILY
Qty: 90 TABLET | Refills: 3 | Status: SHIPPED | OUTPATIENT
Start: 2023-11-03

## 2023-11-03 RX ORDER — OMEPRAZOLE 40 MG/1
40 CAPSULE, DELAYED RELEASE ORAL DAILY
Qty: 90 CAPSULE | Refills: 2 | Status: SHIPPED | OUTPATIENT
Start: 2023-11-03

## 2023-11-14 ENCOUNTER — TELEPHONE (OUTPATIENT)
Age: 68
End: 2023-11-14

## 2023-11-14 NOTE — TELEPHONE ENCOUNTER
Patient called in stating that he need his prescription called in to [facility], so they can continue to pay for it.    cinacalcet (SENSIPAR) 60 MG tablet     [id number]

## 2023-11-15 ENCOUNTER — TELEPHONE (OUTPATIENT)
Age: 68
End: 2023-11-15

## 2023-11-15 RX ORDER — CINACALCET 60 MG/1
60 TABLET, FILM COATED ORAL
Qty: 90 TABLET | Refills: 2 | Status: CANCELLED | OUTPATIENT
Start: 2023-11-15

## 2024-01-11 ENCOUNTER — TELEPHONE (OUTPATIENT)
Age: 69
End: 2024-01-11

## 2024-01-11 RX ORDER — ATORVASTATIN CALCIUM 40 MG/1
40 TABLET, FILM COATED ORAL DAILY
Qty: 90 TABLET | Refills: 3 | Status: SHIPPED | OUTPATIENT
Start: 2024-01-11

## 2024-01-11 NOTE — TELEPHONE ENCOUNTER
----- Message from Corrina Cruz sent at 1/11/2024 12:32 PM EST -----  Subject: Medication Problem     Medication: atorvastatin (LIPITOR) 80 MG tablet  Dosage: TAKE ONE TABLET BY MOUTH DAILY  Ordering Provider: Tin Mejia    Question/Problem: Patient stated that Dr. Yo, his kidney specialist,   instructed that the patient cut those 80 mg pills in half and only take 40   mg (half tablet) per day. Please update RX at Pharmacy, if needed and   please instruct patient. Thank you       Pharmacy: [facility] [id number] -   P [phone] - F [phone]    ---------------------------------------------------------------------------  --------------  CALL BACK INFO  [phone]; OK to leave message on voicemail,Do not leave any message,   patient will call back for answer  ---------------------------------------------------------------------------  --------------    SCRIPT ANSWERS  Relationship to Patient: Self

## 2024-01-22 ENCOUNTER — TELEPHONE (OUTPATIENT)
Age: 69
End: 2024-01-22

## 2024-01-22 NOTE — TELEPHONE ENCOUNTER
----- Message from Caitlyn Christopher sent at 1/22/2024 11:02 AM EST -----  Subject: Message to Provider    QUESTIONS  Information for Provider? Patient would like to reschedule his 1/24/24   appt. at 11 to sometime in February, please advise   ---------------------------------------------------------------------------  --------------  CALL BACK INFO  [phone]; Do not leave any message, patient will call back for answer  ---------------------------------------------------------------------------  --------------  SCRIPT ANSWERS  Relationship to Patient? Self

## 2024-01-29 ENCOUNTER — TELEPHONE (OUTPATIENT)
Age: 69
End: 2024-01-29

## 2024-01-29 NOTE — TELEPHONE ENCOUNTER
----- Message from Maribel Dwaine sent at 1/29/2024 10:45 AM EST -----  Subject: Message to Provider    QUESTIONS  Information for Provider? Patient returning missed call on 1/23 about   message left on 1/22 about needing to reschedule lab appointment,   originally booked for 1/24. Please call back.  ---------------------------------------------------------------------------  --------------  CALL BACK INFO  [phone]; Do not leave any message, patient will call back for answer  ---------------------------------------------------------------------------  --------------  SCRIPT ANSWERS  undefined

## 2024-02-13 ENCOUNTER — HOSPITAL ENCOUNTER (OUTPATIENT)
Facility: HOSPITAL | Age: 69
Setting detail: SPECIMEN
Discharge: HOME | End: 2024-02-16
Payer: MEDICARE

## 2024-02-13 DIAGNOSIS — R74.01 TRANSAMINASEMIA: ICD-10-CM

## 2024-02-13 DIAGNOSIS — E78.5 DYSLIPIDEMIA: ICD-10-CM

## 2024-02-13 DIAGNOSIS — E11.21 TYPE 2 DIABETES WITH NEPHROPATHY (HCC): ICD-10-CM

## 2024-02-13 LAB
ALBUMIN SERPL-MCNC: 3.7 G/DL (ref 3.4–5)
ALBUMIN/GLOB SERPL: 1 (ref 0.8–1.7)
ALP SERPL-CCNC: 139 U/L (ref 45–117)
ALT SERPL-CCNC: 60 U/L (ref 16–61)
ANION GAP SERPL CALC-SCNC: 2 MMOL/L (ref 3–18)
AST SERPL-CCNC: 30 U/L (ref 10–38)
BILIRUB SERPL-MCNC: 0.6 MG/DL (ref 0.2–1)
BUN SERPL-MCNC: 25 MG/DL (ref 7–18)
BUN/CREAT SERPL: 20 (ref 12–20)
CALCIUM SERPL-MCNC: 10.6 MG/DL (ref 8.5–10.1)
CHLORIDE SERPL-SCNC: 109 MMOL/L (ref 100–111)
CHOLEST SERPL-MCNC: 104 MG/DL
CO2 SERPL-SCNC: 28 MMOL/L (ref 21–32)
CREAT SERPL-MCNC: 1.25 MG/DL (ref 0.6–1.3)
FERRITIN SERPL-MCNC: 709 NG/ML (ref 8–388)
GLOBULIN SER CALC-MCNC: 3.6 G/DL (ref 2–4)
GLUCOSE SERPL-MCNC: 67 MG/DL (ref 74–99)
HBA1C MFR BLD: 7.8 % (ref 4.2–5.6)
HDLC SERPL-MCNC: 34 MG/DL (ref 40–60)
HDLC SERPL: 3.1 (ref 0–5)
IRON SATN MFR SERPL: 28 % (ref 20–50)
IRON SERPL-MCNC: 65 UG/DL (ref 50–175)
LDLC SERPL CALC-MCNC: 36.2 MG/DL (ref 0–100)
LIPID PANEL: ABNORMAL
POTASSIUM SERPL-SCNC: 4.2 MMOL/L (ref 3.5–5.5)
PROT SERPL-MCNC: 7.3 G/DL (ref 6.4–8.2)
SODIUM SERPL-SCNC: 139 MMOL/L (ref 136–145)
TIBC SERPL-MCNC: 236 UG/DL (ref 250–450)
TRIGL SERPL-MCNC: 169 MG/DL
VLDLC SERPL CALC-MCNC: 33.8 MG/DL

## 2024-02-13 PROCEDURE — 82390 ASSAY OF CERULOPLASMIN: CPT

## 2024-02-13 PROCEDURE — 86381 MITOCHONDRIAL ANTIBODY EACH: CPT

## 2024-02-13 PROCEDURE — 80053 COMPREHEN METABOLIC PANEL: CPT

## 2024-02-13 PROCEDURE — 80061 LIPID PANEL: CPT

## 2024-02-13 PROCEDURE — 86803 HEPATITIS C AB TEST: CPT

## 2024-02-13 PROCEDURE — 83036 HEMOGLOBIN GLYCOSYLATED A1C: CPT

## 2024-02-13 PROCEDURE — 36415 COLL VENOUS BLD VENIPUNCTURE: CPT

## 2024-02-13 PROCEDURE — 87340 HEPATITIS B SURFACE AG IA: CPT

## 2024-02-13 PROCEDURE — 83550 IRON BINDING TEST: CPT

## 2024-02-13 PROCEDURE — 86038 ANTINUCLEAR ANTIBODIES: CPT

## 2024-02-13 PROCEDURE — 82728 ASSAY OF FERRITIN: CPT

## 2024-02-13 PROCEDURE — 83540 ASSAY OF IRON: CPT

## 2024-02-14 LAB
HBV SURFACE AG SER QL: <0.1 INDEX
HBV SURFACE AG SER QL: NEGATIVE
HCV AB SER IA-ACNC: 0.06 INDEX
HCV AB SERPL QL IA: NEGATIVE
HEPATITIS C COMMENT: NORMAL

## 2024-02-15 LAB
ANA TITR SER IF: NEGATIVE
CERULOPLASMIN SERPL-MCNC: 28.3 MG/DL (ref 16–31)

## 2024-02-16 LAB — MITOCHONDRIA M2 IGG SER-ACNC: <20 UNITS (ref 0–20)

## 2024-02-19 ENCOUNTER — TELEPHONE (OUTPATIENT)
Age: 69
End: 2024-02-19

## 2024-02-19 NOTE — TELEPHONE ENCOUNTER
----- Message from Jackson Davian sent at 2/19/2024  3:46 PM EST -----  Subject: Message to Provider    QUESTIONS  Information for Provider? patient received a call from the office please   call patient back .  ---------------------------------------------------------------------------  --------------  CALL BACK INFO  [phone]; OK to leave message on voicemail  ---------------------------------------------------------------------------  --------------  SCRIPT ANSWERS  Relationship to Patient? Self

## 2024-02-20 ENCOUNTER — OFFICE VISIT (OUTPATIENT)
Age: 69
End: 2024-02-20
Payer: MEDICARE

## 2024-02-20 VITALS
TEMPERATURE: 98.1 F | HEART RATE: 75 BPM | SYSTOLIC BLOOD PRESSURE: 148 MMHG | RESPIRATION RATE: 16 BRPM | WEIGHT: 193 LBS | DIASTOLIC BLOOD PRESSURE: 77 MMHG | HEIGHT: 72 IN | BODY MASS INDEX: 26.14 KG/M2 | OXYGEN SATURATION: 98 %

## 2024-02-20 DIAGNOSIS — R74.8 ELEVATED ALKALINE PHOSPHATASE LEVEL: ICD-10-CM

## 2024-02-20 DIAGNOSIS — E78.5 DYSLIPIDEMIA: ICD-10-CM

## 2024-02-20 DIAGNOSIS — Z12.5 SCREENING FOR MALIGNANT NEOPLASM OF PROSTATE: ICD-10-CM

## 2024-02-20 DIAGNOSIS — Z94.0 S/P KIDNEY TRANSPLANT: ICD-10-CM

## 2024-02-20 DIAGNOSIS — G47.33 OBSTRUCTIVE SLEEP APNEA SYNDROME: ICD-10-CM

## 2024-02-20 DIAGNOSIS — D12.6 COLON ADENOMA: ICD-10-CM

## 2024-02-20 DIAGNOSIS — Z23 NEED FOR PROPHYLACTIC VACCINATION AGAINST STREPTOCOCCUS PNEUMONIAE (PNEUMOCOCCUS): ICD-10-CM

## 2024-02-20 DIAGNOSIS — Z71.89 ADVANCED CARE PLANNING/COUNSELING DISCUSSION: ICD-10-CM

## 2024-02-20 DIAGNOSIS — I10 PRIMARY HYPERTENSION: ICD-10-CM

## 2024-02-20 DIAGNOSIS — E11.21 TYPE 2 DIABETES WITH NEPHROPATHY (HCC): ICD-10-CM

## 2024-02-20 DIAGNOSIS — Z00.00 MEDICARE ANNUAL WELLNESS VISIT, SUBSEQUENT: Primary | ICD-10-CM

## 2024-02-20 PROCEDURE — G8427 DOCREV CUR MEDS BY ELIG CLIN: HCPCS | Performed by: INTERNAL MEDICINE

## 2024-02-20 PROCEDURE — 99214 OFFICE O/P EST MOD 30 MIN: CPT | Performed by: INTERNAL MEDICINE

## 2024-02-20 PROCEDURE — 3017F COLORECTAL CA SCREEN DOC REV: CPT | Performed by: INTERNAL MEDICINE

## 2024-02-20 PROCEDURE — 1036F TOBACCO NON-USER: CPT | Performed by: INTERNAL MEDICINE

## 2024-02-20 PROCEDURE — G8484 FLU IMMUNIZE NO ADMIN: HCPCS | Performed by: INTERNAL MEDICINE

## 2024-02-20 PROCEDURE — 2022F DILAT RTA XM EVC RTNOPTHY: CPT | Performed by: INTERNAL MEDICINE

## 2024-02-20 PROCEDURE — 3051F HG A1C>EQUAL 7.0%<8.0%: CPT | Performed by: INTERNAL MEDICINE

## 2024-02-20 PROCEDURE — G0009 ADMIN PNEUMOCOCCAL VACCINE: HCPCS | Performed by: INTERNAL MEDICINE

## 2024-02-20 PROCEDURE — 1123F ACP DISCUSS/DSCN MKR DOCD: CPT | Performed by: INTERNAL MEDICINE

## 2024-02-20 PROCEDURE — G0439 PPPS, SUBSEQ VISIT: HCPCS | Performed by: INTERNAL MEDICINE

## 2024-02-20 PROCEDURE — 3077F SYST BP >= 140 MM HG: CPT | Performed by: INTERNAL MEDICINE

## 2024-02-20 PROCEDURE — 3078F DIAST BP <80 MM HG: CPT | Performed by: INTERNAL MEDICINE

## 2024-02-20 PROCEDURE — PBSHW PNEUMOCOCCAL, PCV20, PREVNAR 20, (AGE 6W+), IM, PF: Performed by: INTERNAL MEDICINE

## 2024-02-20 PROCEDURE — G8417 CALC BMI ABV UP PARAM F/U: HCPCS | Performed by: INTERNAL MEDICINE

## 2024-02-20 PROCEDURE — 99497 ADVNCD CARE PLAN 30 MIN: CPT | Performed by: INTERNAL MEDICINE

## 2024-02-20 ASSESSMENT — LIFESTYLE VARIABLES
HOW MANY STANDARD DRINKS CONTAINING ALCOHOL DO YOU HAVE ON A TYPICAL DAY: PATIENT DOES NOT DRINK
HOW OFTEN DO YOU HAVE A DRINK CONTAINING ALCOHOL: NEVER

## 2024-02-20 ASSESSMENT — PATIENT HEALTH QUESTIONNAIRE - PHQ9
2. FEELING DOWN, DEPRESSED OR HOPELESS: 0
SUM OF ALL RESPONSES TO PHQ QUESTIONS 1-9: 0
SUM OF ALL RESPONSES TO PHQ9 QUESTIONS 1 & 2: 0
1. LITTLE INTEREST OR PLEASURE IN DOING THINGS: 0
SUM OF ALL RESPONSES TO PHQ QUESTIONS 1-9: 0

## 2024-02-21 NOTE — PATIENT INSTRUCTIONS
medical condition or this instruction, always ask your healthcare professional. Healthwise, [employer] disclaims any warranty or liability for your use of this information.      Personalized Preventive Plan for Elliott Cruz - 2/20/2024  Medicare offers a range of preventive health benefits. Some of the tests and screenings are paid in full while other may be subject to a deductible, co-insurance, and/or copay.    Some of these benefits include a comprehensive review of your medical history including lifestyle, illnesses that may run in your family, and various assessments and screenings as appropriate.    After reviewing your medical record and screening and assessments performed today your provider may have ordered immunizations, labs, imaging, and/or referrals for you.  A list of these orders (if applicable) as well as your Preventive Care list are included within your After Visit Summary for your review.    Other Preventive Recommendations:    A preventive eye exam performed by an eye specialist is recommended every 1-2 years to screen for glaucoma; cataracts, macular degeneration, and other eye disorders.  A preventive dental visit is recommended every 6 months.  Try to get at least 150 minutes of exercise per week or 10,000 steps per day on a pedometer .  Order or download the FREE \"Exercise & Physical Activity: Your Everyday Guide\" from [employer] [employer]. Call 1-816.958.6288 or search [employer] [employer] online.  You need 8630-3437 mg of calcium and 3772-0204 IU of vitamin D per day. It is possible to meet your calcium requirement with diet alone, but a vitamin D supplement is usually necessary to meet this goal.  When exposed to the sun, use a sunscreen that protects against both UVA and UVB radiation with an SPF of 30 or greater. Reapply every 2 to 3 hours or after sweating, drying off with a towel, or swimming.  Always wear a seat belt when traveling in a car. Always wear a

## 2024-02-21 NOTE — PROGRESS NOTES
Medicare Annual Wellness Visit    Elliott Cruz is here for Medicare AWV    Assessment & Plan   Need for prophylactic vaccination against Streptococcus pneumoniae (pneumococcus)  -     Pneumococcal, PCV20, PREVNAR 20, (age 18 yrs+), IM, PF  Advanced care planning/counseling discussion  Primary hypertension  Obstructive sleep apnea syndrome  Colon adenoma  Type 2 diabetes with nephropathy (HCC)  -     Basic Metabolic Panel; Future  -     HEMOGLOBIN A1C W/O EAG; Future  -     CBC with Auto Differential; Future  -     Microalbumin / Creatinine Urine Ratio; Future  Dyslipidemia  S/P kidney transplant  Elevated alkaline phosphatase level  -     Alkaline Phosphatase, Isoenzymes; Future  Medicare annual wellness visit, subsequent    Recommendations for Preventive Services Due: see orders and patient instructions/AVS.  Recommended screening schedule for the next 5-10 years is provided to the patient in written form: see Patient Instructions/AVS.     Return for Medicare Annual Wellness Visit in 1 year.     Subjective       Patient's complete Health Risk Assessment and screening values have been reviewed and are found in Flowsheets. The following problems were reviewed today and where indicated follow up appointments were made and/or referrals ordered.    Positive Risk Factor Screenings with Interventions:    Fall Risk:  Do you feel unsteady or are you worried about falling? : (!) yes  2 or more falls in past year?: no  Fall with injury in past year?: no     Interventions:    Reviewed medications, home hazards, visual acuity, and co-morbidities that can increase risk for falls  Patient declines any further evaluation or treatment    Cognitive:      Words recalled: 1 Word Recalled     Total Score Interpretation: Abnormal Mini-Cog  Interventions:  Patient declines any further evaluation or treatment             Dentist Screen:  Have you seen the dentist within the past year?: (!) No    Intervention:  Patient declines any 
Gallagher SUNG GeorgesCruz presents today for   Chief Complaint   Patient presents with    Medicare AWV       \"Have you been to the ER, urgent care clinic since your last visit?  Hospitalized since your last visit?\"    YES - When: approximately 2 months ago.  Where and Why: Urgent Care, cut on toe.    “Have you seen or consulted any other health care providers outside of [facility] since your last visit?”    YES - When: approximately 2 months ago.  Where and Why: Nephrology, routine exam.             
Verbal order read back per Dr. Mejia.  Patient received Prevnar 20 vaccine in Right deltoid.  Patient tolerated well and left without complaints.  Patient received VIS.     
265, psa 1.89  From 6/20 showed               hba1c 4.8,         ldl-c 62  From 11/20 showed               hba1c 8.4  From 3/21 showed                hba1c 8.1,             chol 167, tg 231, hdl 29, ldl-c 92  From 8/21 showed   gluc 109, cr 1.40,          alt 65, hba1c 7.7,             chol 161, tg 260, hdl 29, ldl-c 81,  wbc 11.4, hb 17.2, plt 253  From 10/22 showed gluc 83,   cr 1.41,  alt 57,               chol 162, tg 226, hdl 32, ldl-c 85,  wbc 10.0, hb 16.4, plt 227, psa 2.22  From 12/22 showed                hba1c 7.1  From 3/23 showed   gluc 84,   cr 1.26,  alt 62, hba1c 8.2,             chol 95,   tg 168, hdl 31, ldl-c 30,       ast 62, ap 119, tb 0.4  From 7/23 showed   gluc 76,   cr 1.10,          alt 90, hba1c 8.1,             ast 43, ap 120, tb 0.5    Results for orders placed or performed during the hospital encounter of 02/13/24 (from the past 2160 hour(s))   Antinuclear AB Screen IFA   Result Value Ref Range    ALEXANDRA Negative     Ceruloplasmin   Result Value Ref Range    Ceruloplasmin 28.3 16.0 - 31.0 mg/dL   Hepatitis B Surface Antigen   Result Value Ref Range    Hepatitis B Surface Ag <0.10 <1.00 Index    Hep B S Ag Interp Negative NEG     Hepatitis C Antibody   Result Value Ref Range    Hepatitis C Ab .06 <0.80 Index    Hep C Ab Interp Negative NEG      Hepatitis C Comment       Mitochondrial antibodies, M2   Result Value Ref Range    Mitochondrial M2 Ab, IgG <20.0 0.0 - 20.0 Units   Comprehensive Metabolic Panel   Result Value Ref Range    Sodium 139 136 - 145 mmol/L    Potassium 4.2 3.5 - 5.5 mmol/L    Chloride 109 100 - 111 mmol/L    CO2 28 21 - 32 mmol/L    Anion Gap 2 (L) 3.0 - 18 mmol/L    Glucose 67 (L) 74 - 99 mg/dL    BUN 25 (H) 7.0 - 18 MG/DL    Creatinine 1.25 0.6 - 1.3 MG/DL    Bun/Cre Ratio 20 12 - 20      Est, Glom Filt Rate >60 >60 ml/min/1.73m2    Calcium 10.6 (H) 8.5 - 10.1 MG/DL    Total Bilirubin 0.6 0.2 - 1.0 MG/DL    ALT 60 16 - 61 U/L    AST 30 10 - 38 U/L    Alk Phosphatase

## 2024-02-21 NOTE — ACP (ADVANCE CARE PLANNING)
Advance Care Planning     Advance Care Planning (ACP) Physician/NP/PA Conversation    Date of Conversation: 2/20/2024  Conducted with: Patient with Decision Making Capacity    Healthcare Decision Maker:      Primary Decision Maker: Glenys Cruz - [phone]    Click here to complete Healthcare Decision Makers including selection of the Healthcare Decision Maker Relationship (ie \"Primary\")  Today we documented Decision Maker(s) consistent with Legal Next of Kin hierarchy.    Care Preferences:    Hospitalization:  \"If your health worsens and it becomes clear that your chance of recovery is unlikely, what would be your preference regarding hospitalization?\"  The patient would prefer hospitalization.    Ventilation:  \"If you were unable to breath on your own and your chance of recovery was unlikely, what would be your preference about the use of a ventilator (breathing machine) if it was available to you?\"  The patient would desire the use of a ventilator.    Resuscitation:  \"In the event your heart stopped as a result of an underlying serious health condition, would you want attempts made to restart your heart, or would you prefer a natural death?\"  Yes, attempt to resuscitate.    ventilation preferences, hospitalization preferences, and resuscitation preferences    Conversation Outcomes / Follow-Up Plan:  ACP in process - information provided, considering goals and options  Reviewed DNR/DNI and patient elects Full Code (Attempt Resuscitation)    Length of Voluntary ACP Conversation in minutes:  16 minutes    DENISHA STONE MD

## 2024-07-10 ENCOUNTER — OFFICE VISIT (OUTPATIENT)
Facility: CLINIC | Age: 69
End: 2024-07-10
Payer: MEDICARE

## 2024-07-10 VITALS
WEIGHT: 193 LBS | RESPIRATION RATE: 16 BRPM | DIASTOLIC BLOOD PRESSURE: 74 MMHG | SYSTOLIC BLOOD PRESSURE: 136 MMHG | TEMPERATURE: 98.5 F | BODY MASS INDEX: 26.14 KG/M2 | HEART RATE: 77 BPM | OXYGEN SATURATION: 97 % | HEIGHT: 72 IN

## 2024-07-10 DIAGNOSIS — R05.1 ACUTE COUGH: Primary | ICD-10-CM

## 2024-07-10 DIAGNOSIS — J40 BRONCHITIS: ICD-10-CM

## 2024-07-10 PROCEDURE — G8427 DOCREV CUR MEDS BY ELIG CLIN: HCPCS | Performed by: INTERNAL MEDICINE

## 2024-07-10 PROCEDURE — 3075F SYST BP GE 130 - 139MM HG: CPT | Performed by: INTERNAL MEDICINE

## 2024-07-10 PROCEDURE — 1123F ACP DISCUSS/DSCN MKR DOCD: CPT | Performed by: INTERNAL MEDICINE

## 2024-07-10 PROCEDURE — 99213 OFFICE O/P EST LOW 20 MIN: CPT | Performed by: INTERNAL MEDICINE

## 2024-07-10 PROCEDURE — G8417 CALC BMI ABV UP PARAM F/U: HCPCS | Performed by: INTERNAL MEDICINE

## 2024-07-10 PROCEDURE — 1036F TOBACCO NON-USER: CPT | Performed by: INTERNAL MEDICINE

## 2024-07-10 PROCEDURE — 3017F COLORECTAL CA SCREEN DOC REV: CPT | Performed by: INTERNAL MEDICINE

## 2024-07-10 PROCEDURE — 3078F DIAST BP <80 MM HG: CPT | Performed by: INTERNAL MEDICINE

## 2024-07-10 RX ORDER — DORZOLAMIDE HYDROCHLORIDE AND TIMOLOL MALEATE 20; 5 MG/ML; MG/ML
1 SOLUTION/ DROPS OPHTHALMIC 2 TIMES DAILY
COMMUNITY
Start: 2024-06-09

## 2024-07-10 RX ORDER — PREDNISOLONE ACETATE 10 MG/ML
1 SUSPENSION/ DROPS OPHTHALMIC 2 TIMES DAILY
COMMUNITY
Start: 2024-05-14

## 2024-07-10 RX ORDER — HYDROCODONE POLISTIREX AND CHLORPHENIRAMINE POLISTIREX 10; 8 MG/5ML; MG/5ML
5 SUSPENSION, EXTENDED RELEASE ORAL EVERY 12 HOURS PRN
Qty: 100 ML | Refills: 0 | Status: SHIPPED | OUTPATIENT
Start: 2024-07-10 | End: 2024-07-20

## 2024-07-10 RX ORDER — AZITHROMYCIN 250 MG/1
TABLET, FILM COATED ORAL
Qty: 6 TABLET | Refills: 0 | Status: SHIPPED | OUTPATIENT
Start: 2024-07-10 | End: 2024-07-20

## 2024-07-10 RX ORDER — ASPIRIN 325 MG
325 TABLET ORAL DAILY
COMMUNITY

## 2024-07-10 RX ORDER — PIOGLITAZONEHYDROCHLORIDE 30 MG/1
30 TABLET ORAL DAILY
COMMUNITY
Start: 2024-07-05

## 2024-07-10 NOTE — PROGRESS NOTES
Gallagher SUNG GeorgesCruz presents today for   Chief Complaint   Patient presents with    Cough     About 2 weeks       \"Have you been to the ER, urgent care clinic since your last visit?  Hospitalized since your last visit?\"    NO    “Have you seen or consulted any other health care providers outside of [facility] since your last visit?”    YES - When: approximately 4 months ago.  Where and Why: [facility], routine visit.                        
otherwise benign, no adenopathy.  Lungs are clear with good breath sounds bilaterally.  Heart showed regular rate rhythm.  Abdomen soft and nontender    Assessment and plan:  1.  Bronchitis.  I gave him Z-Hu and Tussionex, call if no improvement        Above conditions discussed at length and patient vocalized understanding.  All questions answered to patient satisfaction

## 2024-08-13 ENCOUNTER — TELEPHONE (OUTPATIENT)
Facility: CLINIC | Age: 69
End: 2024-08-13

## 2024-08-13 ENCOUNTER — HOSPITAL ENCOUNTER (OUTPATIENT)
Facility: HOSPITAL | Age: 69
Setting detail: SPECIMEN
Discharge: HOME | End: 2024-08-16
Payer: MEDICARE

## 2024-08-13 DIAGNOSIS — R74.8 ELEVATED ALKALINE PHOSPHATASE LEVEL: ICD-10-CM

## 2024-08-13 DIAGNOSIS — E11.21 TYPE 2 DIABETES WITH NEPHROPATHY (HCC): ICD-10-CM

## 2024-08-13 DIAGNOSIS — Z12.5 SCREENING FOR MALIGNANT NEOPLASM OF PROSTATE: ICD-10-CM

## 2024-08-13 LAB
ANION GAP SERPL CALC-SCNC: 7 MMOL/L (ref 3–18)
BASOPHILS # BLD: 0.1 K/UL (ref 0–0.1)
BASOPHILS NFR BLD: 1 % (ref 0–2)
BUN SERPL-MCNC: 24 MG/DL (ref 7–18)
BUN/CREAT SERPL: 18 (ref 12–20)
CALCIUM SERPL-MCNC: 10.1 MG/DL (ref 8.5–10.1)
CHLORIDE SERPL-SCNC: 105 MMOL/L (ref 100–111)
CO2 SERPL-SCNC: 27 MMOL/L (ref 21–32)
CREAT SERPL-MCNC: 1.36 MG/DL (ref 0.6–1.3)
DIFFERENTIAL METHOD BLD: ABNORMAL
EOSINOPHIL # BLD: 0.1 K/UL (ref 0–0.4)
EOSINOPHIL NFR BLD: 2 % (ref 0–5)
ERYTHROCYTE [DISTWIDTH] IN BLOOD BY AUTOMATED COUNT: 14.7 % (ref 11.6–14.5)
GLUCOSE SERPL-MCNC: 119 MG/DL (ref 74–99)
HBA1C MFR BLD: 6.6 % (ref 4.2–5.6)
HCT VFR BLD AUTO: 55.3 % (ref 36–48)
HGB BLD-MCNC: 17.4 G/DL (ref 13–16)
IMM GRANULOCYTES # BLD AUTO: 0 K/UL (ref 0–0.04)
IMM GRANULOCYTES NFR BLD AUTO: 0 % (ref 0–0.5)
LYMPHOCYTES # BLD: 0.7 K/UL (ref 0.9–3.6)
LYMPHOCYTES NFR BLD: 10 % (ref 21–52)
MCH RBC QN AUTO: 29.9 PG (ref 24–34)
MCHC RBC AUTO-ENTMCNC: 31.5 G/DL (ref 31–37)
MCV RBC AUTO: 95 FL (ref 78–100)
MONOCYTES # BLD: 0.5 K/UL (ref 0.05–1.2)
MONOCYTES NFR BLD: 8 % (ref 3–10)
NEUTS SEG # BLD: 5.3 K/UL (ref 1.8–8)
NEUTS SEG NFR BLD: 80 % (ref 40–73)
NRBC # BLD: 0 K/UL (ref 0–0.01)
NRBC BLD-RTO: 0 PER 100 WBC
PLATELET # BLD AUTO: 232 K/UL (ref 135–420)
PMV BLD AUTO: 10.3 FL (ref 9.2–11.8)
POTASSIUM SERPL-SCNC: 4.2 MMOL/L (ref 3.5–5.5)
PSA SERPL-MCNC: 3 NG/ML (ref 0–4)
RBC # BLD AUTO: 5.82 M/UL (ref 4.35–5.65)
SODIUM SERPL-SCNC: 139 MMOL/L (ref 136–145)
WBC # BLD AUTO: 6.7 K/UL (ref 4.6–13.2)

## 2024-08-13 PROCEDURE — 80048 BASIC METABOLIC PNL TOTAL CA: CPT

## 2024-08-13 PROCEDURE — 36415 COLL VENOUS BLD VENIPUNCTURE: CPT

## 2024-08-13 PROCEDURE — 85025 COMPLETE CBC W/AUTO DIFF WBC: CPT

## 2024-08-13 PROCEDURE — 84075 ASSAY ALKALINE PHOSPHATASE: CPT

## 2024-08-13 PROCEDURE — 84080 ASSAY ALKALINE PHOSPHATASES: CPT

## 2024-08-13 PROCEDURE — 83036 HEMOGLOBIN GLYCOSYLATED A1C: CPT

## 2024-08-13 PROCEDURE — G0103 PSA SCREENING: HCPCS

## 2024-08-14 NOTE — PROGRESS NOTES
68 y.o. male who presents for evaluation.      He continues to see Dr Jacome and also Dr Shore at the transplant clinic and reports no new recs    He is walking about a mile daily without cp, sob, edema, claudication.  BP running in the 120-130s when he checks and at other doctor's offices    Denies any GI or  complaints.      Denies polyuria, polydipsia, nocturia, vision change. Weight is stable  He is being managed by Dr Olson.  He was also told that he has osteoporosis    Reports no bone pain anywhere outside of arthralgias in the usual spots    He has reginaldo but has not been using cpap in 10+ years, previously saw Dr Reyes?  Denies smoking, drinks about 50 oz/d fluid    LAST MEDICARE WELLNESS EXAM: 9/18/14, 9/24/15, 12/20/16, 3/23/18, 2/15/19, 2/21/20, 9/29/21, 10/31/22, 2/20/24    Past Medical History:   Diagnosis Date    Acute hearing loss of right ear     Acute urinary retention 08/2015    Anemia of chronic disease 2010    saw Dr. Camp in past    Asbestosis(501)     CHF (congestive heart failure) ([facility]) 2011    Chronic edema     Colon adenoma     Dr. Ochoa 2009, 2/15, 4/18    DM (diabetes mellitus) ([facility])     w neuropathy and retinopathy Dr Romero; now seing Dr Olson; EMG 2/22 Dr Hernández    Dyslipidemia     ED (erectile dysfunction)     ESRD on hemodialysis ([facility])     Dr. Harvey, M-W-F; [facility]    FHx: heart disease     GERD (gastroesophageal reflux disease)     Glaucoma     Dr. Velásquez    H/O cardiac catheterization 03/28/2017    Dr Gimenez; diffuse luminal irregularities without stenosis Cv, LAD, 20-30% mid RCA    H/O cardiovascular stress test     NST neg 2009    Hyperlipidemia     Hypertension     Metabolic dysfunction-associated steatotic liver disease (MASLD) 08/2023    on US (8/23); Fib-4 was 0.94 (10/22)    Nephrolithiasis     REGINALDO (obstructive sleep apnea)     no cpap    Osteoarthritis     Osteoporosis 2024    Dr Olson    Polycystic kidney 10/21/2019    Right

## 2024-08-14 NOTE — TELEPHONE ENCOUNTER
Received a call from [facility] for a critical lab result:    Lab Results   Component Value Date    HCT 55.3 (HH) 08/13/2024     Reviewed chart and last hemoglobin/hematocrit on 7/8/2024 performed by Dr. Jacome was Hb 17.0/HCT 51.4 so consistent with prior values.

## 2024-08-17 LAB
ALP BONE CFR SERPL: 67 % (ref 12–68)
ALP INTEST CFR SERPL: 1 % (ref 0–18)
ALP LIVER CFR SERPL: 32 % (ref 13–88)
ALP SERPL-CCNC: 128 IU/L (ref 44–121)

## 2024-08-18 RX ORDER — OMEPRAZOLE 40 MG/1
40 CAPSULE, DELAYED RELEASE ORAL DAILY
Qty: 90 CAPSULE | Refills: 2 | Status: SHIPPED | OUTPATIENT
Start: 2024-08-18

## 2024-08-20 ENCOUNTER — OFFICE VISIT (OUTPATIENT)
Facility: CLINIC | Age: 69
End: 2024-08-20
Payer: MEDICARE

## 2024-08-20 VITALS
RESPIRATION RATE: 16 BRPM | HEIGHT: 72 IN | TEMPERATURE: 98 F | OXYGEN SATURATION: 98 % | DIASTOLIC BLOOD PRESSURE: 72 MMHG | WEIGHT: 193 LBS | SYSTOLIC BLOOD PRESSURE: 126 MMHG | HEART RATE: 75 BPM | BODY MASS INDEX: 26.14 KG/M2

## 2024-08-20 DIAGNOSIS — E11.40 TYPE 2 DIABETES, CONTROLLED, WITH NEUROPATHY (HCC): ICD-10-CM

## 2024-08-20 DIAGNOSIS — D75.1 ERYTHROCYTOSIS: ICD-10-CM

## 2024-08-20 DIAGNOSIS — E78.5 DYSLIPIDEMIA: ICD-10-CM

## 2024-08-20 DIAGNOSIS — I10 PRIMARY HYPERTENSION: ICD-10-CM

## 2024-08-20 DIAGNOSIS — G47.33 OSA (OBSTRUCTIVE SLEEP APNEA): Primary | ICD-10-CM

## 2024-08-20 DIAGNOSIS — R74.8 ELEVATED ALKALINE PHOSPHATASE LEVEL: ICD-10-CM

## 2024-08-20 PROCEDURE — 3074F SYST BP LT 130 MM HG: CPT | Performed by: INTERNAL MEDICINE

## 2024-08-20 PROCEDURE — 1036F TOBACCO NON-USER: CPT | Performed by: INTERNAL MEDICINE

## 2024-08-20 PROCEDURE — 3044F HG A1C LEVEL LT 7.0%: CPT | Performed by: INTERNAL MEDICINE

## 2024-08-20 PROCEDURE — 3017F COLORECTAL CA SCREEN DOC REV: CPT | Performed by: INTERNAL MEDICINE

## 2024-08-20 PROCEDURE — 3078F DIAST BP <80 MM HG: CPT | Performed by: INTERNAL MEDICINE

## 2024-08-20 PROCEDURE — G8417 CALC BMI ABV UP PARAM F/U: HCPCS | Performed by: INTERNAL MEDICINE

## 2024-08-20 PROCEDURE — 2022F DILAT RTA XM EVC RTNOPTHY: CPT | Performed by: INTERNAL MEDICINE

## 2024-08-20 PROCEDURE — G8427 DOCREV CUR MEDS BY ELIG CLIN: HCPCS | Performed by: INTERNAL MEDICINE

## 2024-08-20 PROCEDURE — 99215 OFFICE O/P EST HI 40 MIN: CPT | Performed by: INTERNAL MEDICINE

## 2024-08-20 PROCEDURE — 1123F ACP DISCUSS/DSCN MKR DOCD: CPT | Performed by: INTERNAL MEDICINE

## 2024-08-20 NOTE — PROGRESS NOTES
Elliott Cruz presents today for   Chief Complaint   Patient presents with    6 Month Follow-Up    Hypertension    Diabetes       \"Have you been to the ER, urgent care clinic since your last visit?  Hospitalized since your last visit?\"    NO    “Have you seen or consulted any other health care providers outside of [facility] since your last visit?”    YES - When: approximately 1 months ago.  Where and Why: [facility], routine follow-up.

## 2024-09-19 ENCOUNTER — TELEPHONE (OUTPATIENT)
Facility: CLINIC | Age: 69
End: 2024-09-19

## 2024-10-28 RX ORDER — EZETIMIBE 10 MG/1
10 TABLET ORAL DAILY
Qty: 90 TABLET | Refills: 3 | Status: SHIPPED | OUTPATIENT
Start: 2024-10-28

## 2024-12-13 ENCOUNTER — TELEPHONE (OUTPATIENT)
Facility: CLINIC | Age: 69
End: 2024-12-13

## 2024-12-13 ENCOUNTER — HOSPITAL ENCOUNTER (OUTPATIENT)
Facility: HOSPITAL | Age: 69
Setting detail: SPECIMEN
Discharge: HOME | End: 2024-12-16
Payer: MEDICARE

## 2024-12-13 DIAGNOSIS — E11.40 TYPE 2 DIABETES, CONTROLLED, WITH NEUROPATHY (HCC): ICD-10-CM

## 2024-12-13 DIAGNOSIS — E78.5 DYSLIPIDEMIA: ICD-10-CM

## 2024-12-13 DIAGNOSIS — I10 PRIMARY HYPERTENSION: ICD-10-CM

## 2024-12-13 DIAGNOSIS — D75.1 ERYTHROCYTOSIS: ICD-10-CM

## 2024-12-13 LAB
ALBUMIN SERPL-MCNC: 3.6 G/DL (ref 3.4–5)
ALBUMIN/GLOB SERPL: 0.9 (ref 0.8–1.7)
ALP SERPL-CCNC: 142 U/L (ref 45–117)
ALT SERPL-CCNC: 39 U/L (ref 16–61)
ANION GAP SERPL CALC-SCNC: 2 MMOL/L (ref 3–18)
AST SERPL-CCNC: 25 U/L (ref 10–38)
BASOPHILS # BLD: 0 K/UL (ref 0–0.1)
BASOPHILS NFR BLD: 1 % (ref 0–2)
BILIRUB SERPL-MCNC: 0.6 MG/DL (ref 0.2–1)
BUN SERPL-MCNC: 25 MG/DL (ref 7–18)
BUN/CREAT SERPL: 20 (ref 12–20)
CALCIUM SERPL-MCNC: 10.6 MG/DL (ref 8.5–10.1)
CHLORIDE SERPL-SCNC: 106 MMOL/L (ref 100–111)
CHOLEST SERPL-MCNC: 109 MG/DL
CO2 SERPL-SCNC: 29 MMOL/L (ref 21–32)
CREAT SERPL-MCNC: 1.28 MG/DL (ref 0.6–1.3)
DIFFERENTIAL METHOD BLD: ABNORMAL
EOSINOPHIL # BLD: 0.1 K/UL (ref 0–0.4)
EOSINOPHIL NFR BLD: 2 % (ref 0–5)
ERYTHROCYTE [DISTWIDTH] IN BLOOD BY AUTOMATED COUNT: 14 % (ref 11.6–14.5)
GLOBULIN SER CALC-MCNC: 3.9 G/DL (ref 2–4)
GLUCOSE SERPL-MCNC: 82 MG/DL (ref 74–99)
HBA1C MFR BLD: 7.5 % (ref 4.2–5.6)
HCT VFR BLD AUTO: 57.5 % (ref 36–48)
HDLC SERPL-MCNC: 35 MG/DL (ref 40–60)
HDLC SERPL: 3.1 (ref 0–5)
HGB BLD-MCNC: 18.3 G/DL (ref 13–16)
IMM GRANULOCYTES # BLD AUTO: 0.1 K/UL (ref 0–0.04)
IMM GRANULOCYTES NFR BLD AUTO: 1 % (ref 0–0.5)
LDLC SERPL CALC-MCNC: 36.8 MG/DL (ref 0–100)
LIPID PANEL: ABNORMAL
LYMPHOCYTES # BLD: 0.6 K/UL (ref 0.9–3.6)
LYMPHOCYTES NFR BLD: 8 % (ref 21–52)
MCH RBC QN AUTO: 30.2 PG (ref 24–34)
MCHC RBC AUTO-ENTMCNC: 31.8 G/DL (ref 31–37)
MCV RBC AUTO: 95 FL (ref 78–100)
MONOCYTES # BLD: 0.8 K/UL (ref 0.05–1.2)
MONOCYTES NFR BLD: 11 % (ref 3–10)
NEUTS SEG # BLD: 6 K/UL (ref 1.8–8)
NEUTS SEG NFR BLD: 78 % (ref 40–73)
NRBC # BLD: 0 K/UL (ref 0–0.01)
NRBC BLD-RTO: 0 PER 100 WBC
PLATELET # BLD AUTO: 243 K/UL (ref 135–420)
PMV BLD AUTO: 10.2 FL (ref 9.2–11.8)
POTASSIUM SERPL-SCNC: 4.5 MMOL/L (ref 3.5–5.5)
PROT SERPL-MCNC: 7.5 G/DL (ref 6.4–8.2)
RBC # BLD AUTO: 6.05 M/UL (ref 4.35–5.65)
SODIUM SERPL-SCNC: 137 MMOL/L (ref 136–145)
TRIGL SERPL-MCNC: 186 MG/DL
VLDLC SERPL CALC-MCNC: 37.2 MG/DL
WBC # BLD AUTO: 7.7 K/UL (ref 4.6–13.2)

## 2024-12-13 PROCEDURE — 81279 JAK2 GENE TRGT SEQUENCE ALYS: CPT

## 2024-12-13 PROCEDURE — 82668 ASSAY OF ERYTHROPOIETIN: CPT

## 2024-12-13 PROCEDURE — 80061 LIPID PANEL: CPT

## 2024-12-13 PROCEDURE — 83036 HEMOGLOBIN GLYCOSYLATED A1C: CPT

## 2024-12-13 PROCEDURE — 36415 COLL VENOUS BLD VENIPUNCTURE: CPT

## 2024-12-13 PROCEDURE — 80053 COMPREHEN METABOLIC PANEL: CPT

## 2024-12-13 PROCEDURE — 85025 COMPLETE CBC W/AUTO DIFF WBC: CPT

## 2024-12-13 NOTE — TELEPHONE ENCOUNTER
Gama from [facility] called to report a critical lab for the patient.   Patient's Hematocrit is 57.5

## 2024-12-14 LAB
DIRECTOR REVIEW: 489204: NORMAL
DIRECTOR REVIEW: NORMAL
JAK 2 REFLEX STATUS: NORMAL
JAK2 V617F RESULT: NORMAL
METHOD: NORMAL
REFERENCES: NORMAL
V617F REFLEX E12-15 BACKGROUND: NORMAL

## 2024-12-15 LAB — EPO SERPL-ACNC: 8.4 MIU/ML (ref 2.6–18.5)

## 2024-12-23 ENCOUNTER — TELEPHONE (OUTPATIENT)
Facility: CLINIC | Age: 69
End: 2024-12-23

## 2024-12-23 DIAGNOSIS — G47.33 OSA (OBSTRUCTIVE SLEEP APNEA): Primary | ICD-10-CM

## 2024-12-23 NOTE — TELEPHONE ENCOUNTER
Patient has not seen sleep specialist. Patient states called 3 times and was told they would call back to schedule, never called back.     Please review and advise.

## 2024-12-26 NOTE — TELEPHONE ENCOUNTER
Referral faxed to Pulmonary & Critical Care, Sleep Medicine and copy of referral mailed to patient.    No further action required.

## 2024-12-27 NOTE — PROGRESS NOTES
69 y.o. male who presents for evaluation.      He continues to see Dr Jacome and also Dr Shore at the transplant clinic.  Reports that the actos was stopped due to swelling,     He is walking about a mile daily without cp, claudication.  BP running in the 120-130s when he checks.  He does get winded after the long walks, swelling as above and he was started on diuretic by Dr Shore?      Denies any GI or  complaints.      Denies polyuria, polydipsia, nocturia, vision change. Weight is stable  He is being managed by Dr Olson.    [facility] as he was never called by the other neurologist.  Continues to have polycythemia below    LAST MEDICARE WELLNESS EXAM: 9/18/14, 9/24/15, 12/20/16, 3/23/18, 2/15/19, 2/21/20, 9/29/21, 10/31/22, 2/20/24    Past Medical History:   Diagnosis Date    Acute hearing loss of right ear     Acute urinary retention 08/2015    Anemia of chronic disease 2010    saw Dr. Camp in past    [facility])     CHF (congestive heart failure) ([facility]) 2011    Chronic edema     Colon adenoma     Dr. Ochoa 2009, 2/15, 4/18    DM (diabetes mellitus) ([facility])     w neuropathy and retinopathy Dr Romero; now seing Dr Olson; EMG 2/22 Dr Hernández    Dyslipidemia     ED (erectile dysfunction)     ESRD on hemodialysis ([facility])     Dr. Harvey, M-W-F; s/p cadaveric renal transplant 2019 [facility]    FHx: heart disease     GERD (gastroesophageal reflux disease)     Glaucoma     Dr. Velásquez    H/O cardiac catheterization 03/28/2017    Dr Gimenez; diffuse luminal irregularities without stenosis Cv, LAD, 20-30% mid RCA    H/O cardiovascular stress test     NST neg 2009    Hyperlipidemia     Hypertension     Metabolic dysfunction-associated steatotic liver disease (MASLD) 08/2023    on US (8/23); Fib-4 was 0.94 (10/22)    Nephrolithiasis     CHON (obstructive sleep apnea)     no cpap    Osteoarthritis     Osteoporosis 2024    Dr Olson    Polycystic kidney 10/21/2019    Right foot drop 2019    S/P

## 2025-01-06 ENCOUNTER — OFFICE VISIT (OUTPATIENT)
Facility: CLINIC | Age: 70
End: 2025-01-06
Payer: MEDICARE

## 2025-01-06 VITALS
SYSTOLIC BLOOD PRESSURE: 146 MMHG | HEART RATE: 75 BPM | WEIGHT: 200 LBS | DIASTOLIC BLOOD PRESSURE: 75 MMHG | TEMPERATURE: 98 F | BODY MASS INDEX: 27.09 KG/M2 | OXYGEN SATURATION: 97 % | HEIGHT: 72 IN | RESPIRATION RATE: 16 BRPM

## 2025-01-06 DIAGNOSIS — G47.33 OBSTRUCTIVE SLEEP APNEA SYNDROME: ICD-10-CM

## 2025-01-06 DIAGNOSIS — E11.21 TYPE 2 DIABETES WITH NEPHROPATHY (HCC): ICD-10-CM

## 2025-01-06 DIAGNOSIS — E11.40 TYPE 2 DIABETES, CONTROLLED, WITH NEUROPATHY (HCC): ICD-10-CM

## 2025-01-06 DIAGNOSIS — I10 PRIMARY HYPERTENSION: ICD-10-CM

## 2025-01-06 DIAGNOSIS — E78.5 DYSLIPIDEMIA: ICD-10-CM

## 2025-01-06 DIAGNOSIS — Z94.0 S/P KIDNEY TRANSPLANT: ICD-10-CM

## 2025-01-06 DIAGNOSIS — D75.1 ERYTHROCYTOSIS: Primary | ICD-10-CM

## 2025-01-06 PROCEDURE — 3077F SYST BP >= 140 MM HG: CPT | Performed by: INTERNAL MEDICINE

## 2025-01-06 PROCEDURE — 3017F COLORECTAL CA SCREEN DOC REV: CPT | Performed by: INTERNAL MEDICINE

## 2025-01-06 PROCEDURE — 1126F AMNT PAIN NOTED NONE PRSNT: CPT | Performed by: INTERNAL MEDICINE

## 2025-01-06 PROCEDURE — 1036F TOBACCO NON-USER: CPT | Performed by: INTERNAL MEDICINE

## 2025-01-06 PROCEDURE — 3046F HEMOGLOBIN A1C LEVEL >9.0%: CPT | Performed by: INTERNAL MEDICINE

## 2025-01-06 PROCEDURE — M1308 PR FLU IMMUNIZE NO ADMIN: HCPCS | Performed by: INTERNAL MEDICINE

## 2025-01-06 PROCEDURE — G8427 DOCREV CUR MEDS BY ELIG CLIN: HCPCS | Performed by: INTERNAL MEDICINE

## 2025-01-06 PROCEDURE — G2211 COMPLEX E/M VISIT ADD ON: HCPCS | Performed by: INTERNAL MEDICINE

## 2025-01-06 PROCEDURE — 1159F MED LIST DOCD IN RCRD: CPT | Performed by: INTERNAL MEDICINE

## 2025-01-06 PROCEDURE — 2022F DILAT RTA XM EVC RTNOPTHY: CPT | Performed by: INTERNAL MEDICINE

## 2025-01-06 PROCEDURE — 1123F ACP DISCUSS/DSCN MKR DOCD: CPT | Performed by: INTERNAL MEDICINE

## 2025-01-06 PROCEDURE — G8417 CALC BMI ABV UP PARAM F/U: HCPCS | Performed by: INTERNAL MEDICINE

## 2025-01-06 PROCEDURE — 3078F DIAST BP <80 MM HG: CPT | Performed by: INTERNAL MEDICINE

## 2025-01-06 PROCEDURE — 99214 OFFICE O/P EST MOD 30 MIN: CPT | Performed by: INTERNAL MEDICINE

## 2025-01-06 NOTE — PROGRESS NOTES
Elliott Cruz presents today for   Chief Complaint   Patient presents with    4 Month Follow-Up    Hypertension    Diabetes       \"Have you been to the ER, urgent care clinic since your last visit?  Hospitalized since your last visit?\"    NO    “Have you seen or consulted any other health care providers outside of [facility] since your last visit?”    YES - When: approximately 2  weeks ago.  Where and Why: [facility], routine visit.

## 2025-03-14 ENCOUNTER — TELEPHONE (OUTPATIENT)
Facility: CLINIC | Age: 70
End: 2025-03-14

## 2025-03-14 NOTE — TELEPHONE ENCOUNTER
Pt had been contacting the office to set up an appointment for sleep study and the number he was given is to a location on [facility]. Provided updated number for pt to contact which is [phone]. Pt requested for the referral to be faxed to over to the sleep medicine office. Faxed referral to [phone]. Nothing further needed.

## 2025-05-04 NOTE — PROGRESS NOTES
69 y.o. male who presents for evaluation.      He continues to see Dr Jacome and also Dr Shore at the transplant clinic.      He is walking about a mile daily without cp, claudication.  BP running in the 120-130s when he checks.      Denies any GI or  complaints.      Denies polyuria, polydipsia, nocturia, vision change. Weight is stable  He is being managed by Dr Olson and the actos was stopped due to swelling apparently    He has appt for reginaldo evaluation in Sept with [facility]    He is now getting phlebotomy through Dr TREASURE Correa, has gotten 3-4 units out, felt to be due to reginaldo and post-transplant state    LAST MEDICARE WELLNESS EXAM:  5/13/25    Past Medical History:   Diagnosis Date    Acute hearing loss of right ear     Acute urinary retention 08/2015    Anemia of chronic disease 2010    saw Dr. Camp in past    Asbestosis([facility])     CHF (congestive heart failure) ([facility]) 2011    Chronic edema     Colon adenoma     Dr. Ochoa 2009, 2/15, 4/18    DM (diabetes mellitus) ([facility])     w neuropathy and retinopathy Dr Romero; now seing Dr Olson; EMG 2/22 Dr Hernández    Dyslipidemia     ED (erectile dysfunction)     ESRD on hemodialysis ([facility])     Dr. Harvey, M-W-F; s/p cadaveric renal transplant 2019 [facility]    FHx: heart disease     GERD (gastroesophageal reflux disease)     Glaucoma     Dr. Velásquez    H/O cardiac catheterization 03/28/2017    Dr Gimenez; diffuse luminal irregularities without stenosis Cv, LAD, 20-30% mid RCA    H/O cardiovascular stress test     NST neg 2009    Hyperlipidemia     Hypertension     Metabolic dysfunction-associated steatotic liver disease (MASLD) 08/2023    on US (8/23); Fib-4 was 0.94 (10/22)    Nephrolithiasis     REGINALDO (obstructive sleep apnea)     no cpap    Osteoarthritis     Osteoporosis 2024    Dr Olson    Polycystic kidney 10/21/2019    Polycythemia     Dr TREASURE Correa (2/25); from REGINALDO and post-transplant; therapeutic phlebotomy    Right foot drop 2019    S/P kidney transplant 08/2019

## 2025-05-06 ENCOUNTER — HOSPITAL ENCOUNTER (OUTPATIENT)
Facility: HOSPITAL | Age: 70
Setting detail: SPECIMEN
Discharge: HOME | End: 2025-05-09
Payer: MEDICARE

## 2025-05-06 DIAGNOSIS — E11.40 TYPE 2 DIABETES, CONTROLLED, WITH NEUROPATHY (HCC): ICD-10-CM

## 2025-05-06 LAB
ALBUMIN SERPL-MCNC: 3.3 G/DL (ref 3.4–5)
ALBUMIN/GLOB SERPL: 1 (ref 0.8–1.7)
ALP SERPL-CCNC: 100 U/L (ref 45–117)
ALT SERPL-CCNC: 25 U/L (ref 10–50)
ANION GAP SERPL CALC-SCNC: 12 MMOL/L (ref 3–18)
AST SERPL-CCNC: 22 U/L (ref 10–38)
BASOPHILS # BLD: 0.07 K/UL (ref 0–0.1)
BASOPHILS NFR BLD: 0.7 % (ref 0–2)
BILIRUB SERPL-MCNC: 0.5 MG/DL (ref 0.2–1)
BUN SERPL-MCNC: 17 MG/DL (ref 6–23)
BUN/CREAT SERPL: 14 (ref 12–20)
CALCIUM SERPL-MCNC: 10.5 MG/DL (ref 8.5–10.1)
CHLORIDE SERPL-SCNC: 104 MMOL/L (ref 98–107)
CO2 SERPL-SCNC: 24 MMOL/L (ref 21–32)
CREAT SERPL-MCNC: 1.21 MG/DL (ref 0.6–1.3)
DIFFERENTIAL METHOD BLD: ABNORMAL
EOSINOPHIL # BLD: 0.14 K/UL (ref 0–0.4)
EOSINOPHIL NFR BLD: 1.4 % (ref 0–5)
ERYTHROCYTE [DISTWIDTH] IN BLOOD BY AUTOMATED COUNT: 13.4 % (ref 11.6–14.5)
GLOBULIN SER CALC-MCNC: 3.3 G/DL (ref 2–4)
GLUCOSE SERPL-MCNC: 71 MG/DL (ref 74–108)
HBA1C MFR BLD: 8.5 % (ref 4.2–5.6)
HCT VFR BLD AUTO: 47.9 % (ref 36–48)
HGB BLD-MCNC: 14.9 G/DL (ref 13–16)
IMM GRANULOCYTES # BLD AUTO: 0.06 K/UL (ref 0–0.04)
IMM GRANULOCYTES NFR BLD AUTO: 0.6 % (ref 0–0.5)
LYMPHOCYTES # BLD: 0.78 K/UL (ref 0.9–3.6)
LYMPHOCYTES NFR BLD: 8 % (ref 21–52)
MCH RBC QN AUTO: 28.9 PG (ref 24–34)
MCHC RBC AUTO-ENTMCNC: 31.1 G/DL (ref 31–37)
MCV RBC AUTO: 93 FL (ref 78–100)
MONOCYTES # BLD: 1.33 K/UL (ref 0.05–1.2)
MONOCYTES NFR BLD: 13.6 % (ref 3–10)
NEUTS SEG # BLD: 7.41 K/UL (ref 1.8–8)
NEUTS SEG NFR BLD: 75.7 % (ref 40–73)
NRBC # BLD: 0 K/UL (ref 0–0.01)
NRBC BLD-RTO: 0 PER 100 WBC
PLATELET # BLD AUTO: 243 K/UL (ref 135–420)
PMV BLD AUTO: 10.3 FL (ref 9.2–11.8)
POTASSIUM SERPL-SCNC: 4.4 MMOL/L (ref 3.5–5.5)
PROT SERPL-MCNC: 6.6 G/DL (ref 6.4–8.2)
RBC # BLD AUTO: 5.15 M/UL (ref 4.35–5.65)
SODIUM SERPL-SCNC: 140 MMOL/L (ref 136–145)
WBC # BLD AUTO: 9.8 K/UL (ref 4.6–13.2)

## 2025-05-06 PROCEDURE — 85025 COMPLETE CBC W/AUTO DIFF WBC: CPT

## 2025-05-06 PROCEDURE — 83036 HEMOGLOBIN GLYCOSYLATED A1C: CPT

## 2025-05-06 PROCEDURE — 80053 COMPREHEN METABOLIC PANEL: CPT

## 2025-05-13 ENCOUNTER — OFFICE VISIT (OUTPATIENT)
Facility: CLINIC | Age: 70
End: 2025-05-13
Payer: MEDICARE

## 2025-05-13 VITALS
BODY MASS INDEX: 27.36 KG/M2 | TEMPERATURE: 98.2 F | SYSTOLIC BLOOD PRESSURE: 139 MMHG | HEART RATE: 77 BPM | RESPIRATION RATE: 16 BRPM | WEIGHT: 202 LBS | OXYGEN SATURATION: 98 % | HEIGHT: 72 IN | DIASTOLIC BLOOD PRESSURE: 70 MMHG

## 2025-05-13 DIAGNOSIS — D12.6 COLON ADENOMA: ICD-10-CM

## 2025-05-13 DIAGNOSIS — Z94.0 S/P KIDNEY TRANSPLANT: ICD-10-CM

## 2025-05-13 DIAGNOSIS — E78.5 DYSLIPIDEMIA: ICD-10-CM

## 2025-05-13 DIAGNOSIS — Z71.89 ADVANCED CARE PLANNING/COUNSELING DISCUSSION: ICD-10-CM

## 2025-05-13 DIAGNOSIS — E11.21 TYPE 2 DIABETES WITH NEPHROPATHY (HCC): ICD-10-CM

## 2025-05-13 DIAGNOSIS — Z00.00 MEDICARE ANNUAL WELLNESS VISIT, SUBSEQUENT: Primary | ICD-10-CM

## 2025-05-13 DIAGNOSIS — I10 PRIMARY HYPERTENSION: ICD-10-CM

## 2025-05-13 DIAGNOSIS — G47.33 OBSTRUCTIVE SLEEP APNEA SYNDROME: ICD-10-CM

## 2025-05-13 PROBLEM — I50.9 CONGESTIVE HEART FAILURE, UNSPECIFIED HF CHRONICITY, UNSPECIFIED HEART FAILURE TYPE (HCC): Status: RESOLVED | Noted: 2025-05-13 | Resolved: 2025-05-13

## 2025-05-13 PROBLEM — I50.9 CONGESTIVE HEART FAILURE, UNSPECIFIED HF CHRONICITY, UNSPECIFIED HEART FAILURE TYPE (HCC): Status: ACTIVE | Noted: 2025-05-13

## 2025-05-13 PROCEDURE — 1036F TOBACCO NON-USER: CPT | Performed by: INTERNAL MEDICINE

## 2025-05-13 PROCEDURE — 99214 OFFICE O/P EST MOD 30 MIN: CPT | Performed by: INTERNAL MEDICINE

## 2025-05-13 PROCEDURE — 1123F ACP DISCUSS/DSCN MKR DOCD: CPT | Performed by: INTERNAL MEDICINE

## 2025-05-13 PROCEDURE — 99497 ADVNCD CARE PLAN 30 MIN: CPT | Performed by: INTERNAL MEDICINE

## 2025-05-13 PROCEDURE — 3052F HG A1C>EQUAL 8.0%<EQUAL 9.0%: CPT | Performed by: INTERNAL MEDICINE

## 2025-05-13 PROCEDURE — G8427 DOCREV CUR MEDS BY ELIG CLIN: HCPCS | Performed by: INTERNAL MEDICINE

## 2025-05-13 PROCEDURE — 1159F MED LIST DOCD IN RCRD: CPT | Performed by: INTERNAL MEDICINE

## 2025-05-13 PROCEDURE — G0439 PPPS, SUBSEQ VISIT: HCPCS | Performed by: INTERNAL MEDICINE

## 2025-05-13 PROCEDURE — G8417 CALC BMI ABV UP PARAM F/U: HCPCS | Performed by: INTERNAL MEDICINE

## 2025-05-13 PROCEDURE — 3075F SYST BP GE 130 - 139MM HG: CPT | Performed by: INTERNAL MEDICINE

## 2025-05-13 PROCEDURE — 3078F DIAST BP <80 MM HG: CPT | Performed by: INTERNAL MEDICINE

## 2025-05-13 PROCEDURE — 3017F COLORECTAL CA SCREEN DOC REV: CPT | Performed by: INTERNAL MEDICINE

## 2025-05-13 PROCEDURE — 2022F DILAT RTA XM EVC RTNOPTHY: CPT | Performed by: INTERNAL MEDICINE

## 2025-05-13 RX ORDER — FUROSEMIDE 20 MG/1
20 TABLET ORAL PRN
COMMUNITY
Start: 2024-12-18

## 2025-05-13 SDOH — ECONOMIC STABILITY: FOOD INSECURITY: WITHIN THE PAST 12 MONTHS, YOU WORRIED THAT YOUR FOOD WOULD RUN OUT BEFORE YOU GOT MONEY TO BUY MORE.: NEVER TRUE

## 2025-05-13 SDOH — ECONOMIC STABILITY: FOOD INSECURITY: WITHIN THE PAST 12 MONTHS, THE FOOD YOU BOUGHT JUST DIDN'T LAST AND YOU DIDN'T HAVE MONEY TO GET MORE.: NEVER TRUE

## 2025-05-13 ASSESSMENT — PATIENT HEALTH QUESTIONNAIRE - PHQ9
SUM OF ALL RESPONSES TO PHQ QUESTIONS 1-9: 5
1. LITTLE INTEREST OR PLEASURE IN DOING THINGS: NEARLY EVERY DAY
SUM OF ALL RESPONSES TO PHQ QUESTIONS 1-9: 5
4. FEELING TIRED OR HAVING LITTLE ENERGY: SEVERAL DAYS
3. TROUBLE FALLING OR STAYING ASLEEP: SEVERAL DAYS
6. FEELING BAD ABOUT YOURSELF - OR THAT YOU ARE A FAILURE OR HAVE LET YOURSELF OR YOUR FAMILY DOWN: NOT AT ALL
5. POOR APPETITE OR OVEREATING: NOT AT ALL
2. FEELING DOWN, DEPRESSED OR HOPELESS: NOT AT ALL
7. TROUBLE CONCENTRATING ON THINGS, SUCH AS READING THE NEWSPAPER OR WATCHING TELEVISION: NOT AT ALL
9. THOUGHTS THAT YOU WOULD BE BETTER OFF DEAD, OR OF HURTING YOURSELF: NOT AT ALL
SUM OF ALL RESPONSES TO PHQ QUESTIONS 1-9: 5
SUM OF ALL RESPONSES TO PHQ QUESTIONS 1-9: 5
8. MOVING OR SPEAKING SO SLOWLY THAT OTHER PEOPLE COULD HAVE NOTICED. OR THE OPPOSITE, BEING SO FIGETY OR RESTLESS THAT YOU HAVE BEEN MOVING AROUND A LOT MORE THAN USUAL: NOT AT ALL
10. IF YOU CHECKED OFF ANY PROBLEMS, HOW DIFFICULT HAVE THESE PROBLEMS MADE IT FOR YOU TO DO YOUR WORK, TAKE CARE OF THINGS AT HOME, OR GET ALONG WITH OTHER PEOPLE: NOT DIFFICULT AT ALL

## 2025-05-13 ASSESSMENT — LIFESTYLE VARIABLES
HOW OFTEN DO YOU HAVE A DRINK CONTAINING ALCOHOL: NEVER
HOW MANY STANDARD DRINKS CONTAINING ALCOHOL DO YOU HAVE ON A TYPICAL DAY: PATIENT DOES NOT DRINK

## 2025-05-13 NOTE — PATIENT INSTRUCTIONS
understand what a person is saying. Face the person you are talking to, and have them face you. Make sure the lighting is good. You need to see the other person's face clearly.  Think about counseling if you need help to adjust to your hearing loss.  When should you call for help?  Watch closely for changes in your health, and be sure to contact your doctor if:    You think your hearing is getting worse.     You have new symptoms, such as dizziness or nausea.   Where can you learn more?  Go to https://www.[employer]net/patientEd and enter R798 to learn more about \"Hearing Loss: Care Instructions.\"  Current as of: October 27, 2024  Content Version: 14.4  © 2024-2025 [employer].   Care instructions adapted under license by [employer]. If you have questions about a medical condition or this instruction, always ask your healthcare professional. [employer], disclaims any warranty or liability for your use of this information.         Learning About Vision Tests  What are vision tests?     The four most common vision tests are visual acuity tests, refraction, visual field tests, and color vision tests.  Visual acuity (sharpness) tests  These tests are used:  To see if you need glasses or contact lenses.  To monitor an eye problem.  To check an eye injury.  Visual acuity tests are done as part of routine exams. You may also have this test when you get your 's license or apply for some types of jobs.  Visual field tests  These tests are used:  To check for vision loss in any area of your range of vision.  To screen for certain eye diseases.  To look for nerve damage after a stroke, head injury, or other problem that could reduce blood flow to the brain.  Refraction and color tests  A refraction test is done to find the right prescription for glasses and contact lenses.  A color vision test is done to check for color blindness.  Color vision is often tested as part of a routine exam. You may

## 2025-05-13 NOTE — ACP (ADVANCE CARE PLANNING)
Advance Care Planning     Advance Care Planning (ACP) Physician/NP/PA Conversation    Date of Conversation: 5/13/2025  Conducted with: Patient with Decision Making Capacity    Healthcare Decision Maker:      Primary Decision Maker: Glenys Cruz - [phone]    Click here to complete Healthcare Decision Makers including selection of the Healthcare Decision Maker Relationship (ie \"Primary\")  Today we documented Decision Maker(s) consistent with Legal Next of Kin hierarchy.    Care Preferences:    Hospitalization:  \"If your health worsens and it becomes clear that your chance of recovery is unlikely, what would be your preference regarding hospitalization?\"  The patient would prefer hospitalization.    Ventilation:  \"If you were unable to breath on your own and your chance of recovery was unlikely, what would be your preference about the use of a ventilator (breathing machine) if it was available to you?\"  The patient would desire the use of a ventilator.    Resuscitation:  \"In the event your heart stopped as a result of an underlying serious health condition, would you want attempts made to restart your heart, or would you prefer a natural death?\"  Yes, attempt to resuscitate.    ventilation preferences, hospitalization preferences, and resuscitation preferences    Conversation Outcomes / Follow-Up Plan:  ACP in process - information provided, considering goals and options  Reviewed DNR/DNI and patient elects Full Code (Attempt Resuscitation)    Length of Voluntary ACP Conversation in minutes:  16 minutes    DENISHA STONE MD

## 2025-05-13 NOTE — PROGRESS NOTES
Medicare Annual Wellness Visit    Elliott Cruz is here for Medicare AWV    Assessment & Plan   Advanced care planning/counseling discussion  Congestive heart failure, unspecified HF chronicity, unspecified heart failure type (HCC)  Primary hypertension  Obstructive sleep apnea syndrome  Colon adenoma  Type 2 diabetes with nephropathy (HCC)  -     HEMOGLOBIN A1C W/O EAG; Future  -     Basic Metabolic Panel; Future  Dyslipidemia  -     Lipid Panel; Future  S/P kidney transplant  Medicare annual wellness visit, subsequent       Return in 6 months (on 11/13/2025).     Subjective       Patient's complete Health Risk Assessment and screening values have been reviewed and are found in Flowsheets. The following problems were reviewed today and where indicated follow up appointments were made and/or referrals ordered.    Positive Risk Factor Screenings with Interventions:    Fall Risk:  Do you feel unsteady or are you worried about falling? : (!) yes  2 or more falls in past year?: no  Fall with injury in past year?: no     Interventions:    Reviewed medications, home hazards, visual acuity, and co-morbidities that can increase risk for falls  Patient declines any further evaluation or treatment    Cognitive:   Clock Drawing Test (CDT): (!) Abnormal (unable due to eyesight)  Words recalled: 3 Words Recalled  Total Score: 3  Total Score Interpretation: Normal Mini-Cog  Interventions:  Patient declines any further evaluation or treatment    Depression:  PHQ-2 Score: 3  PHQ-9 Total Score: 5  Total Score Interpretation: 5-9 = mild depression  Interventions:  Patient declines any further evaluation or treatment         Self-assessment of health:  In general, how would you say your health is?: (!) Poor    Interventions:  Patient declines any further evaluation or treatment    General HRA Questions:  Select all that apply: (!) New or Increased Fatigue  Interventions Fatigue:  Patient declined any further interventions or

## 2025-05-13 NOTE — PROGRESS NOTES
Elliott Cruz presents today for   Chief Complaint   Patient presents with    Medicare AWV       \"Have you been to the ER, urgent care clinic since your last visit?  Hospitalized since your last visit?\"    NO    “Have you seen or consulted any other health care providers outside of [facility] since your last visit?”    YES - When: approximately 2 months ago.  Where and Why: [facility], diabetes.

## 2025-07-02 ENCOUNTER — HOSPITAL ENCOUNTER (EMERGENCY)
Facility: HOSPITAL | Age: 70
Discharge: HOME | End: 2025-07-02
Payer: MEDICARE

## 2025-07-02 ENCOUNTER — APPOINTMENT (OUTPATIENT)
Facility: HOSPITAL | Age: 70
End: 2025-07-02
Payer: MEDICARE

## 2025-07-02 VITALS
BODY MASS INDEX: 28.14 KG/M2 | SYSTOLIC BLOOD PRESSURE: 169 MMHG | HEIGHT: 71 IN | HEART RATE: 70 BPM | TEMPERATURE: 98.2 F | DIASTOLIC BLOOD PRESSURE: 77 MMHG | WEIGHT: 201 LBS | RESPIRATION RATE: 20 BRPM | OXYGEN SATURATION: 97 %

## 2025-07-02 DIAGNOSIS — R06.02 SHORTNESS OF BREATH ON EXERTION: ICD-10-CM

## 2025-07-02 DIAGNOSIS — S20.211A RIB CONTUSION, RIGHT, INITIAL ENCOUNTER: Primary | ICD-10-CM

## 2025-07-02 LAB
ALBUMIN SERPL-MCNC: 3.6 G/DL (ref 3.4–5)
ALBUMIN/GLOB SERPL: 1.1 (ref 0.8–1.7)
ALP SERPL-CCNC: 128 U/L (ref 45–117)
ALT SERPL-CCNC: 24 U/L (ref 10–50)
ANION GAP SERPL CALC-SCNC: 12 MMOL/L (ref 3–18)
AST SERPL-CCNC: 25 U/L (ref 10–38)
BASOPHILS # BLD: 0.06 K/UL (ref 0–0.1)
BASOPHILS NFR BLD: 0.6 % (ref 0–2)
BILIRUB SERPL-MCNC: 0.4 MG/DL (ref 0.2–1)
BUN SERPL-MCNC: 20 MG/DL (ref 6–23)
BUN/CREAT SERPL: 16 (ref 12–20)
CALCIUM SERPL-MCNC: 11.1 MG/DL (ref 8.5–10.1)
CHLORIDE SERPL-SCNC: 105 MMOL/L (ref 98–107)
CO2 SERPL-SCNC: 23 MMOL/L (ref 21–32)
CREAT SERPL-MCNC: 1.2 MG/DL (ref 0.6–1.3)
DIFFERENTIAL METHOD BLD: ABNORMAL
EKG ATRIAL RATE: 67 BPM
EKG DIAGNOSIS: NORMAL
EKG P AXIS: -1 DEGREES
EKG P-R INTERVAL: 198 MS
EKG Q-T INTERVAL: 404 MS
EKG QRS DURATION: 110 MS
EKG QTC CALCULATION (BAZETT): 426 MS
EKG R AXIS: -30 DEGREES
EKG T AXIS: 17 DEGREES
EKG VENTRICULAR RATE: 67 BPM
EOSINOPHIL # BLD: 0.16 K/UL (ref 0–0.4)
EOSINOPHIL NFR BLD: 1.5 % (ref 0–5)
ERYTHROCYTE [DISTWIDTH] IN BLOOD BY AUTOMATED COUNT: 14.3 % (ref 11.6–14.5)
GLOBULIN SER CALC-MCNC: 3.3 G/DL (ref 2–4)
GLUCOSE SERPL-MCNC: 94 MG/DL (ref 74–108)
HCT VFR BLD AUTO: 47.4 % (ref 36–48)
HGB BLD-MCNC: 14.8 G/DL (ref 13–16)
IMM GRANULOCYTES # BLD AUTO: 0.07 K/UL (ref 0–0.04)
IMM GRANULOCYTES NFR BLD AUTO: 0.7 % (ref 0–0.5)
LYMPHOCYTES # BLD: 0.62 K/UL (ref 0.9–3.6)
LYMPHOCYTES NFR BLD: 5.9 % (ref 21–52)
MCH RBC QN AUTO: 28.1 PG (ref 24–34)
MCHC RBC AUTO-ENTMCNC: 31.2 G/DL (ref 31–37)
MCV RBC AUTO: 89.9 FL (ref 78–100)
MONOCYTES # BLD: 0.98 K/UL (ref 0.05–1.2)
MONOCYTES NFR BLD: 9.3 % (ref 3–10)
NEUTS SEG # BLD: 8.66 K/UL (ref 1.8–8)
NEUTS SEG NFR BLD: 82 % (ref 40–73)
NRBC # BLD: 0 K/UL (ref 0–0.01)
NRBC BLD-RTO: 0 PER 100 WBC
NT PRO BNP: 292 PG/ML (ref 36–900)
PLATELET # BLD AUTO: 278 K/UL (ref 135–420)
PMV BLD AUTO: 9.7 FL (ref 9.2–11.8)
POTASSIUM SERPL-SCNC: 4.3 MMOL/L (ref 3.5–5.5)
PROT SERPL-MCNC: 6.9 G/DL (ref 6.4–8.2)
RBC # BLD AUTO: 5.27 M/UL (ref 4.35–5.65)
SODIUM SERPL-SCNC: 140 MMOL/L (ref 136–145)
TROPONIN T SERPL HS-MCNC: 75.9 NG/L (ref 0–22)
TROPONIN T SERPL HS-MCNC: 89 NG/L (ref 0–22)
WBC # BLD AUTO: 10.6 K/UL (ref 4.6–13.2)

## 2025-07-02 PROCEDURE — 93005 ELECTROCARDIOGRAM TRACING: CPT | Performed by: EMERGENCY MEDICINE

## 2025-07-02 PROCEDURE — 85025 COMPLETE CBC W/AUTO DIFF WBC: CPT

## 2025-07-02 PROCEDURE — 71046 X-RAY EXAM CHEST 2 VIEWS: CPT

## 2025-07-02 PROCEDURE — 99285 EMERGENCY DEPT VISIT HI MDM: CPT

## 2025-07-02 PROCEDURE — 84484 ASSAY OF TROPONIN QUANT: CPT

## 2025-07-02 PROCEDURE — 93010 ELECTROCARDIOGRAM REPORT: CPT | Performed by: INTERNAL MEDICINE

## 2025-07-02 PROCEDURE — 80053 COMPREHEN METABOLIC PANEL: CPT

## 2025-07-02 PROCEDURE — 83880 ASSAY OF NATRIURETIC PEPTIDE: CPT

## 2025-07-02 ASSESSMENT — PAIN DESCRIPTION - LOCATION
LOCATION: BACK
LOCATION: CHEST;BACK

## 2025-07-02 ASSESSMENT — ENCOUNTER SYMPTOMS
ABDOMINAL DISTENTION: 0
SHORTNESS OF BREATH: 0
ABDOMINAL PAIN: 0
BLOOD IN STOOL: 0
CONSTIPATION: 0
CHEST TIGHTNESS: 0

## 2025-07-02 ASSESSMENT — PAIN DESCRIPTION - ORIENTATION: ORIENTATION: MID

## 2025-07-02 ASSESSMENT — PAIN SCALES - GENERAL
PAINLEVEL_OUTOF10: 4
PAINLEVEL_OUTOF10: 4

## 2025-07-02 ASSESSMENT — PAIN DESCRIPTION - DESCRIPTORS
DESCRIPTORS: ACHING
DESCRIPTORS: ACHING;SORE

## 2025-07-02 ASSESSMENT — PAIN DESCRIPTION - PAIN TYPE: TYPE: ACUTE PAIN

## 2025-07-02 ASSESSMENT — PAIN - FUNCTIONAL ASSESSMENT: PAIN_FUNCTIONAL_ASSESSMENT: 0-10

## 2025-07-02 NOTE — ED PROVIDER NOTES
EMERGENCY DEPARTMENT HISTORY AND PHYSICAL EXAM      Patient Name: Elliott Cruz  MRN: [id number]  YOB: 1955  Provider: Estefanía Babcock DNP, ENP-C, FNP-C  PCP: Hira Mejia MD   Time/Date of evaluation: 11:48 AM EDT on 7/2/25    History of Presenting Illness     Chief Complaint   Patient presents with    Shortness of Breath    Back Pain    Chest Pain       History Provided By: Patient     History (Narative):   Elliott Cruz is a 69 y.o. male  has a past medical history of Acute hearing loss of right ear, Acute urinary retention, Anemia of chronic disease, Asbestosis(501), CHF (congestive heart failure) (HCC), Chronic edema, Colon adenoma, DM (diabetes mellitus) (HCC), Dyslipidemia, ED (erectile dysfunction), ESRD on hemodialysis (HCC), FHx: heart disease, GERD (gastroesophageal reflux disease), Glaucoma, H/O cardiac catheterization, H/O cardiovascular stress test, Hyperlipidemia, Hypertension, Metabolic dysfunction-associated steatotic liver disease (MASLD), Nephrolithiasis, CHON (obstructive sleep apnea), Osteoarthritis, Osteoporosis, Polycystic kidney, Polycythemia, Right foot drop, S/P kidney transplant, Sarcoidosis, and Vitamin D deficiency. who presents to the emergency department  by POV C/O patient at bedside.  Patient has obscure complaints today.  Patient and wife in room and trying to help with history.  10 days ago patient had a slip in the kitchen where he hit his lower rib cage in the front and he that he went down to the ground.  Patient denied any dizziness or any other symptoms with the fall.  Patient states that it was raining and the floor was wet and that is why he slipped.  Patient did not hit his head.  Patient is not on blood thinners.  Patient only states that his sides under his rib cage on the right side has hurt since he is falling.  Mainly with deep breath or bending forward.  No chest pain has been reported patient is not short of breath at this time.  Patient has a

## 2025-07-02 NOTE — ED TRIAGE NOTES
Pt came ambulatory to triage c/o chest pain, SOB and back pain x 10 days. Pt states that he fell forward on the countertop a week ago and has been feeling chest pain and SOB since. Pt able to speak in full sentences at this time, 97% in room air.

## 2025-07-03 RX ORDER — OMEPRAZOLE 40 MG/1
40 CAPSULE, DELAYED RELEASE ORAL DAILY
Qty: 90 CAPSULE | Refills: 2 | Status: SHIPPED | OUTPATIENT
Start: 2025-07-03

## 2025-07-07 ENCOUNTER — OFFICE VISIT (OUTPATIENT)
Facility: CLINIC | Age: 70
End: 2025-07-07

## 2025-07-07 VITALS
TEMPERATURE: 97.4 F | WEIGHT: 198.2 LBS | SYSTOLIC BLOOD PRESSURE: 138 MMHG | OXYGEN SATURATION: 98 % | RESPIRATION RATE: 22 BRPM | HEART RATE: 72 BPM | DIASTOLIC BLOOD PRESSURE: 86 MMHG | BODY MASS INDEX: 27.75 KG/M2 | HEIGHT: 71 IN

## 2025-07-07 DIAGNOSIS — R07.81 RIB PAIN: Primary | ICD-10-CM

## 2025-07-07 DIAGNOSIS — R06.09 DYSPNEA ON EXERTION: ICD-10-CM

## 2025-07-07 RX ORDER — LIDOCAINE 4 G/G
1 PATCH TOPICAL DAILY
Qty: 30 PATCH | Refills: 0 | Status: SHIPPED | OUTPATIENT
Start: 2025-07-07 | End: 2025-08-06

## 2025-07-07 RX ORDER — CALCIUM CARBONATE 300MG(750)
800 TABLET,CHEWABLE ORAL 2 TIMES DAILY
COMMUNITY

## 2025-07-07 RX ORDER — REPAGLINIDE 1 MG/1
1 TABLET ORAL
COMMUNITY
Start: 2025-06-02

## 2025-07-07 RX ORDER — INSULIN GLARGINE 300 U/ML
46 INJECTION, SOLUTION SUBCUTANEOUS NIGHTLY
COMMUNITY
Start: 2025-05-30

## 2025-07-07 RX ORDER — PEN NEEDLE, DIABETIC 32GX 5/32"
1 NEEDLE, DISPOSABLE MISCELLANEOUS DAILY
COMMUNITY
Start: 2025-06-18

## 2025-07-07 NOTE — PROGRESS NOTES
Elliott Cruz (: 1955) is a 69 y.o. male, here for evaluation of the following chief complaint(s):  Follow-Up from Hospital       ASSESSMENT/PLAN:  Below is the assessment and plan developed based on review of pertinent history, physical exam, labs, studies, and medications.    1. Rib pain  Assessment & Plan:  Mild pain on deep inspiration.  Patient takes Tylenol for pain.  May use lidocaine patch.    Orders:  -     lidocaine 4 % external patch; Place 1 patch onto the skin daily, TransDERmal, DAILY Starting Mon 2025, Until Wed 2025, For 30 days, Disp-30 patch, R-0, Normal  2. Dyspnea on exertion  Assessment & Plan:   Patient has history of CHF.  Patient clinically stable.  BNP on  was 292.    Patient has appointment to see cardiology on         Return if symptoms worsen or fail to improve.     SUBJECTIVE/OBJECTIVE:  HPI  Patient had a fall when he slipped forward and hit a shelf about 2 weeks ago.  Patient went to the ER on  with chest pain and shortness of breath.    Chest x-ray showed mild basilar atelectasis.    Patient had been using Tylenol for pain.    Patient has a history of renal transplant.  Follows with nephrology.    Today patient is accompanied by his wife.  Patient complains of minimal pain in the anterior rib cage.  No shortness of breath at rest.  Patient has some dyspnea with exertion.  No GI/ symptoms.    Reviewed hospital records, doctors notes, labs and imaging studies.  Labs from  has shown,  .  Hemoglobin 14.8, hematocrit 47.4.  BUN 20, creatinine 1.2, EGFR 65.  Liver function within acceptable range.  Troponin 75.9      Chest x-ray shows mild basilar atelectasis.    ROS as above    Vitals:    25 1138 25 1151 25 1203   BP: (!) 151/78 (!) 149/78 138/86   Pulse: 72     Resp: 22     Temp: 97.4 °F (36.3 °C)     TempSrc: Temporal     SpO2: 98%     Weight: 89.9 kg (198 lb 3.2 oz)     Height: 1.803 m (5' 11\")       Physical Exam  Constitutional:

## 2025-07-07 NOTE — PROGRESS NOTES
Patent continues to complain of shortness of breath non related to the fall he had. Patient has an appointment with Dr Garcia in the morning at 8 AM. SOB worse with activity. No swelling, no chest pain.    Chief Complaint   Patient presents with    Follow-Up from Hospital       \"Have you been to the ER, urgent care clinic since your last visit?  Hospitalized since your last visit?\"    YES - When: approximately 5 days ago.  Where and Why: fall/rib pain and SOB.    “Have you seen or consulted any other health care providers outside our system since your last visit?”    NO      “Have you had a diabetic eye exam?”    [facility]     Date of last diabetic eye exam: 7/17/2023

## 2025-07-08 ENCOUNTER — TELEPHONE (OUTPATIENT)
Dept: CARDIOTHORACIC SURGERY | Age: 70
End: 2025-07-08

## 2025-07-08 ENCOUNTER — OFFICE VISIT (OUTPATIENT)
Age: 70
End: 2025-07-08
Payer: MEDICARE

## 2025-07-08 VITALS
WEIGHT: 199 LBS | HEART RATE: 75 BPM | OXYGEN SATURATION: 96 % | BODY MASS INDEX: 27.86 KG/M2 | HEIGHT: 71 IN | SYSTOLIC BLOOD PRESSURE: 124 MMHG | DIASTOLIC BLOOD PRESSURE: 60 MMHG

## 2025-07-08 DIAGNOSIS — Z79.4 TYPE 2 DIABETES MELLITUS WITH OTHER SPECIFIED COMPLICATION, WITH LONG-TERM CURRENT USE OF INSULIN (HCC): ICD-10-CM

## 2025-07-08 DIAGNOSIS — E78.2 MIXED HYPERLIPIDEMIA: ICD-10-CM

## 2025-07-08 DIAGNOSIS — Z94.0 RENAL TRANSPLANT, STATUS POST: ICD-10-CM

## 2025-07-08 DIAGNOSIS — R01.1 MURMUR, CARDIAC: ICD-10-CM

## 2025-07-08 DIAGNOSIS — I10 ESSENTIAL HYPERTENSION: ICD-10-CM

## 2025-07-08 DIAGNOSIS — R07.9 CHEST PAIN, UNSPECIFIED TYPE: Primary | ICD-10-CM

## 2025-07-08 DIAGNOSIS — R79.89 ELEVATED TROPONIN: ICD-10-CM

## 2025-07-08 DIAGNOSIS — E11.69 TYPE 2 DIABETES MELLITUS WITH OTHER SPECIFIED COMPLICATION, WITH LONG-TERM CURRENT USE OF INSULIN (HCC): ICD-10-CM

## 2025-07-08 PROBLEM — E11.9 DIABETES MELLITUS (HCC): Status: ACTIVE | Noted: 2019-02-17

## 2025-07-08 PROCEDURE — 1160F RVW MEDS BY RX/DR IN RCRD: CPT | Performed by: INTERNAL MEDICINE

## 2025-07-08 PROCEDURE — G8417 CALC BMI ABV UP PARAM F/U: HCPCS | Performed by: INTERNAL MEDICINE

## 2025-07-08 PROCEDURE — 1123F ACP DISCUSS/DSCN MKR DOCD: CPT | Performed by: INTERNAL MEDICINE

## 2025-07-08 PROCEDURE — 99204 OFFICE O/P NEW MOD 45 MIN: CPT | Performed by: INTERNAL MEDICINE

## 2025-07-08 PROCEDURE — 1125F AMNT PAIN NOTED PAIN PRSNT: CPT | Performed by: INTERNAL MEDICINE

## 2025-07-08 PROCEDURE — G8427 DOCREV CUR MEDS BY ELIG CLIN: HCPCS | Performed by: INTERNAL MEDICINE

## 2025-07-08 PROCEDURE — 3078F DIAST BP <80 MM HG: CPT | Performed by: INTERNAL MEDICINE

## 2025-07-08 PROCEDURE — 3052F HG A1C>EQUAL 8.0%<EQUAL 9.0%: CPT | Performed by: INTERNAL MEDICINE

## 2025-07-08 PROCEDURE — 1159F MED LIST DOCD IN RCRD: CPT | Performed by: INTERNAL MEDICINE

## 2025-07-08 PROCEDURE — 1036F TOBACCO NON-USER: CPT | Performed by: INTERNAL MEDICINE

## 2025-07-08 PROCEDURE — 3074F SYST BP LT 130 MM HG: CPT | Performed by: INTERNAL MEDICINE

## 2025-07-08 PROCEDURE — 3017F COLORECTAL CA SCREEN DOC REV: CPT | Performed by: INTERNAL MEDICINE

## 2025-07-08 PROCEDURE — 2022F DILAT RTA XM EVC RTNOPTHY: CPT | Performed by: INTERNAL MEDICINE

## 2025-07-08 ASSESSMENT — ENCOUNTER SYMPTOMS
SHORTNESS OF BREATH: 1
GASTROINTESTINAL NEGATIVE: 1
EYES NEGATIVE: 1

## 2025-07-08 NOTE — PROGRESS NOTES
Elliott Cruz is a 69 y.o. year old male.    7/8/2025 seen as a new patient for chest pain and shortness of breath.  Recent ER visit on 7-25 when chest pain thought to be musculoskeletal due to fall in the ribs.  Also noted to have hypercalcemia.  Has ESRD status post kidney transplant 2019.  Has not been walking much and functional capacity is difficult to  but chest pain as well as back pain which happened when he fell are improving gradually.  Shortness of breath present when he had fallen and had significant pain in chest and back.  No definite dyspnea complaint gets rare edema for which she takes Lasix once in a few months.  No dizziness or palpitations.          Review of Systems   Constitutional: Negative.    HENT: Negative.     Eyes: Negative.    Respiratory:  Positive for shortness of breath.    Cardiovascular:  Positive for chest pain.   Gastrointestinal: Negative.    Endocrine: Negative.    Genitourinary: Negative.    Musculoskeletal: Negative.    Neurological: Negative.    Psychiatric/Behavioral: Negative.     All other systems reviewed and are negative.        Physical Exam  Vitals and nursing note reviewed.   Constitutional:       Appearance: Normal appearance.   HENT:      Head: Normocephalic and atraumatic.      Nose: Nose normal.   Eyes:      Conjunctiva/sclera: Conjunctivae normal.   Cardiovascular:      Rate and Rhythm: Normal rate and regular rhythm.      Pulses: Normal pulses.      Heart sounds: Murmur heard.      Harsh early systolic murmur is present with a grade of 2/6 at the upper right sternal border.   Pulmonary:      Effort: Pulmonary effort is normal.      Breath sounds: Normal breath sounds.   Abdominal:      General: Bowel sounds are normal.      Palpations: Abdomen is soft.   Musculoskeletal:         General: Normal range of motion.   Skin:     General: Skin is warm and dry.   Neurological:      General: No focal deficit present.      Mental Status: He is alert and oriented to

## (undated) DEVICE — SYRINGE MED 20ML STD CLR PLAS LUERLOCK TIP N CTRL DISP

## (undated) DEVICE — (D)SYR 10ML 1/5ML GRAD NSAF -- PKGING CHANGE USE ITEM 338027

## (undated) DEVICE — MEDI-VAC SUCTION HIGH CAPACITY: Brand: CARDINAL HEALTH

## (undated) DEVICE — GUIDEWIRE WITH ICE™ HYDROPHILIC COATING: Brand: V-18™ CONTROL WIRE™

## (undated) DEVICE — DEVICE INFL ENCORE MEDI 26

## (undated) DEVICE — SNARE ENDO 2.4MMX230CM -- COLD EXACTO

## (undated) DEVICE — CATHETER SUCT TR FL TIP 14FR W/ O CTRL

## (undated) DEVICE — SYRINGE MED 50ML LUERSLIP TIP

## (undated) DEVICE — INTRODUCER SHTH 4FR CANN L11CM DIL TIP 25MM RED TUNGSTEN

## (undated) DEVICE — CANNULA NSL AD TBNG L14FT STD PVC O2 CRV CONN NONFLARED NSL

## (undated) DEVICE — YANKAUER,SMOOTH HANDLE,HIGH CAPACITY: Brand: MEDLINE INDUSTRIES, INC.

## (undated) DEVICE — SUT ETHLN 3-0 18IN PC5 BLK --

## (undated) DEVICE — SNARE POLYP M W27MMXL240CM OVL STIFF DISP CAPTIVATOR

## (undated) DEVICE — SYR 50ML SLIP TIP NSAF LF STRL --

## (undated) DEVICE — LINER SUCT CANSTR 3000CC PLAS SFT PRE ASSEMB W/OUT TBNG W/

## (undated) DEVICE — SYRINGE MED 10ML LUERLOCK TIP W/O SFTY DISP

## (undated) DEVICE — TRAP SPEC COLL POLYP POLYSTYR --

## (undated) DEVICE — ENDOSCOPY PUMP TUBING/ CAP SET: Brand: ERBE

## (undated) DEVICE — PTA BALLOON DILATATION CATHETER: Brand: STERLING™

## (undated) DEVICE — FLEX ADVANTAGE 3000CC: Brand: FLEX ADVANTAGE

## (undated) DEVICE — SYRINGE 20ML LL S/C 50

## (undated) DEVICE — UNDERPAD INCONT W23XL36IN STD BLU POLYPR BK FLUF SFT

## (undated) DEVICE — MEDI-VAC NON-CONDUCTIVE SUCTION TUBING: Brand: CARDINAL HEALTH

## (undated) DEVICE — GOWN ISOL IMPERV UNIV, DISP, OPEN BACK, BLUE --

## (undated) DEVICE — GOWN PLASTIC FILM THMBHKS UNIV BLUE: Brand: CARDINAL HEALTH

## (undated) DEVICE — SYRINGE MED 25GA 3ML L5/8IN SUBQ PLAS W/ DETACH NDL SFTY

## (undated) DEVICE — SOLUTION IRRIG 1000ML H2O STRL BLT

## (undated) DEVICE — GAUZE,SPONGE,4"X4",16PLY,STRL,LF,10/TRAY: Brand: MEDLINE

## (undated) DEVICE — CANNULA ORIG TL CLR W FOAM CUSHIONS AND 14FT SUPL TB 3 CHN

## (undated) DEVICE — AIRLIFE™ NASAL OXYGEN CANNULA CURVED, NONFLARED TIP WITH 14 FOOT (4.3 M) CRUSH-RESISTANT TUBING, OVER-THE-EAR STYLE: Brand: AIRLIFE™

## (undated) DEVICE — GAUZE SPONGES,16 PLY: Brand: CURITY

## (undated) DEVICE — PACK PROCEDURE SURG VASC CATH 161 MMC LF

## (undated) DEVICE — FORCEPS BX L240CM JAW DIA2.8MM L CAP W/ NDL MIC MESH TOOTH

## (undated) DEVICE — FLUFF AND POLYMER UNDERPAD,EXTRA HEAVY: Brand: WINGS

## (undated) DEVICE — (D)GLOVE EXAM LG NITRL NS -- DISC BY MFR NO SUB

## (undated) DEVICE — SET FLD ADMIN 3 W STPCOCK FIX FEM L BOR 1IN

## (undated) DEVICE — COVER US PRB W15XL120CM W/ GEL RUBBERBAND TAPE STRP FLD GEN